# Patient Record
Sex: MALE | Race: WHITE | NOT HISPANIC OR LATINO | Employment: OTHER | URBAN - METROPOLITAN AREA
[De-identification: names, ages, dates, MRNs, and addresses within clinical notes are randomized per-mention and may not be internally consistent; named-entity substitution may affect disease eponyms.]

---

## 2017-02-22 ENCOUNTER — RECORDS - HEALTHEAST (OUTPATIENT)
Dept: LAB | Facility: CLINIC | Age: 67
End: 2017-02-22

## 2017-02-22 LAB
CHOLEST SERPL-MCNC: 135 MG/DL
FASTING STATUS PATIENT QL REPORTED: YES
HDLC SERPL-MCNC: 42 MG/DL
LDLC SERPL CALC-MCNC: 74 MG/DL
TRIGL SERPL-MCNC: 96 MG/DL

## 2017-02-23 LAB — HBA1C MFR BLD: 6.6 % (ref 4.2–6.1)

## 2017-08-28 ENCOUNTER — RECORDS - HEALTHEAST (OUTPATIENT)
Dept: LAB | Facility: CLINIC | Age: 67
End: 2017-08-28

## 2017-08-28 LAB
CHOLEST SERPL-MCNC: 139 MG/DL
FASTING STATUS PATIENT QL REPORTED: NORMAL
HDLC SERPL-MCNC: 44 MG/DL
LDLC SERPL CALC-MCNC: 81 MG/DL
TRIGL SERPL-MCNC: 71 MG/DL

## 2018-04-20 ENCOUNTER — RECORDS - HEALTHEAST (OUTPATIENT)
Dept: LAB | Facility: CLINIC | Age: 68
End: 2018-04-20

## 2018-04-20 ENCOUNTER — RECORDS - HEALTHEAST (OUTPATIENT)
Dept: ADMINISTRATIVE | Facility: OTHER | Age: 68
End: 2018-04-20

## 2018-04-20 LAB
ANION GAP SERPL CALCULATED.3IONS-SCNC: 10 MMOL/L (ref 5–18)
BUN SERPL-MCNC: 17 MG/DL (ref 8–22)
CALCIUM SERPL-MCNC: 8.7 MG/DL (ref 8.5–10.5)
CHLORIDE BLD-SCNC: 103 MMOL/L (ref 98–107)
CHOLEST SERPL-MCNC: 112 MG/DL
CO2 SERPL-SCNC: 26 MMOL/L (ref 22–31)
CREAT SERPL-MCNC: 1.21 MG/DL (ref 0.7–1.3)
FASTING STATUS PATIENT QL REPORTED: NORMAL
GFR SERPL CREATININE-BSD FRML MDRD: 60 ML/MIN/1.73M2
GLUCOSE BLD-MCNC: 133 MG/DL (ref 70–125)
HDLC SERPL-MCNC: 41 MG/DL
LDLC SERPL CALC-MCNC: 59 MG/DL
POTASSIUM BLD-SCNC: 4.4 MMOL/L (ref 3.5–5)
SODIUM SERPL-SCNC: 139 MMOL/L (ref 136–145)
TRIGL SERPL-MCNC: 60 MG/DL

## 2018-07-24 ENCOUNTER — RECORDS - HEALTHEAST (OUTPATIENT)
Dept: ADMINISTRATIVE | Facility: OTHER | Age: 68
End: 2018-07-24

## 2018-07-24 ENCOUNTER — RECORDS - HEALTHEAST (OUTPATIENT)
Dept: LAB | Facility: CLINIC | Age: 68
End: 2018-07-24

## 2018-07-24 LAB
ANION GAP SERPL CALCULATED.3IONS-SCNC: 7 MMOL/L (ref 5–18)
BUN SERPL-MCNC: 16 MG/DL (ref 8–22)
CALCIUM SERPL-MCNC: 9.1 MG/DL (ref 8.5–10.5)
CHLORIDE BLD-SCNC: 106 MMOL/L (ref 98–107)
CO2 SERPL-SCNC: 29 MMOL/L (ref 22–31)
CREAT SERPL-MCNC: 1.05 MG/DL (ref 0.7–1.3)
GFR SERPL CREATININE-BSD FRML MDRD: >60 ML/MIN/1.73M2
GLUCOSE BLD-MCNC: 77 MG/DL (ref 70–125)
POTASSIUM BLD-SCNC: 4.4 MMOL/L (ref 3.5–5)
SODIUM SERPL-SCNC: 142 MMOL/L (ref 136–145)

## 2018-08-13 ENCOUNTER — RECORDS - HEALTHEAST (OUTPATIENT)
Dept: ADMINISTRATIVE | Facility: OTHER | Age: 68
End: 2018-08-13

## 2018-08-15 ENCOUNTER — COMMUNICATION - HEALTHEAST (OUTPATIENT)
Dept: ADMINISTRATIVE | Facility: CLINIC | Age: 68
End: 2018-08-15

## 2018-10-30 ENCOUNTER — RECORDS - HEALTHEAST (OUTPATIENT)
Dept: LAB | Facility: CLINIC | Age: 68
End: 2018-10-30

## 2018-10-30 LAB
ANION GAP SERPL CALCULATED.3IONS-SCNC: 11 MMOL/L (ref 5–18)
BUN SERPL-MCNC: 17 MG/DL (ref 8–22)
CALCIUM SERPL-MCNC: 8.8 MG/DL (ref 8.5–10.5)
CHLORIDE BLD-SCNC: 106 MMOL/L (ref 98–107)
CHOLEST SERPL-MCNC: 116 MG/DL
CO2 SERPL-SCNC: 25 MMOL/L (ref 22–31)
CREAT SERPL-MCNC: 1.03 MG/DL (ref 0.7–1.3)
FASTING STATUS PATIENT QL REPORTED: YES
GFR SERPL CREATININE-BSD FRML MDRD: >60 ML/MIN/1.73M2
GLUCOSE BLD-MCNC: 109 MG/DL (ref 70–125)
HDLC SERPL-MCNC: 45 MG/DL
LDLC SERPL CALC-MCNC: 62 MG/DL
POTASSIUM BLD-SCNC: 4.4 MMOL/L (ref 3.5–5)
PSA SERPL-MCNC: 0.9 NG/ML (ref 0–4.5)
SODIUM SERPL-SCNC: 142 MMOL/L (ref 136–145)
TRIGL SERPL-MCNC: 43 MG/DL

## 2019-04-24 ENCOUNTER — RECORDS - HEALTHEAST (OUTPATIENT)
Dept: ADMINISTRATIVE | Facility: OTHER | Age: 69
End: 2019-04-24

## 2019-05-03 ENCOUNTER — RECORDS - HEALTHEAST (OUTPATIENT)
Dept: LAB | Facility: CLINIC | Age: 69
End: 2019-05-03

## 2019-05-03 ENCOUNTER — RECORDS - HEALTHEAST (OUTPATIENT)
Dept: ADMINISTRATIVE | Facility: OTHER | Age: 69
End: 2019-05-03

## 2019-05-03 LAB
CHOLEST SERPL-MCNC: 123 MG/DL
CREAT UR-MCNC: 147.2 MG/DL
FASTING STATUS PATIENT QL REPORTED: NORMAL
HDLC SERPL-MCNC: 51 MG/DL
LDLC SERPL CALC-MCNC: 62 MG/DL
MICROALBUMIN UR-MCNC: 2.01 MG/DL (ref 0–1.99)
MICROALBUMIN/CREAT UR: 13.7 MG/G
TRIGL SERPL-MCNC: 50 MG/DL

## 2019-05-06 ENCOUNTER — RECORDS - HEALTHEAST (OUTPATIENT)
Dept: ADMINISTRATIVE | Facility: OTHER | Age: 69
End: 2019-05-06

## 2019-05-07 ENCOUNTER — COMMUNICATION - HEALTHEAST (OUTPATIENT)
Dept: ADMINISTRATIVE | Facility: CLINIC | Age: 69
End: 2019-05-07

## 2019-05-30 ENCOUNTER — OFFICE VISIT - HEALTHEAST (OUTPATIENT)
Dept: EDUCATION SERVICES | Facility: CLINIC | Age: 69
End: 2019-05-30

## 2019-05-30 DIAGNOSIS — E10.9 DIABETES MELLITUS TYPE 1 (H): ICD-10-CM

## 2019-11-07 ENCOUNTER — RECORDS - HEALTHEAST (OUTPATIENT)
Dept: LAB | Facility: CLINIC | Age: 69
End: 2019-11-07

## 2019-11-07 LAB
ANION GAP SERPL CALCULATED.3IONS-SCNC: 8 MMOL/L (ref 5–18)
BUN SERPL-MCNC: 20 MG/DL (ref 8–22)
CALCIUM SERPL-MCNC: 8.9 MG/DL (ref 8.5–10.5)
CHLORIDE BLD-SCNC: 105 MMOL/L (ref 98–107)
CHOLEST SERPL-MCNC: 125 MG/DL
CO2 SERPL-SCNC: 27 MMOL/L (ref 22–31)
CREAT SERPL-MCNC: 1.07 MG/DL (ref 0.7–1.3)
FASTING STATUS PATIENT QL REPORTED: NORMAL
GFR SERPL CREATININE-BSD FRML MDRD: >60 ML/MIN/1.73M2
GLUCOSE BLD-MCNC: 160 MG/DL (ref 70–125)
HDLC SERPL-MCNC: 46 MG/DL
LDLC SERPL CALC-MCNC: 68 MG/DL
POTASSIUM BLD-SCNC: 4 MMOL/L (ref 3.5–5)
SODIUM SERPL-SCNC: 140 MMOL/L (ref 136–145)
TRIGL SERPL-MCNC: 54 MG/DL

## 2020-05-12 ENCOUNTER — RECORDS - HEALTHEAST (OUTPATIENT)
Dept: LAB | Facility: CLINIC | Age: 70
End: 2020-05-12

## 2020-05-12 LAB
ANION GAP SERPL CALCULATED.3IONS-SCNC: 9 MMOL/L (ref 5–18)
BUN SERPL-MCNC: 19 MG/DL (ref 8–28)
CALCIUM SERPL-MCNC: 8.8 MG/DL (ref 8.5–10.5)
CHLORIDE BLD-SCNC: 105 MMOL/L (ref 98–107)
CHOLEST SERPL-MCNC: 119 MG/DL
CO2 SERPL-SCNC: 26 MMOL/L (ref 22–31)
CREAT SERPL-MCNC: 1.02 MG/DL (ref 0.7–1.3)
FASTING STATUS PATIENT QL REPORTED: NORMAL
GFR SERPL CREATININE-BSD FRML MDRD: >60 ML/MIN/1.73M2
GLUCOSE BLD-MCNC: 116 MG/DL (ref 70–125)
HDLC SERPL-MCNC: 46 MG/DL
LDLC SERPL CALC-MCNC: 60 MG/DL
POTASSIUM BLD-SCNC: 4.1 MMOL/L (ref 3.5–5)
SODIUM SERPL-SCNC: 140 MMOL/L (ref 136–145)
TRIGL SERPL-MCNC: 63 MG/DL

## 2020-09-03 ENCOUNTER — RECORDS - HEALTHEAST (OUTPATIENT)
Dept: LAB | Facility: CLINIC | Age: 70
End: 2020-09-03

## 2020-09-09 LAB — C PEPTIDE SERPL-MCNC: <0.1 NG/ML (ref 0.9–6.9)

## 2020-12-08 ENCOUNTER — RECORDS - HEALTHEAST (OUTPATIENT)
Dept: LAB | Facility: CLINIC | Age: 70
End: 2020-12-08

## 2020-12-08 LAB
CHOLEST SERPL-MCNC: 119 MG/DL
FASTING STATUS PATIENT QL REPORTED: NORMAL
HDLC SERPL-MCNC: 46 MG/DL
LDLC SERPL CALC-MCNC: 63 MG/DL
TRIGL SERPL-MCNC: 52 MG/DL

## 2021-03-09 ENCOUNTER — RECORDS - HEALTHEAST (OUTPATIENT)
Dept: LAB | Facility: CLINIC | Age: 71
End: 2021-03-09

## 2021-03-09 LAB
ANION GAP SERPL CALCULATED.3IONS-SCNC: 10 MMOL/L (ref 5–18)
BUN SERPL-MCNC: 22 MG/DL (ref 8–28)
CALCIUM SERPL-MCNC: 8.5 MG/DL (ref 8.5–10.5)
CHLORIDE BLD-SCNC: 107 MMOL/L (ref 98–107)
CHOLEST SERPL-MCNC: 116 MG/DL
CO2 SERPL-SCNC: 25 MMOL/L (ref 22–31)
CREAT SERPL-MCNC: 0.98 MG/DL (ref 0.7–1.3)
FASTING STATUS PATIENT QL REPORTED: YES
GFR SERPL CREATININE-BSD FRML MDRD: >60 ML/MIN/1.73M2
GLUCOSE BLD-MCNC: 101 MG/DL (ref 70–125)
HDLC SERPL-MCNC: 50 MG/DL
LDLC SERPL CALC-MCNC: 56 MG/DL
POTASSIUM BLD-SCNC: 4.3 MMOL/L (ref 3.5–5)
SODIUM SERPL-SCNC: 142 MMOL/L (ref 136–145)
TRIGL SERPL-MCNC: 52 MG/DL

## 2021-03-10 LAB — FRUCTOSAMINE SERPL-SCNC: 217 MCMOL/L (ref 200–285)

## 2021-05-28 ENCOUNTER — RECORDS - HEALTHEAST (OUTPATIENT)
Dept: ADMINISTRATIVE | Facility: CLINIC | Age: 71
End: 2021-05-28

## 2021-05-28 NOTE — TELEPHONE ENCOUNTER
[place] [phone]  Referring Provider: Dr. Gaston  DX: Diabetes Type I w/Insulin pump    Ref./rec. Were received in DM consult folder on 05.06.2019 @ 11:41 & 11:43

## 2021-05-29 NOTE — PROGRESS NOTES
Assessment: pt seen today for DM education. He was diagnosed with DM at 10 years old. Currently wearing a Medtronic 500 series pump. No sensor as medicare does not cover. Pt brings in a detailed log of all of his DM information for the past week. He is checking BG at home a few times per day.   FB, 149, 141, 115, 79, 112, 206  9-10am: 84, 112, 63, 99, 155  11am: 108, 121  1:30pm: 169, 206, 154  3-4pm: 201, 288, 235  6-7pm: 167, 119, 283, 210, 306  8-9pm: 286, 237, 282  10pm-M: 181, 241, 219, 202, 109, 150, 149, 226, 69  Pt states 1 year ago he was 100# heavier and using U500 insulin. He states he heard about intermittent fasting and read up on it. He started doing that and attributes that to his wt loss and to needing less insulin. In his DM log, it shows he is eating 2 meals/day, he will usually have breakfast around 10am and dinner around 5-6pm. He only allows himself to eat between these 8 hours, the rest of the time he is fasting. Unless, of course he needs to eat if he has low BG. Discussed some concern over low BG, 2 readings noted <70 in the past week. Pt states he is very in tune with his body and that lower readings are easily treated with only a small handful of raisins. Pt states his low BG are never severe and no not last long. He is always carrying treatment.   Reviewed pump settings:   Basal rates:   12am: 2.1 units/hr   10am: 4.0 units/hr   12pm: 2.0 units/hr    4pm: 4.0 units/hr   7pm: 2.2 units/hr   Total 24 hour basal dose: 60 units   Pt does not use a bolus wizard or ICR. He states he boluses based on his knowledge of his body and how it responds. He will bolus when he eats and he will correct typically if he is >150. For example, one morning he woke up at 162, corrected with 2 units and his BG was 84, 3 hours later. Discussed possibly using an ICR, ISF or the bolus wizard. Pt explains that he doesn't feel that would make anything easier for him as he has been doing this for so long.      Plan: no changes made today overall pt doing well, no concerns except potential for lows which was covered today. Continue to monitor BG. If low BG start to become a problem then call for another appt to review changes that might need to be made. Otherwise ok to f/u as needed per pt.     Subjective and Objective:      Cam Hernandez is referred by Dr. Marilin Garcia for Diabetes Education.     Most recent A1c at 5.9%    Lab Results   Component Value Date    HGBA1C 6.6 (H) 02/22/2017       Follow up:   PRN       Education:     Monitoring   Meter (per above goals): Discussed  Monitoring: Assessed and Discussed  BG goals: Discussed    Nutrition Management  Nutrition Management: Assessed and Discussed  Weight: Discussed  Portions/Balance: Assessed and Discussed  Carb ID/Count: Assessed and Discussed  Label Reading: Assessed and Discussed  Heart Healthy Fats: Assessed and Discussed  Menu Planning: Assessed and Discussed  Dining Out: Assessed and Discussed  Physical Activity: Assessed and Discussed  Medications: Discussed  Orals: Discussed  Injected Medications: Discussed   Storage/Exp:Competent   Site Rotation: Discussed   Sites Assessed: no    Diabetes Disease Process: Discussed    Acute Complications: Prevent, Detect, Treat:  Hypoglycemia: Assessed and Discussed  Hyperglycemia: Assessed and Discussed  Sick Days: Not addressed  Driving: Discussed    Chronic Complications  Foot Care:Discussed  Skin Care: Not addressed  Eye: Discussed  ABC: Discussed  Teeth:Not addressed  Goal Setting and Problem Solving: Assessed and Discussed  Barriers: Assessed and Discussed  Psychosocial Adjustments: Assessed and Discussed      Time spent with the patient: 60 minutes for diabetes education and counseling.   Previous Education: yes  Visit Type:FRANDY Briscoe  5/30/2019

## 2021-05-31 ENCOUNTER — RECORDS - HEALTHEAST (OUTPATIENT)
Dept: ADMINISTRATIVE | Facility: CLINIC | Age: 71
End: 2021-05-31

## 2021-06-01 ENCOUNTER — RECORDS - HEALTHEAST (OUTPATIENT)
Dept: ADMINISTRATIVE | Facility: CLINIC | Age: 71
End: 2021-06-01

## 2021-09-22 ENCOUNTER — LAB REQUISITION (OUTPATIENT)
Dept: LAB | Facility: CLINIC | Age: 71
End: 2021-09-22
Payer: MEDICARE

## 2021-09-22 ENCOUNTER — TRANSFERRED RECORDS (OUTPATIENT)
Dept: HEALTH INFORMATION MANAGEMENT | Facility: CLINIC | Age: 71
End: 2021-09-22

## 2021-09-22 DIAGNOSIS — I87.323 CHRONIC VENOUS HYPERTENSION (IDIOPATHIC) WITH INFLAMMATION OF BILATERAL LOWER EXTREMITY: ICD-10-CM

## 2021-09-22 DIAGNOSIS — E78.2 MIXED HYPERLIPIDEMIA: ICD-10-CM

## 2021-09-22 DIAGNOSIS — E10.319 TYPE 1 DIABETES MELLITUS WITH UNSPECIFIED DIABETIC RETINOPATHY WITHOUT MACULAR EDEMA (H): ICD-10-CM

## 2021-09-22 DIAGNOSIS — Z00.00 ENCOUNTER FOR GENERAL ADULT MEDICAL EXAMINATION WITHOUT ABNORMAL FINDINGS: ICD-10-CM

## 2021-09-22 LAB
ANION GAP SERPL CALCULATED.3IONS-SCNC: 10 MMOL/L (ref 5–18)
BUN SERPL-MCNC: 21 MG/DL (ref 8–28)
CALCIUM SERPL-MCNC: 9.3 MG/DL (ref 8.5–10.5)
CHLORIDE BLD-SCNC: 107 MMOL/L (ref 98–107)
CHOLEST SERPL-MCNC: 117 MG/DL
CO2 SERPL-SCNC: 26 MMOL/L (ref 22–31)
CREAT SERPL-MCNC: 1.06 MG/DL (ref 0.7–1.3)
CREAT UR-MCNC: 63 MG/DL
GFR SERPL CREATININE-BSD FRML MDRD: 70 ML/MIN/1.73M2
GLUCOSE BLD-MCNC: 89 MG/DL (ref 70–125)
HBA1C MFR BLD: 5.9 % (ref 4.2–6.1)
HDLC SERPL-MCNC: 47 MG/DL
HGB BLD-MCNC: 14.6 G/DL (ref 13.3–17.7)
LDLC SERPL CALC-MCNC: 61 MG/DL
MICROALBUMIN UR-MCNC: 1.73 MG/DL (ref 0–1.99)
MICROALBUMIN/CREAT UR: 27.5 MG/G CR
POTASSIUM BLD-SCNC: 4.3 MMOL/L (ref 3.5–5)
PSA SERPL-MCNC: 1.02 UG/L (ref 0–6.5)
SODIUM SERPL-SCNC: 143 MMOL/L (ref 136–145)
TRIGL SERPL-MCNC: 47 MG/DL

## 2021-09-22 PROCEDURE — 85018 HEMOGLOBIN: CPT | Mod: ORL | Performed by: FAMILY MEDICINE

## 2021-09-22 PROCEDURE — G0103 PSA SCREENING: HCPCS | Mod: ORL | Performed by: FAMILY MEDICINE

## 2021-09-22 PROCEDURE — 82043 UR ALBUMIN QUANTITATIVE: CPT | Mod: ORL | Performed by: FAMILY MEDICINE

## 2021-09-22 PROCEDURE — 80061 LIPID PANEL: CPT | Mod: ORL | Performed by: FAMILY MEDICINE

## 2021-09-22 PROCEDURE — 80048 BASIC METABOLIC PNL TOTAL CA: CPT | Mod: ORL | Performed by: FAMILY MEDICINE

## 2021-09-28 ENCOUNTER — OFFICE VISIT (OUTPATIENT)
Dept: PHARMACY | Facility: PHYSICIAN GROUP | Age: 71
End: 2021-09-28
Payer: COMMERCIAL

## 2021-09-28 DIAGNOSIS — E10.9 TYPE 1 DIABETES MELLITUS WITHOUT COMPLICATION (H): Primary | ICD-10-CM

## 2021-09-28 PROCEDURE — 99607 MTMS BY PHARM ADDL 15 MIN: CPT | Performed by: PHARMACIST

## 2021-09-28 PROCEDURE — 99605 MTMS BY PHARM NP 15 MIN: CPT | Performed by: PHARMACIST

## 2021-09-28 NOTE — PROGRESS NOTES
Medication Therapy Management (MTM) Encounter    ASSESSMENT:                            Medication Adherence/Access: No issues identified    Type 1 diabetes: Patient is meeting A1c goal of < 7%. He would benefit from continuous glucose monitor to help with managing his insulin pump. Given supplies he has for current pump it is reasonable to order Dexcom CGM but this will still require he self-adjust pump. He may benefit from connecting CGM to newer pump in the future when he is ready to switch and current supplies are low. He will need to come back for Dexcom education once he receives it.      PLAN:                            1. Dexcom G6 orders sent to [facility]. If not able to bill will send orders to [facility]. You should receive call from pharmacy with update in the next week.    Follow-up: Return in about 2 weeks (around 10/12/2021) for diabetes medication management, in person when receive Dexcom supplies.      SUBJECTIVE/OBJECTIVE:                          Cam Hernandez is a 71 year old male coming in for an initial visit. He was referred to me from Soto Gaston MD.      Reason for visit: Diabetes medication management- interested in starting continuous glucose monitor.    Allergies/ADRs: Reviewed in chart  Past Medical History: Reviewed in chart  Tobacco: He reports that he quit smoking about 8 years ago. His smoking use included cigars. He has never used smokeless tobacco.  Alcohol: not currently using    Medication Adherence/Access: no issues reported    Type 1 Diabetes:   Diabetes Medication(s)     Insulin       insulin aspart (NOVOLOG VIAL) 100 UNITS/ML vial    INJECT 60 UNITS UNDER THE SKIN AS DIRECTED. BOLUS UP TO 30 UNITS DAILY.  UNITS DAILY. USE WITH INSULIN PUMP        Patient has been managing his type 1 diabetes for 60 years and has had good control with insulin pump at current regimen. He has a [employer] pump but is not using a continuous glucose monitor. He has  used a continuous glucose monitor in the past but frequent calibrations required limited use since he needed to test with glucometer so much anyway. He is interested in using one of the newer continuous monitors. He currently has pump supplies ordered by Dr. Gaston but adjustments are made through endocrinology. He has a lot of supplies for current pump so he isn't sure about switching pumps yet.   Blood sugar monitorin time(s) daily.   Diet/Exercise: He counts carbs and tries to eat high protein, low carb, he enjoys cooking and finding alternative recipes that are better for his diabetes  Symptoms of low blood sugar? dizzy, weak, Frequency of lows- occasional  Symptoms of high blood sugar? none  Eye exam: up to date; Foot exam: up to date  Aspirin: Taking 81mg daily and denies side effects  Statin: Yes: simvastatin 40 mg    ACEi/ARB: No.     Last A1c: 2021- 5.9%      Today's Vitals: /76 (BP Location: Right arm, Patient Position: Sitting, Cuff Size: Adult Large)   Pulse 73   ----------------    I spent 60 minutes with this patient today. All changes were made via collaborative practice agreement with Soto Gaston MD. A copy of the visit note was provided to the patient's primary care provider.    The patient was given a summary of these recommendations.     Jennifer Bernstein, PharmD, BCACP  Medication Therapy Management Pharmacist  [facility]  Pager: [phone]       Medication Therapy Recommendations  Type 1 diabetes mellitus without complication (H)    Current Medication: insulin aspart (NOVOLOG VIAL) 100 UNITS/ML vial   Rationale: Medication requires monitoring - Needs additional monitoring   Recommendation: Self-Monitoring - Dexcom G6 Sensor Misc - Dexcom G6 orders sent to [facility].   Status: Accepted per CPA

## 2021-10-01 VITALS — SYSTOLIC BLOOD PRESSURE: 133 MMHG | DIASTOLIC BLOOD PRESSURE: 76 MMHG | HEART RATE: 73 BPM

## 2021-10-01 RX ORDER — POLYETHYLENE GLYCOL 3350 17 G/17G
1 POWDER, FOR SOLUTION ORAL DAILY PRN
COMMUNITY

## 2021-10-01 RX ORDER — SIMVASTATIN 40 MG
40 TABLET ORAL EVERY EVENING
COMMUNITY
End: 2024-09-14

## 2021-10-01 RX ORDER — QUINIDINE SULFATE 200 MG
1 TABLET ORAL DAILY
COMMUNITY
End: 2024-09-14

## 2021-10-01 NOTE — PATIENT INSTRUCTIONS
Recommendations from today's MTM visit:                                                    MTM (medication therapy management) is a service provided by a clinical pharmacist designed to help you get the most of out of your medicines.   Today we reviewed what your medicines are for, how to know if they are working, that your medicines are safe and how to make your medicine regimen as easy as possible.      1. Dexcom G6 orders sent to [facility]. You should receive call from pharmacy with update in the next week.    Follow-up: Return in about 2 weeks (around 10/12/2021) for diabetes medication management, in person when receive Dexcom supplies.    It was great to speak with you today.  I value your experience and would be very thankful for your time with providing feedback on our clinic survey. You may receive a survey via email or text message in the next few days.     To schedule another MTM appointment, please call the clinic directly or you may call the MTM scheduling line at [phone] or toll-free at [phone].     My Clinical Pharmacist's contact information:                                                      Please feel free to contact me with any questions or concerns you have.      Jennifer Bernstein, PharmD, BCACP  Medication Therapy Management Pharmacist  [facility]  Pager: [phone]

## 2021-10-12 ENCOUNTER — LAB REQUISITION (OUTPATIENT)
Dept: LAB | Facility: CLINIC | Age: 71
End: 2021-10-12
Payer: MEDICARE

## 2021-10-12 DIAGNOSIS — E10.319 TYPE 1 DIABETES MELLITUS WITH UNSPECIFIED DIABETIC RETINOPATHY WITHOUT MACULAR EDEMA (H): ICD-10-CM

## 2021-10-12 LAB — GLUCOSE BLD-MCNC: 113 MG/DL (ref 70–125)

## 2021-10-12 PROCEDURE — 82947 ASSAY GLUCOSE BLOOD QUANT: CPT | Mod: ORL | Performed by: FAMILY MEDICINE

## 2021-10-12 PROCEDURE — 84681 ASSAY OF C-PEPTIDE: CPT | Mod: ORL | Performed by: FAMILY MEDICINE

## 2021-10-13 LAB — C PEPTIDE SERPL-MCNC: <0.1 NG/ML (ref 0.9–6.9)

## 2021-12-07 ENCOUNTER — VIRTUAL VISIT (OUTPATIENT)
Dept: PHARMACY | Facility: PHYSICIAN GROUP | Age: 71
End: 2021-12-07
Payer: COMMERCIAL

## 2021-12-07 DIAGNOSIS — E10.9 TYPE 1 DIABETES MELLITUS WITHOUT COMPLICATION (H): Primary | ICD-10-CM

## 2021-12-07 PROCEDURE — 99607 MTMS BY PHARM ADDL 15 MIN: CPT | Performed by: PHARMACIST

## 2021-12-07 PROCEDURE — 99606 MTMS BY PHARM EST 15 MIN: CPT | Performed by: PHARMACIST

## 2021-12-07 NOTE — PROGRESS NOTES
Medication Therapy Management (MTM) Encounter    ASSESSMENT:                            Medication Adherence/Access: No issues identified    Type 1 diabetes: Patient is meeting A1c goal of < 7%. Self monitoring of blood glucose is at goal of fasting  mg/dL and post prandial < 180 mg/dL. He would benefit from continuing to use Dexcom CGM to help with insulin dose adjustment with pump. Need to renew Medicare CGM requirements with [employer] every 6 months.      PLAN:                            1. Continue using Dexcom G6 CGM to monitor blood sugar    Follow-up: Return in about 6 months (around 6/7/2022) for diabetes medication management, with me, using a phone visit.      SUBJECTIVE/OBJECTIVE:                          Cam Hernandez is a 71 year old male called for a follow-up visit. He was referred to me from Dr. Gaston.  Today's visit is a follow-up MTM visit from 9/28/2021.     Reason for visit: Diabetes medication management follow-up.    Allergies/ADRs: Reviewed in chart  Past Medical History: Reviewed in chart  Tobacco: He reports that he quit smoking about 8 years ago. His smoking use included cigars. He has never used smokeless tobacco.  Alcohol: not currently using    Medication Adherence/Access: no issues reported    Type 1 Diabetes:   Diabetes Medication(s)     Insulin       insulin aspart (NOVOLOG VIAL) 100 UNITS/ML vial    INJECT 60 UNITS UNDER THE SKIN AS DIRECTED. BOLUS UP TO 30 UNITS DAILY.  UNITS DAILY. USE WITH INSULIN PUMP        Patient has been managing his type 1 diabetes for 60 years and has had good control with insulin pump at current regimen. After our last visit he was able to start using Dexcom G6 CGM to start checking his blood sugars and it has been going really well. He has been able to reduce his insulin use, basal dose/day has reduced from 48 units to 40 units. He did have some trouble with alerts overnight for lows when he first started using it but he has adjusted his  basal rate overnight and this has resolved. He is still working with [employer] on figuring out if he will be able to get a new pump, he has adequate supplies for current pump right now.  Blood sugar monitoring: Dexcom G6  Diet/Exercise: He counts carbs and tries to eat high protein, low carb, he enjoys cooking and finding alternative recipes that are better for his diabetes, intermittent fasting  Symptoms of low blood sugar? dizzy, weak, Frequency of lows- occasional, not as much since using Dexcom   Symptoms of high blood sugar? none  Eye exam: up to date; Foot exam: up to date  Aspirin: Taking 81mg daily and denies side effects  Statin: Yes: simvastatin 40 mg    ACEi/ARB: No.     Abstracted A1c result-  Lab Results   Component Value Date    09486 5.9 09/22/2021       Today's Vitals: There were no vitals taken for this visit.  ----------------      I spent 30 minutes with this patient today. All changes were made via collaborative practice agreement with Soto Gaston MD. A copy of the visit note was provided to the patient's primary care provider.    The patient declined a summary of these recommendations.     Jennifer Ortiz, PharmD, BCACP  Medication Therapy Management Pharmacist  [facility]  Pager: [phone]      Telemedicine Visit Details  Type of service:  Telephone visit  Start Time: 8:47 AM  End Time: 9:17 AM  Originating Location (patient location): [place]  Distant Location (provider location):  [facility]     Medication Therapy Recommendations  No medication therapy recommendations to display

## 2021-12-23 ENCOUNTER — TRANSFERRED RECORDS (OUTPATIENT)
Dept: HEALTH INFORMATION MANAGEMENT | Facility: CLINIC | Age: 71
End: 2021-12-23
Payer: MEDICARE

## 2021-12-23 LAB — HBA1C MFR BLD: 6.2 % (ref 4.2–6.1)

## 2022-03-24 ENCOUNTER — LAB REQUISITION (OUTPATIENT)
Dept: LAB | Facility: CLINIC | Age: 72
End: 2022-03-24
Payer: MEDICARE

## 2022-03-24 DIAGNOSIS — E78.2 MIXED HYPERLIPIDEMIA: ICD-10-CM

## 2022-03-24 LAB
CHOLEST SERPL-MCNC: 138 MG/DL
HDLC SERPL-MCNC: 50 MG/DL
LDLC SERPL CALC-MCNC: 74 MG/DL
TRIGL SERPL-MCNC: 72 MG/DL

## 2022-03-24 PROCEDURE — 80061 LIPID PANEL: CPT | Mod: ORL | Performed by: FAMILY MEDICINE

## 2022-09-30 ENCOUNTER — LAB REQUISITION (OUTPATIENT)
Dept: LAB | Facility: CLINIC | Age: 72
End: 2022-09-30
Payer: MEDICARE

## 2022-09-30 ENCOUNTER — TRANSFERRED RECORDS (OUTPATIENT)
Dept: HEALTH INFORMATION MANAGEMENT | Facility: CLINIC | Age: 72
End: 2022-09-30

## 2022-09-30 DIAGNOSIS — E10.42 TYPE 1 DIABETES MELLITUS WITH DIABETIC POLYNEUROPATHY (H): ICD-10-CM

## 2022-09-30 DIAGNOSIS — E78.2 MIXED HYPERLIPIDEMIA: ICD-10-CM

## 2022-09-30 LAB
CHOLEST SERPL-MCNC: 118 MG/DL
CREAT UR-MCNC: 81.1 MG/DL
HBA1C MFR BLD: 5.7 % (ref 4.2–6.1)
HDLC SERPL-MCNC: 54 MG/DL
LDLC SERPL CALC-MCNC: 55 MG/DL
MICROALBUMIN UR-MCNC: <12 MG/L
MICROALBUMIN/CREAT UR: NORMAL MG/G{CREAT}
NONHDLC SERPL-MCNC: 64 MG/DL
TRIGL SERPL-MCNC: 45 MG/DL

## 2022-09-30 PROCEDURE — 80061 LIPID PANEL: CPT | Mod: ORL | Performed by: FAMILY MEDICINE

## 2022-09-30 PROCEDURE — 82043 UR ALBUMIN QUANTITATIVE: CPT | Mod: ORL | Performed by: FAMILY MEDICINE

## 2022-11-23 ENCOUNTER — TELEPHONE (OUTPATIENT)
Dept: PHARMACY | Facility: PHYSICIAN GROUP | Age: 72
End: 2022-11-23

## 2022-11-23 NOTE — TELEPHONE ENCOUNTER
Blood pressure updated for quality measures.    Jennifer Ortiz, PharmD, BCACP  Medication Therapy Management Pharmacist  [facility]  Pager: [phone]

## 2023-01-05 ENCOUNTER — LAB REQUISITION (OUTPATIENT)
Dept: LAB | Facility: CLINIC | Age: 73
End: 2023-01-05
Payer: COMMERCIAL

## 2023-01-05 DIAGNOSIS — E10.42 TYPE 1 DIABETES MELLITUS WITH DIABETIC POLYNEUROPATHY (H): ICD-10-CM

## 2023-01-05 LAB — HBA1C MFR BLD: 5.8 %

## 2023-01-05 PROCEDURE — 83036 HEMOGLOBIN GLYCOSYLATED A1C: CPT | Mod: ORL | Performed by: FAMILY MEDICINE

## 2023-04-13 ENCOUNTER — LAB REQUISITION (OUTPATIENT)
Dept: LAB | Facility: CLINIC | Age: 73
End: 2023-04-13
Payer: COMMERCIAL

## 2023-04-13 DIAGNOSIS — E78.2 MIXED HYPERLIPIDEMIA: ICD-10-CM

## 2023-04-13 PROCEDURE — 80061 LIPID PANEL: CPT | Mod: ORL | Performed by: FAMILY MEDICINE

## 2023-04-14 LAB
CHOLEST SERPL-MCNC: 131 MG/DL
HDLC SERPL-MCNC: 55 MG/DL
LDLC SERPL CALC-MCNC: 63 MG/DL
NONHDLC SERPL-MCNC: 76 MG/DL
TRIGL SERPL-MCNC: 66 MG/DL

## 2023-10-24 ENCOUNTER — LAB REQUISITION (OUTPATIENT)
Dept: LAB | Facility: CLINIC | Age: 73
End: 2023-10-24
Payer: COMMERCIAL

## 2023-10-24 DIAGNOSIS — E78.2 MIXED HYPERLIPIDEMIA: ICD-10-CM

## 2023-10-24 PROCEDURE — 80061 LIPID PANEL: CPT | Mod: ORL | Performed by: FAMILY MEDICINE

## 2023-10-25 LAB
CHOLEST SERPL-MCNC: 161 MG/DL
HDLC SERPL-MCNC: 54 MG/DL
LDLC SERPL CALC-MCNC: 97 MG/DL
NONHDLC SERPL-MCNC: 107 MG/DL
TRIGL SERPL-MCNC: 48 MG/DL

## 2024-01-26 ENCOUNTER — LAB REQUISITION (OUTPATIENT)
Dept: LAB | Facility: CLINIC | Age: 74
End: 2024-01-26
Payer: COMMERCIAL

## 2024-01-26 DIAGNOSIS — E10.42 TYPE 1 DIABETES MELLITUS WITH DIABETIC POLYNEUROPATHY (H): ICD-10-CM

## 2024-01-26 PROCEDURE — 82043 UR ALBUMIN QUANTITATIVE: CPT | Mod: ORL | Performed by: FAMILY MEDICINE

## 2024-01-27 LAB
CREAT UR-MCNC: 72.8 MG/DL
MICROALBUMIN UR-MCNC: <12 MG/L
MICROALBUMIN/CREAT UR: NORMAL MG/G{CREAT}

## 2024-05-03 ENCOUNTER — LAB REQUISITION (OUTPATIENT)
Dept: LAB | Facility: CLINIC | Age: 74
End: 2024-05-03
Payer: COMMERCIAL

## 2024-05-03 DIAGNOSIS — E78.2 MIXED HYPERLIPIDEMIA: ICD-10-CM

## 2024-05-03 PROCEDURE — 80061 LIPID PANEL: CPT | Mod: ORL | Performed by: FAMILY MEDICINE

## 2024-05-03 PROCEDURE — 80053 COMPREHEN METABOLIC PANEL: CPT | Mod: ORL | Performed by: FAMILY MEDICINE

## 2024-05-04 LAB
ALBUMIN SERPL BCG-MCNC: 3.8 G/DL (ref 3.5–5.2)
ALP SERPL-CCNC: 109 U/L (ref 40–150)
ALT SERPL W P-5'-P-CCNC: 10 U/L (ref 0–70)
ANION GAP SERPL CALCULATED.3IONS-SCNC: 10 MMOL/L (ref 7–15)
AST SERPL W P-5'-P-CCNC: 18 U/L (ref 0–45)
BILIRUB SERPL-MCNC: 0.5 MG/DL
BUN SERPL-MCNC: 19.1 MG/DL (ref 8–23)
CALCIUM SERPL-MCNC: 9.1 MG/DL (ref 8.8–10.2)
CHLORIDE SERPL-SCNC: 106 MMOL/L (ref 98–107)
CHOLEST SERPL-MCNC: 169 MG/DL
CREAT SERPL-MCNC: 1.22 MG/DL (ref 0.67–1.17)
DEPRECATED HCO3 PLAS-SCNC: 25 MMOL/L (ref 22–29)
EGFRCR SERPLBLD CKD-EPI 2021: 62 ML/MIN/1.73M2
FASTING STATUS PATIENT QL REPORTED: ABNORMAL
GLUCOSE SERPL-MCNC: 111 MG/DL (ref 70–99)
HDLC SERPL-MCNC: 55 MG/DL
LDLC SERPL CALC-MCNC: 103 MG/DL
NONHDLC SERPL-MCNC: 114 MG/DL
POTASSIUM SERPL-SCNC: 4.4 MMOL/L (ref 3.4–5.3)
PROT SERPL-MCNC: 6.3 G/DL (ref 6.4–8.3)
SODIUM SERPL-SCNC: 141 MMOL/L (ref 135–145)
TRIGL SERPL-MCNC: 53 MG/DL

## 2024-06-12 ENCOUNTER — LAB REQUISITION (OUTPATIENT)
Dept: LAB | Facility: CLINIC | Age: 74
End: 2024-06-12
Payer: COMMERCIAL

## 2024-06-12 DIAGNOSIS — E78.2 MIXED HYPERLIPIDEMIA: ICD-10-CM

## 2024-06-12 PROCEDURE — 80061 LIPID PANEL: CPT | Mod: ORL | Performed by: FAMILY MEDICINE

## 2024-06-12 PROCEDURE — 84450 TRANSFERASE (AST) (SGOT): CPT | Mod: ORL | Performed by: FAMILY MEDICINE

## 2024-06-13 LAB
AST SERPL W P-5'-P-CCNC: 18 U/L (ref 0–45)
CHOLEST SERPL-MCNC: 130 MG/DL
FASTING STATUS PATIENT QL REPORTED: NORMAL
HDLC SERPL-MCNC: 55 MG/DL
LDLC SERPL CALC-MCNC: 62 MG/DL
NONHDLC SERPL-MCNC: 75 MG/DL
TRIGL SERPL-MCNC: 63 MG/DL

## 2024-09-14 ENCOUNTER — ANESTHESIA EVENT (OUTPATIENT)
Dept: SURGERY | Facility: HOSPITAL | Age: 74
DRG: 481 | End: 2024-09-14
Payer: COMMERCIAL

## 2024-09-14 ENCOUNTER — APPOINTMENT (OUTPATIENT)
Dept: RADIOLOGY | Facility: HOSPITAL | Age: 74
DRG: 481 | End: 2024-09-14
Attending: STUDENT IN AN ORGANIZED HEALTH CARE EDUCATION/TRAINING PROGRAM
Payer: COMMERCIAL

## 2024-09-14 ENCOUNTER — APPOINTMENT (OUTPATIENT)
Dept: RADIOLOGY | Facility: HOSPITAL | Age: 74
DRG: 481 | End: 2024-09-14
Attending: PHYSICIAN ASSISTANT
Payer: COMMERCIAL

## 2024-09-14 ENCOUNTER — APPOINTMENT (OUTPATIENT)
Dept: CARDIOLOGY | Facility: HOSPITAL | Age: 74
DRG: 481 | End: 2024-09-14
Attending: INTERNAL MEDICINE
Payer: COMMERCIAL

## 2024-09-14 ENCOUNTER — HOSPITAL ENCOUNTER (INPATIENT)
Facility: HOSPITAL | Age: 74
LOS: 3 days | Discharge: NURSING, REHAB OR LONG-TERM CARE | DRG: 481 | End: 2024-09-17
Attending: EMERGENCY MEDICINE | Admitting: INTERNAL MEDICINE
Payer: COMMERCIAL

## 2024-09-14 ENCOUNTER — ANESTHESIA (OUTPATIENT)
Dept: SURGERY | Facility: HOSPITAL | Age: 74
DRG: 481 | End: 2024-09-14
Payer: COMMERCIAL
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
## 2024-09-14 DIAGNOSIS — M25.551 RIGHT HIP PAIN: ICD-10-CM

## 2024-09-14 DIAGNOSIS — S72.001A HIP FRACTURE, RIGHT, CLOSED, INITIAL ENCOUNTER (H): Primary | ICD-10-CM

## 2024-09-14 LAB
ANION GAP SERPL CALCULATED.3IONS-SCNC: 9 MMOL/L (ref 7–15)
BUN SERPL-MCNC: 22.6 MG/DL (ref 8–23)
CALCIUM SERPL-MCNC: 8.7 MG/DL (ref 8.8–10.4)
CHLORIDE SERPL-SCNC: 106 MMOL/L (ref 98–107)
CREAT SERPL-MCNC: 1.17 MG/DL (ref 0.67–1.17)
EGFRCR SERPLBLD CKD-EPI 2021: 65 ML/MIN/1.73M2
ERYTHROCYTE [DISTWIDTH] IN BLOOD BY AUTOMATED COUNT: 13.3 % (ref 10–15)
GLUCOSE BLDC GLUCOMTR-MCNC: 178 MG/DL (ref 70–99)
GLUCOSE SERPL-MCNC: 142 MG/DL (ref 70–99)
HBA1C MFR BLD: 6.1 %
HCO3 SERPL-SCNC: 27 MMOL/L (ref 22–29)
HCT VFR BLD AUTO: 44.4 % (ref 40–53)
HGB BLD-MCNC: 14.6 G/DL (ref 13.3–17.7)
LVEF ECHO: NORMAL
MCH RBC QN AUTO: 29.9 PG (ref 26.5–33)
MCHC RBC AUTO-ENTMCNC: 32.9 G/DL (ref 31.5–36.5)
MCV RBC AUTO: 91 FL (ref 78–100)
NT-PROBNP SERPL-MCNC: 241 PG/ML (ref 0–900)
PLATELET # BLD AUTO: 300 10E3/UL (ref 150–450)
POTASSIUM SERPL-SCNC: 4.2 MMOL/L (ref 3.4–5.3)
RBC # BLD AUTO: 4.88 10E6/UL (ref 4.4–5.9)
SODIUM SERPL-SCNC: 142 MMOL/L (ref 135–145)
WBC # BLD AUTO: 14.7 10E3/UL (ref 4–11)

## 2024-09-14 PROCEDURE — 710N000009 HC RECOVERY PHASE 1, LEVEL 1, PER MIN: Performed by: STUDENT IN AN ORGANIZED HEALTH CARE EDUCATION/TRAINING PROGRAM

## 2024-09-14 PROCEDURE — 250N000025 HC SEVOFLURANE, PER MIN: Performed by: STUDENT IN AN ORGANIZED HEALTH CARE EDUCATION/TRAINING PROGRAM

## 2024-09-14 PROCEDURE — 250N000009 HC RX 250: Performed by: ANESTHESIOLOGY

## 2024-09-14 PROCEDURE — 83880 ASSAY OF NATRIURETIC PEPTIDE: CPT | Performed by: INTERNAL MEDICINE

## 2024-09-14 PROCEDURE — 93005 ELECTROCARDIOGRAM TRACING: CPT | Performed by: PHYSICIAN ASSISTANT

## 2024-09-14 PROCEDURE — 120N000001 HC R&B MED SURG/OB

## 2024-09-14 PROCEDURE — 272N000001 HC OR GENERAL SUPPLY STERILE: Performed by: STUDENT IN AN ORGANIZED HEALTH CARE EDUCATION/TRAINING PROGRAM

## 2024-09-14 PROCEDURE — 99100 ANES PT EXTEME AGE<1 YR&>70: CPT | Performed by: NURSE ANESTHETIST, CERTIFIED REGISTERED

## 2024-09-14 PROCEDURE — 73502 X-RAY EXAM HIP UNI 2-3 VIEWS: CPT

## 2024-09-14 PROCEDURE — 999N000065 XR FEMUR PORT RIGHT 2 VIEWS: Mod: RT

## 2024-09-14 PROCEDURE — 250N000011 HC RX IP 250 OP 636: Performed by: STUDENT IN AN ORGANIZED HEALTH CARE EDUCATION/TRAINING PROGRAM

## 2024-09-14 PROCEDURE — 99222 1ST HOSP IP/OBS MODERATE 55: CPT | Performed by: INTERNAL MEDICINE

## 2024-09-14 PROCEDURE — 250N000011 HC RX IP 250 OP 636: Performed by: ANESTHESIOLOGY

## 2024-09-14 PROCEDURE — 85027 COMPLETE CBC AUTOMATED: CPT | Performed by: PHYSICIAN ASSISTANT

## 2024-09-14 PROCEDURE — 370N000017 HC ANESTHESIA TECHNICAL FEE, PER MIN: Performed by: STUDENT IN AN ORGANIZED HEALTH CARE EDUCATION/TRAINING PROGRAM

## 2024-09-14 PROCEDURE — 999N000180 XR SURGERY CARM FLUORO LESS THAN 5 MIN: Mod: TC

## 2024-09-14 PROCEDURE — 255N000002 HC RX 255 OP 636: Performed by: INTERNAL MEDICINE

## 2024-09-14 PROCEDURE — 80048 BASIC METABOLIC PNL TOTAL CA: CPT | Performed by: PHYSICIAN ASSISTANT

## 2024-09-14 PROCEDURE — 258N000003 HC RX IP 258 OP 636: Performed by: ANESTHESIOLOGY

## 2024-09-14 PROCEDURE — 51798 US URINE CAPACITY MEASURE: CPT

## 2024-09-14 PROCEDURE — 250N000009 HC RX 250: Performed by: STUDENT IN AN ORGANIZED HEALTH CARE EDUCATION/TRAINING PROGRAM

## 2024-09-14 PROCEDURE — 93306 TTE W/DOPPLER COMPLETE: CPT | Mod: 26 | Performed by: INTERNAL MEDICINE

## 2024-09-14 PROCEDURE — 96374 THER/PROPH/DIAG INJ IV PUSH: CPT

## 2024-09-14 PROCEDURE — 36415 COLL VENOUS BLD VENIPUNCTURE: CPT | Performed by: PHYSICIAN ASSISTANT

## 2024-09-14 PROCEDURE — C8929 TTE W OR WO FOL WCON,DOPPLER: HCPCS

## 2024-09-14 PROCEDURE — 360N000084 HC SURGERY LEVEL 4 W/ FLUORO, PER MIN: Performed by: STUDENT IN AN ORGANIZED HEALTH CARE EDUCATION/TRAINING PROGRAM

## 2024-09-14 PROCEDURE — 96375 TX/PRO/DX INJ NEW DRUG ADDON: CPT

## 2024-09-14 PROCEDURE — 99285 EMERGENCY DEPT VISIT HI MDM: CPT | Mod: 25

## 2024-09-14 PROCEDURE — 27244 TREAT THIGH FRACTURE: CPT | Performed by: NURSE ANESTHETIST, CERTIFIED REGISTERED

## 2024-09-14 PROCEDURE — C1713 ANCHOR/SCREW BN/BN,TIS/BN: HCPCS | Performed by: STUDENT IN AN ORGANIZED HEALTH CARE EDUCATION/TRAINING PROGRAM

## 2024-09-14 PROCEDURE — 999N000141 HC STATISTIC PRE-PROCEDURE NURSING ASSESSMENT: Performed by: STUDENT IN AN ORGANIZED HEALTH CARE EDUCATION/TRAINING PROGRAM

## 2024-09-14 PROCEDURE — 250N000011 HC RX IP 250 OP 636: Performed by: PHYSICIAN ASSISTANT

## 2024-09-14 PROCEDURE — 250N000013 HC RX MED GY IP 250 OP 250 PS 637: Performed by: STUDENT IN AN ORGANIZED HEALTH CARE EDUCATION/TRAINING PROGRAM

## 2024-09-14 PROCEDURE — 27244 TREAT THIGH FRACTURE: CPT | Performed by: ANESTHESIOLOGY

## 2024-09-14 PROCEDURE — 250N000013 HC RX MED GY IP 250 OP 250 PS 637: Performed by: ANESTHESIOLOGY

## 2024-09-14 PROCEDURE — 0QS636Z REPOSITION RIGHT UPPER FEMUR WITH INTRAMEDULLARY INTERNAL FIXATION DEVICE, PERCUTANEOUS APPROACH: ICD-10-PCS | Performed by: STUDENT IN AN ORGANIZED HEALTH CARE EDUCATION/TRAINING PROGRAM

## 2024-09-14 PROCEDURE — 71045 X-RAY EXAM CHEST 1 VIEW: CPT

## 2024-09-14 PROCEDURE — 83036 HEMOGLOBIN GLYCOSYLATED A1C: CPT | Performed by: INTERNAL MEDICINE

## 2024-09-14 PROCEDURE — C1769 GUIDE WIRE: HCPCS | Performed by: STUDENT IN AN ORGANIZED HEALTH CARE EDUCATION/TRAINING PROGRAM

## 2024-09-14 DEVICE — TFNA FENESTRATED SCREW 115MM - STERILE
Type: IMPLANTABLE DEVICE | Site: HIP | Status: FUNCTIONAL
Brand: TFN-ADVANCE

## 2024-09-14 DEVICE — IMPLANTABLE DEVICE: Type: IMPLANTABLE DEVICE | Site: HIP | Status: FUNCTIONAL

## 2024-09-14 DEVICE — LOCKING SCREW FOR IM NAIL Ø 5MM/ 38MM/ XL25/ STERILE: Type: IMPLANTABLE DEVICE | Site: HIP | Status: FUNCTIONAL

## 2024-09-14 RX ORDER — ONDANSETRON 2 MG/ML
4 INJECTION INTRAMUSCULAR; INTRAVENOUS EVERY 6 HOURS PRN
Status: DISCONTINUED | OUTPATIENT
Start: 2024-09-14 | End: 2024-09-17 | Stop reason: HOSPADM

## 2024-09-14 RX ORDER — ACETAMINOPHEN 650 MG/1
650 SUPPOSITORY RECTAL EVERY 4 HOURS PRN
Status: DISCONTINUED | OUTPATIENT
Start: 2024-09-14 | End: 2024-09-14

## 2024-09-14 RX ORDER — NALOXONE HYDROCHLORIDE 0.4 MG/ML
0.2 INJECTION, SOLUTION INTRAMUSCULAR; INTRAVENOUS; SUBCUTANEOUS
Status: DISCONTINUED | OUTPATIENT
Start: 2024-09-14 | End: 2024-09-17 | Stop reason: HOSPADM

## 2024-09-14 RX ORDER — PRAVASTATIN SODIUM 40 MG
40 TABLET ORAL DAILY
COMMUNITY

## 2024-09-14 RX ORDER — POLYETHYLENE GLYCOL 3350 17 G/17G
17 POWDER, FOR SOLUTION ORAL DAILY
Status: DISCONTINUED | OUTPATIENT
Start: 2024-09-15 | End: 2024-09-17

## 2024-09-14 RX ORDER — SODIUM CHLORIDE, SODIUM LACTATE, POTASSIUM CHLORIDE, CALCIUM CHLORIDE 600; 310; 30; 20 MG/100ML; MG/100ML; MG/100ML; MG/100ML
INJECTION, SOLUTION INTRAVENOUS CONTINUOUS
Status: DISCONTINUED | OUTPATIENT
Start: 2024-09-14 | End: 2024-09-14 | Stop reason: HOSPADM

## 2024-09-14 RX ORDER — ACETAMINOPHEN 325 MG/1
975 TABLET ORAL EVERY 8 HOURS
Status: DISCONTINUED | OUTPATIENT
Start: 2024-09-14 | End: 2024-09-14

## 2024-09-14 RX ORDER — LIDOCAINE 40 MG/G
CREAM TOPICAL
Status: DISCONTINUED | OUTPATIENT
Start: 2024-09-14 | End: 2024-09-17 | Stop reason: HOSPADM

## 2024-09-14 RX ORDER — LIDOCAINE 40 MG/G
CREAM TOPICAL
Status: DISCONTINUED | OUTPATIENT
Start: 2024-09-14 | End: 2024-09-14

## 2024-09-14 RX ORDER — ACETAMINOPHEN 325 MG/1
975 TABLET ORAL EVERY 8 HOURS
Status: DISCONTINUED | OUTPATIENT
Start: 2024-09-15 | End: 2024-09-17 | Stop reason: HOSPADM

## 2024-09-14 RX ORDER — INSULIN LISPRO 100 [IU]/ML
INJECTION, SOLUTION INTRAVENOUS; SUBCUTANEOUS
COMMUNITY

## 2024-09-14 RX ORDER — DEXTROSE MONOHYDRATE 25 G/50ML
25-50 INJECTION, SOLUTION INTRAVENOUS
Status: DISCONTINUED | OUTPATIENT
Start: 2024-09-14 | End: 2024-09-17 | Stop reason: HOSPADM

## 2024-09-14 RX ORDER — ONDANSETRON 2 MG/ML
4 INJECTION INTRAMUSCULAR; INTRAVENOUS ONCE
Status: COMPLETED | OUTPATIENT
Start: 2024-09-14 | End: 2024-09-14

## 2024-09-14 RX ORDER — ASPIRIN 81 MG/1
81 TABLET ORAL DAILY
Status: DISCONTINUED | OUTPATIENT
Start: 2024-09-14 | End: 2024-09-16

## 2024-09-14 RX ORDER — HYDROMORPHONE HYDROCHLORIDE 2 MG/1
2 TABLET ORAL EVERY 4 HOURS PRN
Status: DISCONTINUED | OUTPATIENT
Start: 2024-09-14 | End: 2024-09-14

## 2024-09-14 RX ORDER — PROCHLORPERAZINE MALEATE 5 MG
5 TABLET ORAL EVERY 6 HOURS PRN
Status: DISCONTINUED | OUTPATIENT
Start: 2024-09-14 | End: 2024-09-17 | Stop reason: HOSPADM

## 2024-09-14 RX ORDER — BUPIVACAINE HYDROCHLORIDE AND EPINEPHRINE 2.5; 5 MG/ML; UG/ML
INJECTION, SOLUTION INFILTRATION; PERINEURAL
Status: COMPLETED | OUTPATIENT
Start: 2024-09-14 | End: 2024-09-14

## 2024-09-14 RX ORDER — AMOXICILLIN 250 MG
1 CAPSULE ORAL 2 TIMES DAILY PRN
Status: DISCONTINUED | OUTPATIENT
Start: 2024-09-14 | End: 2024-09-17 | Stop reason: HOSPADM

## 2024-09-14 RX ORDER — BISACODYL 10 MG
10 SUPPOSITORY, RECTAL RECTAL DAILY PRN
Status: DISCONTINUED | OUTPATIENT
Start: 2024-09-17 | End: 2024-09-17 | Stop reason: HOSPADM

## 2024-09-14 RX ORDER — ONDANSETRON 2 MG/ML
4 INJECTION INTRAMUSCULAR; INTRAVENOUS EVERY 6 HOURS PRN
Status: DISCONTINUED | OUTPATIENT
Start: 2024-09-14 | End: 2024-09-14

## 2024-09-14 RX ORDER — MAGNESIUM SULFATE 4 G/50ML
4 INJECTION INTRAVENOUS ONCE
Status: COMPLETED | OUTPATIENT
Start: 2024-09-14 | End: 2024-09-14

## 2024-09-14 RX ORDER — HYDROMORPHONE HCL IN WATER/PF 6 MG/30 ML
0.4 PATIENT CONTROLLED ANALGESIA SYRINGE INTRAVENOUS
Status: DISCONTINUED | OUTPATIENT
Start: 2024-09-14 | End: 2024-09-14

## 2024-09-14 RX ORDER — PROPOFOL 10 MG/ML
INJECTION, EMULSION INTRAVENOUS CONTINUOUS PRN
Status: DISCONTINUED | OUTPATIENT
Start: 2024-09-14 | End: 2024-09-14

## 2024-09-14 RX ORDER — DEXAMETHASONE SODIUM PHOSPHATE 4 MG/ML
4 INJECTION, SOLUTION INTRA-ARTICULAR; INTRALESIONAL; INTRAMUSCULAR; INTRAVENOUS; SOFT TISSUE
Status: DISCONTINUED | OUTPATIENT
Start: 2024-09-14 | End: 2024-09-14 | Stop reason: HOSPADM

## 2024-09-14 RX ORDER — LIDOCAINE HYDROCHLORIDE 10 MG/ML
INJECTION, SOLUTION INFILTRATION; PERINEURAL PRN
Status: DISCONTINUED | OUTPATIENT
Start: 2024-09-14 | End: 2024-09-14

## 2024-09-14 RX ORDER — SODIUM CHLORIDE, SODIUM LACTATE, POTASSIUM CHLORIDE, CALCIUM CHLORIDE 600; 310; 30; 20 MG/100ML; MG/100ML; MG/100ML; MG/100ML
INJECTION, SOLUTION INTRAVENOUS CONTINUOUS
Status: DISCONTINUED | OUTPATIENT
Start: 2024-09-14 | End: 2024-09-14

## 2024-09-14 RX ORDER — NALOXONE HYDROCHLORIDE 1 MG/ML
0.1 INJECTION INTRAMUSCULAR; INTRAVENOUS; SUBCUTANEOUS
Status: DISCONTINUED | OUTPATIENT
Start: 2024-09-14 | End: 2024-09-14 | Stop reason: HOSPADM

## 2024-09-14 RX ORDER — SODIUM CHLORIDE, SODIUM LACTATE, POTASSIUM CHLORIDE, CALCIUM CHLORIDE 600; 310; 30; 20 MG/100ML; MG/100ML; MG/100ML; MG/100ML
INJECTION, SOLUTION INTRAVENOUS CONTINUOUS
Status: DISCONTINUED | OUTPATIENT
Start: 2024-09-14 | End: 2024-09-16

## 2024-09-14 RX ORDER — FENTANYL CITRATE 50 UG/ML
100 INJECTION, SOLUTION INTRAMUSCULAR; INTRAVENOUS ONCE
Status: COMPLETED | OUTPATIENT
Start: 2024-09-14 | End: 2024-09-14

## 2024-09-14 RX ORDER — ONDANSETRON 4 MG/1
4 TABLET, ORALLY DISINTEGRATING ORAL EVERY 30 MIN PRN
Status: DISCONTINUED | OUTPATIENT
Start: 2024-09-14 | End: 2024-09-14 | Stop reason: HOSPADM

## 2024-09-14 RX ORDER — CALCIUM CARBONATE 500 MG/1
1000 TABLET, CHEWABLE ORAL 4 TIMES DAILY PRN
Status: DISCONTINUED | OUTPATIENT
Start: 2024-09-14 | End: 2024-09-17 | Stop reason: HOSPADM

## 2024-09-14 RX ORDER — ASPIRIN 81 MG/1
81 TABLET ORAL 2 TIMES DAILY
Status: DISCONTINUED | OUTPATIENT
Start: 2024-09-14 | End: 2024-09-17 | Stop reason: HOSPADM

## 2024-09-14 RX ORDER — LORAZEPAM 2 MG/ML
.5-1 INJECTION INTRAMUSCULAR
Status: DISCONTINUED | OUTPATIENT
Start: 2024-09-14 | End: 2024-09-14 | Stop reason: HOSPADM

## 2024-09-14 RX ORDER — HYDROMORPHONE HYDROCHLORIDE 1 MG/ML
0.5 INJECTION, SOLUTION INTRAMUSCULAR; INTRAVENOUS; SUBCUTANEOUS ONCE
Status: COMPLETED | OUTPATIENT
Start: 2024-09-14 | End: 2024-09-14

## 2024-09-14 RX ORDER — PROPOFOL 10 MG/ML
INJECTION, EMULSION INTRAVENOUS PRN
Status: DISCONTINUED | OUTPATIENT
Start: 2024-09-14 | End: 2024-09-14

## 2024-09-14 RX ORDER — ONDANSETRON 2 MG/ML
4 INJECTION INTRAMUSCULAR; INTRAVENOUS EVERY 30 MIN PRN
Status: DISCONTINUED | OUTPATIENT
Start: 2024-09-14 | End: 2024-09-14 | Stop reason: HOSPADM

## 2024-09-14 RX ORDER — GLYCOPYRROLATE 0.2 MG/ML
INJECTION, SOLUTION INTRAMUSCULAR; INTRAVENOUS PRN
Status: DISCONTINUED | OUTPATIENT
Start: 2024-09-14 | End: 2024-09-14

## 2024-09-14 RX ORDER — HYDROMORPHONE HCL IN WATER/PF 6 MG/30 ML
0.2 PATIENT CONTROLLED ANALGESIA SYRINGE INTRAVENOUS
Status: DISCONTINUED | OUTPATIENT
Start: 2024-09-14 | End: 2024-09-14

## 2024-09-14 RX ORDER — FENTANYL CITRATE 50 UG/ML
50 INJECTION, SOLUTION INTRAMUSCULAR; INTRAVENOUS EVERY 5 MIN PRN
Status: DISCONTINUED | OUTPATIENT
Start: 2024-09-14 | End: 2024-09-14 | Stop reason: HOSPADM

## 2024-09-14 RX ORDER — AMOXICILLIN 250 MG
2 CAPSULE ORAL 2 TIMES DAILY PRN
Status: DISCONTINUED | OUTPATIENT
Start: 2024-09-14 | End: 2024-09-17 | Stop reason: HOSPADM

## 2024-09-14 RX ORDER — ACETAMINOPHEN 325 MG/1
650 TABLET ORAL EVERY 4 HOURS PRN
Status: DISCONTINUED | OUTPATIENT
Start: 2024-09-14 | End: 2024-09-14

## 2024-09-14 RX ORDER — ONDANSETRON 4 MG/1
4 TABLET, ORALLY DISINTEGRATING ORAL EVERY 6 HOURS PRN
Status: DISCONTINUED | OUTPATIENT
Start: 2024-09-14 | End: 2024-09-14

## 2024-09-14 RX ORDER — NALOXONE HYDROCHLORIDE 0.4 MG/ML
0.4 INJECTION, SOLUTION INTRAMUSCULAR; INTRAVENOUS; SUBCUTANEOUS
Status: DISCONTINUED | OUTPATIENT
Start: 2024-09-14 | End: 2024-09-17 | Stop reason: HOSPADM

## 2024-09-14 RX ORDER — TRANEXAMIC ACID 100 MG/ML
INJECTION, SOLUTION INTRAVENOUS
Status: DISCONTINUED
Start: 2024-09-14 | End: 2024-09-14 | Stop reason: HOSPADM

## 2024-09-14 RX ORDER — OXYCODONE HYDROCHLORIDE 5 MG/1
10 TABLET ORAL EVERY 4 HOURS PRN
Status: DISCONTINUED | OUTPATIENT
Start: 2024-09-14 | End: 2024-09-17 | Stop reason: HOSPADM

## 2024-09-14 RX ORDER — MAGNESIUM HYDROXIDE 1200 MG/15ML
LIQUID ORAL PRN
Status: DISCONTINUED | OUTPATIENT
Start: 2024-09-14 | End: 2024-09-14 | Stop reason: HOSPADM

## 2024-09-14 RX ORDER — CEFAZOLIN SODIUM/WATER 2 G/20 ML
2 SYRINGE (ML) INTRAVENOUS EVERY 8 HOURS
Status: DISCONTINUED | OUTPATIENT
Start: 2024-09-14 | End: 2024-09-14

## 2024-09-14 RX ORDER — NICOTINE POLACRILEX 4 MG
15-30 LOZENGE BUCCAL
Status: DISCONTINUED | OUTPATIENT
Start: 2024-09-14 | End: 2024-09-17 | Stop reason: HOSPADM

## 2024-09-14 RX ORDER — AMOXICILLIN 250 MG
1 CAPSULE ORAL 2 TIMES DAILY
Status: DISCONTINUED | OUTPATIENT
Start: 2024-09-14 | End: 2024-09-17

## 2024-09-14 RX ORDER — MEPERIDINE HYDROCHLORIDE 25 MG/ML
12.5 INJECTION INTRAMUSCULAR; INTRAVENOUS; SUBCUTANEOUS EVERY 5 MIN PRN
Status: DISCONTINUED | OUTPATIENT
Start: 2024-09-14 | End: 2024-09-14 | Stop reason: HOSPADM

## 2024-09-14 RX ORDER — OXYCODONE HYDROCHLORIDE 5 MG/1
5 TABLET ORAL EVERY 4 HOURS PRN
Status: DISCONTINUED | OUTPATIENT
Start: 2024-09-14 | End: 2024-09-17 | Stop reason: HOSPADM

## 2024-09-14 RX ORDER — ONDANSETRON 4 MG/1
4 TABLET, ORALLY DISINTEGRATING ORAL EVERY 6 HOURS PRN
Status: DISCONTINUED | OUTPATIENT
Start: 2024-09-14 | End: 2024-09-17 | Stop reason: HOSPADM

## 2024-09-14 RX ORDER — CEFAZOLIN SODIUM 2 G/100ML
2 INJECTION, SOLUTION INTRAVENOUS EVERY 8 HOURS
Status: COMPLETED | OUTPATIENT
Start: 2024-09-14 | End: 2024-09-15

## 2024-09-14 RX ORDER — ACETAMINOPHEN 325 MG/1
975 TABLET ORAL ONCE
Status: COMPLETED | OUTPATIENT
Start: 2024-09-14 | End: 2024-09-14

## 2024-09-14 RX ORDER — FENTANYL CITRATE 50 UG/ML
25 INJECTION, SOLUTION INTRAMUSCULAR; INTRAVENOUS EVERY 5 MIN PRN
Status: DISCONTINUED | OUTPATIENT
Start: 2024-09-14 | End: 2024-09-14 | Stop reason: HOSPADM

## 2024-09-14 RX ORDER — ACETAMINOPHEN 325 MG/1
650 TABLET ORAL EVERY 4 HOURS PRN
Status: DISCONTINUED | OUTPATIENT
Start: 2024-09-17 | End: 2024-09-17 | Stop reason: HOSPADM

## 2024-09-14 RX ORDER — DEXAMETHASONE SODIUM PHOSPHATE 10 MG/ML
INJECTION, SOLUTION INTRAMUSCULAR; INTRAVENOUS PRN
Status: DISCONTINUED | OUTPATIENT
Start: 2024-09-14 | End: 2024-09-14

## 2024-09-14 RX ORDER — SODIUM CHLORIDE, SODIUM LACTATE, POTASSIUM CHLORIDE, CALCIUM CHLORIDE 600; 310; 30; 20 MG/100ML; MG/100ML; MG/100ML; MG/100ML
INJECTION, SOLUTION INTRAVENOUS CONTINUOUS PRN
Status: DISCONTINUED | OUTPATIENT
Start: 2024-09-14 | End: 2024-09-14

## 2024-09-14 RX ORDER — CEFAZOLIN SODIUM/WATER 3 G/30 ML
SYRINGE (ML) INTRAVENOUS PRN
Status: DISCONTINUED | OUTPATIENT
Start: 2024-09-14 | End: 2024-09-14

## 2024-09-14 RX ADMIN — PHENYLEPHRINE HYDROCHLORIDE 100 MCG: 10 INJECTION INTRAVENOUS at 16:01

## 2024-09-14 RX ADMIN — Medication 3 G: at 14:58

## 2024-09-14 RX ADMIN — SODIUM CHLORIDE, POTASSIUM CHLORIDE, SODIUM LACTATE AND CALCIUM CHLORIDE: 600; 310; 30; 20 INJECTION, SOLUTION INTRAVENOUS at 14:58

## 2024-09-14 RX ADMIN — GLYCOPYRROLATE 0.2 MG: 0.2 INJECTION INTRAMUSCULAR; INTRAVENOUS at 16:10

## 2024-09-14 RX ADMIN — PROPOFOL 130 MG: 10 INJECTION, EMULSION INTRAVENOUS at 15:00

## 2024-09-14 RX ADMIN — ROCURONIUM BROMIDE 40 MG: 50 INJECTION, SOLUTION INTRAVENOUS at 15:00

## 2024-09-14 RX ADMIN — MIDAZOLAM HYDROCHLORIDE 2 MG: 1 INJECTION, SOLUTION INTRAMUSCULAR; INTRAVENOUS at 14:47

## 2024-09-14 RX ADMIN — SODIUM CHLORIDE, POTASSIUM CHLORIDE, SODIUM LACTATE AND CALCIUM CHLORIDE: 600; 310; 30; 20 INJECTION, SOLUTION INTRAVENOUS at 14:54

## 2024-09-14 RX ADMIN — MAGNESIUM SULFATE HEPTAHYDRATE 4 G: 80 INJECTION, SOLUTION INTRAVENOUS at 14:16

## 2024-09-14 RX ADMIN — SUGAMMADEX 200 MG: 100 INJECTION, SOLUTION INTRAVENOUS at 17:00

## 2024-09-14 RX ADMIN — SENNOSIDES AND DOCUSATE SODIUM 1 TABLET: 8.6; 5 TABLET ORAL at 21:54

## 2024-09-14 RX ADMIN — HYDROMORPHONE HYDROCHLORIDE 0.5 MG: 1 INJECTION, SOLUTION INTRAMUSCULAR; INTRAVENOUS; SUBCUTANEOUS at 08:55

## 2024-09-14 RX ADMIN — FENTANYL CITRATE 25 MCG: 50 INJECTION, SOLUTION INTRAMUSCULAR; INTRAVENOUS at 17:45

## 2024-09-14 RX ADMIN — FENTANYL CITRATE 25 MCG: 50 INJECTION, SOLUTION INTRAMUSCULAR; INTRAVENOUS at 17:27

## 2024-09-14 RX ADMIN — HYDROMORPHONE HYDROCHLORIDE 1 MG: 1 INJECTION, SOLUTION INTRAMUSCULAR; INTRAVENOUS; SUBCUTANEOUS at 10:40

## 2024-09-14 RX ADMIN — PHENYLEPHRINE HYDROCHLORIDE 200 MCG: 10 INJECTION INTRAVENOUS at 16:28

## 2024-09-14 RX ADMIN — PHENYLEPHRINE HYDROCHLORIDE 100 MCG: 10 INJECTION INTRAVENOUS at 16:40

## 2024-09-14 RX ADMIN — PHENYLEPHRINE HYDROCHLORIDE 100 MCG: 10 INJECTION INTRAVENOUS at 15:38

## 2024-09-14 RX ADMIN — MEPERIDINE HYDROCHLORIDE 12.5 MG: 25 INJECTION INTRAMUSCULAR; INTRAVENOUS; SUBCUTANEOUS at 17:41

## 2024-09-14 RX ADMIN — ONDANSETRON 4 MG: 2 INJECTION INTRAMUSCULAR; INTRAVENOUS at 08:56

## 2024-09-14 RX ADMIN — TRANEXAMIC ACID 1 G: 1 INJECTION, SOLUTION INTRAVENOUS at 15:05

## 2024-09-14 RX ADMIN — ACETAMINOPHEN 975 MG: 325 TABLET ORAL at 14:13

## 2024-09-14 RX ADMIN — PERFLUTREN 3 ML: 6.52 INJECTION, SUSPENSION INTRAVENOUS at 12:28

## 2024-09-14 RX ADMIN — PHENYLEPHRINE HYDROCHLORIDE 100 MCG: 10 INJECTION INTRAVENOUS at 16:10

## 2024-09-14 RX ADMIN — PHENYLEPHRINE HYDROCHLORIDE 100 MCG: 10 INJECTION INTRAVENOUS at 15:46

## 2024-09-14 RX ADMIN — ASPIRIN 81 MG: 81 TABLET, COATED ORAL at 22:03

## 2024-09-14 RX ADMIN — GLYCOPYRROLATE 0.2 MG: 0.2 INJECTION INTRAMUSCULAR; INTRAVENOUS at 16:20

## 2024-09-14 RX ADMIN — CEFAZOLIN SODIUM 2 G: 2 INJECTION, SOLUTION INTRAVENOUS at 21:57

## 2024-09-14 RX ADMIN — FENTANYL CITRATE 50 MCG: 50 INJECTION, SOLUTION INTRAMUSCULAR; INTRAVENOUS at 17:38

## 2024-09-14 RX ADMIN — BUPIVACAINE HYDROCHLORIDE AND EPINEPHRINE BITARTRATE 20 ML: 2.5; .005 INJECTION, SOLUTION INFILTRATION; PERINEURAL at 14:36

## 2024-09-14 RX ADMIN — FENTANYL CITRATE 100 MCG: 50 INJECTION, SOLUTION INTRAMUSCULAR; INTRAVENOUS at 14:46

## 2024-09-14 RX ADMIN — DEXAMETHASONE SODIUM PHOSPHATE 8 MG: 10 INJECTION, SOLUTION INTRAMUSCULAR; INTRAVENOUS at 15:36

## 2024-09-14 RX ADMIN — PHENYLEPHRINE HYDROCHLORIDE 100 MCG: 10 INJECTION INTRAVENOUS at 16:20

## 2024-09-14 RX ADMIN — LIDOCAINE HYDROCHLORIDE 5 ML: 10 INJECTION, SOLUTION INFILTRATION; PERINEURAL at 15:00

## 2024-09-14 RX ADMIN — ROCURONIUM BROMIDE 10 MG: 50 INJECTION, SOLUTION INTRAVENOUS at 15:37

## 2024-09-14 RX ADMIN — PROPOFOL 150 MCG/KG/MIN: 10 INJECTION, EMULSION INTRAVENOUS at 15:03

## 2024-09-14 ASSESSMENT — ACTIVITIES OF DAILY LIVING (ADL)
ADLS_ACUITY_SCORE: 35
ADLS_ACUITY_SCORE: 24
ADLS_ACUITY_SCORE: 35
ADLS_ACUITY_SCORE: 35
ADLS_ACUITY_SCORE: 24
ADLS_ACUITY_SCORE: 24
ADLS_ACUITY_SCORE: 35
ADLS_ACUITY_SCORE: 24
ADLS_ACUITY_SCORE: 35
ADLS_ACUITY_SCORE: 24
ADLS_ACUITY_SCORE: 35
ADLS_ACUITY_SCORE: 24

## 2024-09-14 ASSESSMENT — COLUMBIA-SUICIDE SEVERITY RATING SCALE - C-SSRS
1. IN THE PAST MONTH, HAVE YOU WISHED YOU WERE DEAD OR WISHED YOU COULD GO TO SLEEP AND NOT WAKE UP?: NO
2. HAVE YOU ACTUALLY HAD ANY THOUGHTS OF KILLING YOURSELF IN THE PAST MONTH?: NO
6. HAVE YOU EVER DONE ANYTHING, STARTED TO DO ANYTHING, OR PREPARED TO DO ANYTHING TO END YOUR LIFE?: NO

## 2024-09-14 ASSESSMENT — LIFESTYLE VARIABLES: TOBACCO_USE: 1

## 2024-09-14 NOTE — ED NOTES
Patient stated that he hasn't been able to pee in 12 hours. Bladder scanned patient and found 564 ml. Received permission to straight cath. Straight cathed with 16 F coude and had 900 ml output.

## 2024-09-14 NOTE — ED PROVIDER NOTES
Emergency Department Midlevel Supervisory Note     I personally saw the patient and performed a substantive portion of the visit including all aspects of the medical decision making. I personally made/approved the management plan and take responsibility for the patient management.      ED Course:  9:57 AM Casandra Vee PA-C staffed patient with me. I agree with their assessment and plan of management, and I will see the patient.  10:02 AM I met with the patient to introduce myself, gather additional history, perform my initial exam, and discuss the plan.       74 year old male, history of IDDM and HLD, who presents for evaluation of right hip pain after a mechanical fall that occurred this morning. Denies acute weakness / paresthesias to the RLE.    On exam, CMS RLE is intact.    X-rays right hip / pelvis demonstrated an intertrochanteric fracture right hip with no dislocation; left hip and pelvis negative for fracture.     [facility] consulted; will perform surgery today.     EKG with NSR with PACs, prolonged QT (499ms), RBBB and no ST-T wave changes to suggest ACS. Stat echo ordered for pre-op clearance.    CXR performed for pre-op evaluation and demonstrated no focal airspace opacities, pleural effusion, pneumothorax or pulmonary edema.     Labs remarkable for leukocytosis (WBC 14.7) with no anemia (Hb 14.6). No significant electrolyte derangements or renal impairment.    Nothing in history or on exam to suggest significant head, cervical spine, chest, abdominal or back injury and I do not think emergent imaging to evaluate for such are indicated.    Patient admitted to Hospitalist Service. Patient stable throughout ED course.    FINAL IMPRESSION:    ICD-10-CM    1. Hip fracture, right, closed, initial encounter (H)  S72.001A Case Request: INTERNAL FIXATION, FRACTURE, TROCHANTERIC, HIP, USING PINS OR RODS     Case Request: INTERNAL FIXATION, FRACTURE, TROCHANTERIC, HIP, USING PINS OR RODS       2. Fall, initial encounter  W19.XXXA       3. Right hip pain  M25.551           MEDICATIONS GIVEN IN THE EMERGENCY DEPARTMENT:  Medications   lidocaine 1 % 0.1-1 mL (has no administration in time range)   lidocaine (LMX4) cream (has no administration in time range)   sodium chloride (PF) 0.9% PF flush 3 mL (has no administration in time range)   sodium chloride (PF) 0.9% PF flush 3 mL (has no administration in time range)   senna-docusate (SENOKOT-S/PERICOLACE) 8.6-50 MG per tablet 1 tablet (has no administration in time range)     Or   senna-docusate (SENOKOT-S/PERICOLACE) 8.6-50 MG per tablet 2 tablet (has no administration in time range)   calcium carbonate (TUMS) chewable tablet 1,000 mg (has no administration in time range)   acetaminophen (TYLENOL) tablet 650 mg (has no administration in time range)     Or   acetaminophen (TYLENOL) Suppository 650 mg (has no administration in time range)   HYDROmorphone (DILAUDID) half-tab 1 mg (has no administration in time range)   HYDROmorphone (DILAUDID) tablet 2 mg (has no administration in time range)   HYDROmorphone (DILAUDID) injection 0.2 mg (has no administration in time range)   HYDROmorphone (DILAUDID) injection 0.4 mg (has no administration in time range)   ondansetron (ZOFRAN ODT) ODT tab 4 mg (has no administration in time range)     Or   ondansetron (ZOFRAN) injection 4 mg (has no administration in time range)   HYDROmorphone (PF) (DILAUDID) injection 0.5 mg (0.5 mg Intravenous $Given 9/14/24 0855)   ondansetron (ZOFRAN) injection 4 mg (4 mg Intravenous $Given 9/14/24 0856)   HYDROmorphone (DILAUDID) injection 1 mg (1 mg Intravenous $Given 9/14/24 1040)       NEW PRESCRIPTIONS STARTED AT TODAY'S ED VISIT:  New Prescriptions    No medications on file       CHIEF COMPLAINT:  Fall and Hip Pain      Triage Note:  Presents via [facility] from home where he slipped and fell in the kitchen. Denies LOC, thinners, hitting head and neck pain. Endorses right hip pain. Per  "EMS patient was able to stand with some pain to that area. Rating pain 4/10 on arrival. Patient is diabetic.      HPI     HPI     Cam Hernandez is a 74 year old male with a pertinent history of IDDM with diabetic neuropathy, HLD and obesity, who presents to this ED by EMS for evaluation of right hip pain.    At 7:30 AM this morning (_2.5 hours ago), the patient was letting his cat into the house and walked back into the kitchen where his left foot stumbled, causing him to slip and \"do the splits.\" He endorses right hip pain, which has slightly improved after pain medication here in the ED. He did not hit his head during this incident and denies headache and neck pain. He is not anticoagulated. He also denies chest pain, shortness of breath, abdominal pain and back pain.      Patient reports that at baseline, he has numbness in his feet due to diabetic neuropathy and denies new numbness.     Medical History     History reviewed. No pertinent past medical history.    History reviewed. No pertinent surgical history.    History reviewed. No pertinent family history.    Social History     Tobacco Use    Smoking status: Former     Types: Cigars     Quit date:      Years since quittin.7    Smokeless tobacco: Never       aspirin (ASA) 81 MG EC tablet  Barberry-Oreg Grape-Goldenseal (BERBERINE COMPLEX PO)  blood glucose (NO BRAND SPECIFIED) test strip  Cholecalciferol (VITAMIN D-3) 125 MCG (5000 UT) TABS  Coenzyme Q10 (CO Q-10) 400 MG CAPS  HERBALS  insulin aspart (NOVOLOG VIAL) 100 UNITS/ML vial  INSULIN PUMP - OUTPATIENT  Multiple Vitamins-Minerals (MULTIVITAMIN MEN) TABS  polyethylene glycol (MIRALAX) 17 GM/Dose powder  probiotic CAPS  simvastatin (ZOCOR) 40 MG tablet        Physical Exam     First Vitals:  Patient Vitals for the past 24 hrs:   BP Temp Temp src Pulse Resp SpO2 Height Weight   24 1100 (!) 148/67 -- -- 89 21 -- -- --   24 1045 (!) 170/72 -- -- 83 (!) 9 94 % -- --   24 1044 -- -- " "-- -- 16 -- 1.854 m (6' 1\") 120.2 kg (265 lb)   09/14/24 1014 (!) 159/74 -- -- 82 -- 93 % -- --   09/14/24 0959 (!) 168/84 -- -- 85 -- 98 % -- --   09/14/24 0839 138/68 -- -- 71 -- 98 % -- --   09/14/24 0834 -- 98.1  F (36.7  C) Oral -- -- -- -- --       PHYSICAL EXAM:   Physical Exam    GENERAL: Awake, alert.  In mild acute distress with any movement of RLE.   HEENT: Normocephalic, atraumatic.   NECK: No stridor.  PULMONARY: Symmetrical breath sounds without distress.  Lungs clear to auscultation bilaterally without wheezes, rhonchi or rales.  CARDIO: Regular rate and rhythm.  No significant murmur, rub or gallop.    ABDOMINAL: Abdomen soft, non-distended and non-tender to palpation.   EXTREMITIES: Mild pitting edema to the mid-tibia, BL lower extremities. The right hip is tender to palpation. The RLE is warm and well perfused with strong and symmetrical DP and TP pulses. Patient able to plantarflex and dorsiflex the ankle with sensation to light touch grossly intact.       NEURO: Alert and oriented to person, place and time.  Cranial nerves grossly intact.  No focal motor deficit.  PSYCH: Normal mood and affect.  SKIN: No right hip lacerations.     Results     LAB:  All pertinent labs reviewed and interpreted  Labs Ordered and Resulted from Time of ED Arrival to Time of ED Departure   CBC WITH PLATELETS - Abnormal       Result Value    WBC Count 14.7 (*)     RBC Count 4.88      Hemoglobin 14.6      Hematocrit 44.4      MCV 91      MCH 29.9      MCHC 32.9      RDW 13.3      Platelet Count 300     BASIC METABOLIC PANEL - Abnormal    Sodium 142      Potassium 4.2      Chloride 106      Carbon Dioxide (CO2) 27      Anion Gap 9      Urea Nitrogen 22.6      Creatinine 1.17      GFR Estimate 65      Calcium 8.7 (*)     Glucose 142 (*)    GLUCOSE MONITOR NURSING POCT   GLUCOSE MONITOR NURSING POCT       RADIOLOGY:  XR Chest 1 View   Final Result   IMPRESSION: No focal airspace opacities, pleural effusion or pneumothorax. " No pulmonary edema. The cardiac and mediastinal silhouettes are normal.      XR Pelvis and Hip Right 2 Views   Final Result   IMPRESSION: Intertrochanteric fracture right hip. No dislocation. Degenerative change at both hip joints. Left hip negative for fracture. Pelvis negative for fracture.      Echocardiogram Complete    (Results Pending)       EC2024, 10:49; NSR with rate of 85 bpm; PACs; prolonged QT (499ms); RBBB; no ST-T wave changes suggestive of ACS; compared to previous EKG dated 2008, PACs are new, RBBB is new, QT interval has lengthened    EKG independently reviewed and interpreted by Flavia Goddard MD      I, Kassy Holbrook, am serving as a scribe to document services personally performed by Flavia Goddard MD based on my observation and the provider's statements to me. I, Flavia Goddard MD attest that Kassy Holbrook is acting in a scribe capacity, has observed my performance of the services and has documented them in accordance with my direction.    Flavia Goddard MD  Emergency Medicine  [facility] EMERGENCY DEPARTMENT           Flavia Goddard MD  [phone]

## 2024-09-14 NOTE — H&P
[facility]    History and Physical - Hospitalist Service       Date of Admission:  9/14/2024  Cam Hernandez is a 74 year old male with a pertinent history of T1DM, diabetic neuropathy, and HLD who presents to this ED by EMS for evaluation of right hip pain.       Assessment & Plan    Mechanical fall, no chest pain no head injuries, history of peripheral neuropathy due to DM, PT OT evaluation    Right intertrochanteric hip fracture post fall, lower extremity exam with no neurovascular compromise, consult orthopedic, pain control started, titrate to pain    Type 1 diabetes on insulin pump at home, has pharmacy to place order for insulin pump if able per protocol, Premeal glucose checks ordered, follow glucose closely    Hyperlipidemia continue home meds, lipid panel for a.m.    Morbid obesity, follow-up in primary care    NBA resume CPAP, as able, RT to assess    Systolic sclerosis unclear if on treatment, follow-up in primary care    Diabetic neuropathy, PT and OT evaluation, follow-up in primary care    Preop evaluation  Patient with no active cardiac or renal disease, however he does admit to SOB with exertion, also noted on exam with bilateral pitting leg edema, EKG with no acute ischemia however will check echo ASAP prior to surgical procedure to rule out decompensated heart failure, therefore he is not medically optimized currently, check routine labs and chest x-ray today     Full code per his wishes, discussed at bedside      Addendum  Labs showed mild leukocytosis, likely stress reaction as no evidence for infection.  Chest x-ray no focal opacity  Echo per cardiology verbal report looks okay, pending formal  Given need for urgent procedure, with no current cardiac symptoms and echo with no abnormalities, patient is currently medically optimized to proceed with surgery.  This is not without risk per discussion with family and patient     Diet: NPO for Medical/Clinical Reasons Except  for: No Exceptions    DVT Prophylaxis: Low Risk/Ambulatory with no VTE prophylaxis indicated  Allison Catheter: Not present  Lines: None     Cardiac Monitoring: None  Code Status:  Full code    Clinically Significant Risk Factors Present on Admission                # Drug Induced Platelet Defect: home medication list includes an antiplatelet medication                            Disposition Plan     Medically Ready for Discharge: Anticipated in 2-4 Days           Duong Moreno MD  Hospitalist Service  [facility]  Securely message with RingTu (more info)  Text page via MyWave Paging/Directory     ______________________________________________________________________    Chief Complaint   Mechanical fall, right hip pain today    History is obtained from the patient    History of Present Illness   74 year old male with PMH diabetic neuropathy, type 1 diabetes, obesity, systemic sclerosis presents to the Emergency Department for evaluation of fall, right hip pain. Patient endorses mechanical fall, slipped today at home while getting out of the car, he does have a history of peripheral neuropathy and has frequent falls.  Fell on his right side and had pain in right hip.  Difficult to ambulate.  No head injuries, no chest pain prior, no other injuries no pain complaints.    Patient ambulates with a walker at home, only able to walk a few blocks and he does endorse SOB with exertion, no chest pain, he was evaluated for irregular heart rate a year ago unclear diagnosis but reportedly he was found to have an clear echo and EKG.  No symptoms since.  On physical exam patient was noted with bilateral edema, denies PND orthopnea, no cough no fever or chills.    In the ED he was found to have stable vital signs, not in severe painful distress, x-ray showed right hip fracture, orthopedics was consulted      Past Medical History    History reviewed. No pertinent past medical history.    Past Surgical History    History reviewed. No pertinent surgical history.    Prior to Admission Medications   Prior to Admission Medications   Prescriptions Last Dose Informant Patient Reported? Taking?   Barberry-Oreg Grape-Goldenseal (BERBERINE COMPLEX PO)   Yes No   Sig: Take 1 capsule by mouth daily   Cholecalciferol (VITAMIN D-3) 125 MCG (5000 UT) TABS   Yes No   Sig: Take 5,000 Units by mouth daily    Coenzyme Q10 (CO Q-10) 400 MG CAPS   Yes No   Sig: Take 1 capsule by mouth daily    HERBALS   Yes No   Sig: Take 1 each by mouth daily (Mushroom complex 6)   INSULIN PUMP - OUTPATIENT   Yes No   Sig: [facility]   Multiple Vitamins-Minerals (MULTIVITAMIN MEN) TABS   Yes No   Sig: Take 1 tablet by mouth daily   aspirin (ASA) 81 MG EC tablet   Yes No   Sig: Take 81 mg by mouth daily    blood glucose (NO BRAND SPECIFIED) test strip   Yes No   Sig: Use to test blood sugar 6-7 times daily or as directed.   insulin aspart (NOVOLOG VIAL) 100 UNITS/ML vial   Yes No   Sig: INJECT 60 UNITS UNDER THE SKIN AS DIRECTED. BOLUS UP TO 30 UNITS DAILY.  UNITS DAILY. USE WITH INSULIN PUMP   polyethylene glycol (MIRALAX) 17 GM/Dose powder   Yes No   Sig: Take 1 capful by mouth daily as needed for constipation   probiotic CAPS   Yes No   Sig: Take 1 capsule by mouth daily (Zenwise- pre-and pro-biotic)   simvastatin (ZOCOR) 40 MG tablet   Yes No   Sig: Take 40 mg by mouth every evening       Facility-Administered Medications: None        Physical Exam   Vital Signs: Temp: 98.1  F (36.7  C) Temp src: Oral BP: 138/68 Pulse: 71     SpO2: 98 %      Weight: 0 lbs 0 oz    General Appearance: AAOx3, high BMI  Respiratory: Clear anteriorly  Cardiovascular: S1-S2 regular rhythm  GI: Soft nontender, bowel sounds heard  Skin: No erythema  Other: Bilateral pitting edema, right leg external rotation    Medical Decision Making       7 0 MINUTES SPENT BY ME on the date of service doing chart review, history, exam, documentation & further activities  per the note.      Data   Imaging results reviewed over the past 24 hrs:   Recent Results (from the past 24 hour(s))   XR Pelvis and Hip Right 2 Views    Narrative    EXAM: XR PELVIS AND HIP RIGHT 2 VIEWS  LOCATION: [facility]  DATE: 9/14/2024    INDICATION: Fall, hip pain  COMPARISON: None.      Impression    IMPRESSION: Intertrochanteric fracture right hip. No dislocation. Degenerative change at both hip joints. Left hip negative for fracture. Pelvis negative for fracture.   XR Chest 1 View    Narrative    EXAM: XR CHEST 1 VIEW  LOCATION: [facility]  DATE: 9/14/2024    INDICATION: Hip Fracture  COMPARISON: None.      Impression    IMPRESSION: No focal airspace opacities, pleural effusion or pneumothorax. No pulmonary edema. The cardiac and mediastinal silhouettes are normal.

## 2024-09-14 NOTE — ED PROVIDER NOTES
EMERGENCY DEPARTMENT ENCOUNTER      NAME: Cam Hernandez  AGE: 74 year old male  YOB: 1950  MRN: [id number]  EVALUATION DATE & TIME: 2024  8:33 AM    PCP: Soto Gaston    ED PROVIDER: Casandra Larsen PA-C      Chief Complaint   Patient presents with    Fall    Hip Pain         FINAL IMPRESSION:  1. Hip fracture, right, closed, initial encounter (H)    2. Fall, initial encounter    3. Right hip pain          ED COURSE & MEDICAL DECISION MAKIN:42 AM I introduced myself to patient, performed initial HPI and examination.   10:01 AM staffed with Dr. Goddard  10:05 AM paged ortho, spoke with patient.  10:31 AM spoke with Dr. Talavera who says they will try to get patient in today. Requests H&P performed. Message sent to hospitalist.     74 year old male with PMH diabetic neuropathy, type 1 diabetes, obesity, systemic sclerosis presents to the Emergency Department for evaluation of fall, right hip pain. Patient endorses mechanical fall, slipped and essentially fell into splits with left leg forward. Unable to ambulate since then. Denies hitting head, no LOC. No other associated injuries. Not on blood thinning medicines.     Differential includes: Muscle strain, fracture, dislocation, others.  No other associated injuries to warrant additional imaging - no head injury/LOC.     VSS, afebrile.  Exam with tenderness along lateral right hip. No obvious shortening or malrotation. Distal CMS intact.    X-ray shows right intertrochanteric fracture right hip. No dislocation.    Patient was given dilaudid, Zofran.  Additional labs/work up ordered pre-operative. Patient is NPO since this morning.    Spoke with orthopedics, plan for OR intervention today.  Spoke with hospitalist, who accepts admission. Medical clearance examination performed, plan for stat echo prior to OR intervention. Will be done in the ED.       Medical Decision Making  Obtained supplemental history:Supplemental history obtained?:  No  Reviewed external records: External records reviewed?: Documented in chart  Care impacted by chronic illness:Diabetes and Hyperlipidemia  Care significantly affected by social determinants of health:N/A  Did you consider but not order tests?: Work up considered but not performed and documented in chart, if applicable  Did you interpret images independently?: Independent interpretation of ECG and images noted in documentation, when applicable.  Consultation discussion with other provider:Did you involve another provider (consultant, , pharmacy, etc.)?: I discussed the care with another health care provider, see documentation for details.  Admit.  Not Applicable        MEDICATIONS GIVEN IN THE EMERGENCY:  Medications   lidocaine 1 % 0.1-1 mL (has no administration in time range)   lidocaine (LMX4) cream (has no administration in time range)   sodium chloride (PF) 0.9% PF flush 3 mL (has no administration in time range)   sodium chloride (PF) 0.9% PF flush 3 mL (has no administration in time range)   senna-docusate (SENOKOT-S/PERICOLACE) 8.6-50 MG per tablet 1 tablet (has no administration in time range)     Or   senna-docusate (SENOKOT-S/PERICOLACE) 8.6-50 MG per tablet 2 tablet (has no administration in time range)   calcium carbonate (TUMS) chewable tablet 1,000 mg (has no administration in time range)   acetaminophen (TYLENOL) tablet 650 mg (has no administration in time range)     Or   acetaminophen (TYLENOL) Suppository 650 mg (has no administration in time range)   HYDROmorphone (DILAUDID) half-tab 1 mg (has no administration in time range)   HYDROmorphone (DILAUDID) tablet 2 mg (has no administration in time range)   HYDROmorphone (DILAUDID) injection 0.2 mg (has no administration in time range)   HYDROmorphone (DILAUDID) injection 0.4 mg (has no administration in time range)   ondansetron (ZOFRAN ODT) ODT tab 4 mg (has no administration in time range)     Or   ondansetron (ZOFRAN) injection 4 mg (has no  administration in time range)   HYDROmorphone (PF) (DILAUDID) injection 0.5 mg (0.5 mg Intravenous $Given 9/14/24 0855)   ondansetron (ZOFRAN) injection 4 mg (4 mg Intravenous $Given 9/14/24 0856)   HYDROmorphone (DILAUDID) injection 1 mg (1 mg Intravenous $Given 9/14/24 1040)       NEW PRESCRIPTIONS STARTED AT TODAY'S ER VISIT  New Prescriptions    No medications on file          =================================================================    HPI    Patient information was obtained from: patient    Use of : N/A       Cam Hernandez is a 74 year old male with a pertinent history of diabetes, diabetic neuropathy, HLD, and morbid obesity who presents to this ED by EMS for evaluation of hip pain.    Patient stated that this morning (~ 1 hour ago) he was letting is cat back into his home when he tripped. He mentioned that his left flip flop folded over at the toes, causing him to slip and do the splits. Patient's left foot went forward and his right leg went backwards. He said that he did not hear a pop or a crack, but had pain in his right hip that is still present. He stayed on the ground for 10-15 minutes. He reported that he can't put weight on his right leg. Patient denied any abdominal pain.     REVIEW OF SYSTEMS   ROS negative unless otherwise stated in HPI    PAST MEDICAL HISTORY:  History reviewed. No pertinent past medical history.    PAST SURGICAL HISTORY:  History reviewed. No pertinent surgical history.    CURRENT MEDICATIONS:    aspirin (ASA) 81 MG EC tablet  Barberry-Oreg Grape-Goldenseal (BERBERINE COMPLEX PO)  Cholecalciferol (VITAMIN D-3) 125 MCG (5000 UT) TABS  HERBALS  insulin aspart (NOVOLOG VIAL) 100 UNITS/ML vial  INSULIN PUMP - OUTPATIENT  Multiple Vitamins-Minerals (MULTIVITAMIN MEN) TABS  polyethylene glycol (MIRALAX) 17 GM/Dose powder  pravastatin (PRAVACHOL) 40 MG tablet  probiotic CAPS  blood glucose (NO BRAND SPECIFIED) test strip        ALLERGIES:  Allergies   Allergen  "Reactions    Ammonium Lac Itching     Legs very red, excessive itching    Amoxicillin Nausea     Other reaction(s): nausea    Erythromycin Nausea    Naprosyn [Naproxen] Unknown       FAMILY HISTORY:  History reviewed. No pertinent family history.    SOCIAL HISTORY:   Social History     Socioeconomic History    Marital status:    Tobacco Use    Smoking status: Former     Types: Cigars     Quit date:      Years since quittin.7    Smokeless tobacco: Never     Social Determinants of Health      Received from [employer] [facility]    Financial Resource Strain    Received from [employer] [facility]    Social Connections       VITALS:  BP (!) 157/68   Pulse 93   Temp 98.1  F (36.7  C) (Oral)   Resp 21   Ht 1.854 m (6' 1\")   Wt 120.2 kg (265 lb)   SpO2 99%   BMI 34.96 kg/m      PHYSICAL EXAM    Constitutional: Well developed, Well nourished, NAD, GCS 15   HENT: Normocephalic, Atraumatic  Neck- Supple, Nontender. Normal ROM.  Eyes: Conjunctiva normal.   Respiratory: No respiratory distress, speaking in full sentences. Normal breath sounds  Cardiovascular: Normal heart rate, Regular rhythm, No murmurs. Dorsalis pedis and posterior tibialis pulses  GI: Soft, nontender.   Musculoskeletal: Right Leg: No notable leg length discrepancy or malrotation. Tenderness along right lateral hip, limited ROM secondary to pain. Extremity otherwise nontender. Remainder MSK unremarkable  Integument: Warm, Dry, No erythema, ecchymosis, or rash.  Neurologic: Alert & oriented x 3, Normal sensory function. No focal deficits.   Psychiatric: Affect normal, Judgment normal, Mood normal. Cooperative.      LAB:  All pertinent labs reviewed and interpreted.  Results for orders placed or performed during the hospital encounter of 24   XR Pelvis and Hip Right 2 Views    Impression    IMPRESSION: Intertrochanteric fracture right hip. No dislocation. Degenerative change at both hip " joints. Left hip negative for fracture. Pelvis negative for fracture.   XR Chest 1 View    Impression    IMPRESSION: No focal airspace opacities, pleural effusion or pneumothorax. No pulmonary edema. The cardiac and mediastinal silhouettes are normal.   CBC (+ platelets, no diff)   Result Value Ref Range    WBC Count 14.7 (H) 4.0 - 11.0 10e3/uL    RBC Count 4.88 4.40 - 5.90 10e6/uL    Hemoglobin 14.6 13.3 - 17.7 g/dL    Hematocrit 44.4 40.0 - 53.0 %    MCV 91 78 - 100 fL    MCH 29.9 26.5 - 33.0 pg    MCHC 32.9 31.5 - 36.5 g/dL    RDW 13.3 10.0 - 15.0 %    Platelet Count 300 150 - 450 10e3/uL   Basic metabolic panel   Result Value Ref Range    Sodium 142 135 - 145 mmol/L    Potassium 4.2 3.4 - 5.3 mmol/L    Chloride 106 98 - 107 mmol/L    Carbon Dioxide (CO2) 27 22 - 29 mmol/L    Anion Gap 9 7 - 15 mmol/L    Urea Nitrogen 22.6 8.0 - 23.0 mg/dL    Creatinine 1.17 0.67 - 1.17 mg/dL    GFR Estimate 65 >60 mL/min/1.73m2    Calcium 8.7 (L) 8.8 - 10.4 mg/dL    Glucose 142 (H) 70 - 99 mg/dL       RADIOLOGY:  Reviewed all pertinent imaging. Please see official radiology report.  XR Chest 1 View   Final Result   IMPRESSION: No focal airspace opacities, pleural effusion or pneumothorax. No pulmonary edema. The cardiac and mediastinal silhouettes are normal.      XR Pelvis and Hip Right 2 Views   Final Result   IMPRESSION: Intertrochanteric fracture right hip. No dislocation. Degenerative change at both hip joints. Left hip negative for fracture. Pelvis negative for fracture.      Echocardiogram Complete    (Results Pending)       PROCEDURES:   None      I, Mayo Gonzales, am serving as a scribe to document services personally performed by Casandra Larsen PA-C based on my observation and the provider's statements to me. I, Casandra Larsen PA-C, attest that Mayo Gonzales is acting in a scribe capacity, has observed my performance of the services and has documented them in accordance with my direction.    Casanrda Larsne  BRITTNEY  Emergency Medicine  [facility] EMERGENCY DEPARTMENT  [facility] [facility] [address]  [phone]             Casandra Larsen PA-C  09/14/24 1146

## 2024-09-14 NOTE — ANESTHESIA PROCEDURE NOTES
Airway       Patient location during procedure: OR       Procedure Start/Stop Times: 9/14/2024 3:03 PM  Staff -        CRNA: Karol Damon APRN CRNA       Performed By: CRNA  Consent for Airway        Urgency: elective  Indications and Patient Condition       Indications for airway management: evelio-procedural       Induction type:intravenous       Mask difficulty assessment: 3 - difficult mask (inadequate, unstable, or two providers) +/- NMBA    Final Airway Details       Final airway type: endotracheal airway       Successful airway: ETT - single and Oral  Endotracheal Airway Details        ETT size (mm): 7.5       Cuffed: yes       Successful intubation technique: video laryngoscopy       VL Blade Size: Glidescope 4       Grade View of Cords: 1       Adjucts: stylet       Position: Right       Measured from: lips       Secured at (cm): 2       Bite block used: None    Post intubation assessment        Placement verified by: capnometry, equal breath sounds and chest rise        Number of attempts at approach: 1       Number of other approaches attempted: 0       Secured with: tape       Ease of procedure: easy       Dentition: Intact and Unchanged    Medication(s) Administered   Medication Administration Time: 9/14/2024 3:03 PM

## 2024-09-14 NOTE — ED NOTES
Bed: [facility]  Expected date:   Expected time:   Means of arrival: Ambulance  Comments:  WBL: Fall, hip pain

## 2024-09-14 NOTE — ANESTHESIA POSTPROCEDURE EVALUATION
Patient: Cam Hernandez    Procedure: Procedure(s):  INTERNAL FIXATION, FRACTURE, TROCHANTERIC, HIP, USING NAIL       Anesthesia Type:  General    Note:  Disposition: Inpatient   Postop Pain Control: Uneventful            Sign Out: Well controlled pain   PONV: No   Neuro/Psych: Uneventful            Sign Out: Acceptable/Baseline neuro status   Airway/Respiratory: Uneventful            Sign Out: Acceptable/Baseline resp. status   CV/Hemodynamics: Uneventful            Sign Out: Acceptable CV status; No obvious hypovolemia; No obvious fluid overload   Other NRE: NONE   DID A NON-ROUTINE EVENT OCCUR? No    Event details/Postop Comments:  Doing well and without complaints.       Last vitals:  Vitals Value Taken Time   /72 09/14/24 1745   Temp 36.5  C (97.7  F) 09/14/24 1755   Pulse 90 09/14/24 1755   Resp 23 09/14/24 1755   SpO2 100 % 09/14/24 1755   Vitals shown include unfiled device data.    Electronically Signed By: Alvaro Crowley MD  September 14, 2024  5:57 PM

## 2024-09-14 NOTE — PHARMACY-ADMISSION MEDICATION HISTORY
"Pharmacist Admission Medication History    Admission medication history is complete. The information provided in this note is only as accurate as the sources available at the time of the update.    Information Source(s): Patient via in-person    Pertinent Information:   Insulin pump settings: Basal [id number] = 1.2 unit/hr, [id number] = 1.9 units/hr  Bolus correction: patient keeps log at home adjusts per carbs at meal and activity level during the day.  Patient stated that \" uses 7.5 units with carb heavy meal and less activity, and 5 units with carb light meal and more activity\"    Changes made to PTA medication list:  Added: Pravastatin  Deleted: Simvastatin, Coenzyme Q10  Changed: None    Allergies reviewed with patient and updates made in EHR: yes    Medication History Completed By: Tiffanie Leiva [facility] 9/14/2024 11:41 AM    PTA Med List   Medication Sig Last Dose    aspirin (ASA) 81 MG EC tablet Take 81 mg by mouth daily  9/13/2024 at PM    Barberry-Oreg Grape-Goldenseal (BERBERINE COMPLEX PO) Take 1 capsule by mouth daily 9/13/2024 at PM    Cholecalciferol (VITAMIN D-3) 125 MCG (5000 UT) TABS Take 5,000 Units by mouth daily  9/13/2024 at PM    HERBALS Take 1 each by mouth daily (Mushroom complex 6) 9/13/2024 at PM    insulin lispro (HUMALOG VIAL) 100 UNIT/ML vial Inject subcutaneously 3 times daily (before meals). INJECT 60 UNITS UNDER THE SKIN AS DIRECTED. BOLUS UP TO 30 UNITS DAILY.  UNITS DAILY. USE WITH INSULIN PUMP 9/14/2024    INSUL[facility] - OUTPATIENT [facility] 9/14[facility]    Multiple Vitamins-Minerals (MULTIVITAMIN MEN) TABS Take 1 tablet by mouth daily 9/13/2024 at PM    polyethylene glycol (MIRALAX) 17 GM/Dose powder Take 1 capful by mouth daily as needed for constipation 9/13/2024 at AM    pravastatin (PRAVACHOL) 40 MG tablet Take 40 mg by mouth daily. 9/13/2024 at PM    probiotic CAPS Take 1 capsule by mouth daily (Zenwise- pre-and pro-biotic) 9/13/2024 at PM     "

## 2024-09-14 NOTE — ANESTHESIA PREPROCEDURE EVALUATION
Anesthesia Pre-Procedure Evaluation    Patient: Cam Hernandez   MRN: [id number] : 1950        Procedure : Procedure(s):  INTERNAL FIXATION, FRACTURE, TROCHANTERIC, HIP, USING PINS OR RODS          History reviewed. No pertinent past medical history.   History reviewed. No pertinent surgical history.   Allergies   Allergen Reactions     Ammonium Lac Itching     Legs very red, excessive itching     Amoxicillin Nausea     Other reaction(s): nausea     Erythromycin Nausea     Naprosyn [Naproxen] Unknown      Social History     Tobacco Use     Smoking status: Former     Types: Cigars     Quit date:      Years since quittin.7     Smokeless tobacco: Never   Substance Use Topics     Alcohol use: Not on file      Wt Readings from Last 1 Encounters:   24 120.2 kg (265 lb)        Anesthesia Evaluation   Pt has had prior anesthetic.     No history of anesthetic complications       ROS/MED HX  ENT/Pulmonary:     (+)                tobacco use, Past use,                       Neurologic:       Cardiovascular: Comment: Stasis dermatitis and moderate edema of bilateral LE's to mid lower leg.   (-) CHF   METS/Exercise Tolerance:     Hematologic:  - neg hematologic  ROS     Musculoskeletal:   (+)  arthritis,             GI/Hepatic:       Renal/Genitourinary:       Endo: Comment: Hypocalcemia.    (+)  type II DM,   Using insulin,          Obesity,       Psychiatric/Substance Use:  - neg psychiatric ROS     Infectious Disease:  - neg infectious disease ROS     Malignancy:  - neg malignancy ROS     Other:  - neg other ROS          Physical Exam    Airway      Comment: Very long full beard.    Mallampati: II   TM distance: > 3 FB   Neck ROM: full   Mouth opening: > 3 cm    Respiratory Devices and Support         Dental       (+) Edentulous and Removable bridges or other hardware      Cardiovascular   cardiovascular exam normal          Pulmonary   pulmonary exam normal            OUTSIDE LABS:  CBC:   Lab Results  "  Component Value Date    WBC 14.7 (H) 09/14/2024    HGB 14.6 09/14/2024    HGB 14.6 09/22/2021    HCT 44.4 09/14/2024     09/14/2024     BMP:   Lab Results   Component Value Date     09/14/2024     05/03/2024    POTASSIUM 4.2 09/14/2024    POTASSIUM 4.4 05/03/2024    CHLORIDE 106 09/14/2024    CHLORIDE 106 05/03/2024    CO2 27 09/14/2024    CO2 25 05/03/2024    BUN 22.6 09/14/2024    BUN 19.1 05/03/2024    CR 1.17 09/14/2024    CR 1.22 (H) 05/03/2024     (H) 09/14/2024     (H) 05/03/2024     COAGS: No results found for: \"PTT\", \"INR\", \"FIBR\"  POC: No results found for: \"BGM\", \"HCG\", \"HCGS\"  HEPATIC:   Lab Results   Component Value Date    ALBUMIN 3.8 05/03/2024    PROTTOTAL 6.3 (L) 05/03/2024    ALT 10 05/03/2024    AST 18 06/12/2024    ALKPHOS 109 05/03/2024    BILITOTAL 0.5 05/03/2024     OTHER:   Lab Results   Component Value Date    A1C 6.1 (H) 09/14/2024    ETHEL 8.7 (L) 09/14/2024       Anesthesia Plan    ASA Status:  2    NPO Status:  NPO Appropriate    Anesthesia Type: General.     - Airway: ETT   Induction: Intravenous, Propofol.   Maintenance: Balanced.        Consents    Anesthesia Plan(s) and associated risks, benefits, and realistic alternatives discussed. Questions answered and patient/representative(s) expressed understanding.     - Discussed:     - Discussed with:  Patient      - Extended Intubation/Ventilatory Support Discussed: No.      - Patient is DNR/DNI Status: No     Use of blood products discussed: Yes.     - Discussed with: Patient.     - Consented: consented to blood products     Postoperative Care    Pain management: IV analgesics, Peripheral nerve block (Single Shot), Oral pain medications, Multi-modal analgesia.   PONV prophylaxis: Ondansetron (or other 5HT-3), Dexamethasone or Solumedrol     Comments:    Other Comments: Decadron, Zofran.  Diprivan infusion.  Toradol, Tylenol.  Ketamine (0.5 mg/kg).    FI block for POP per surgeon request.  Patient prefers " "GA for procedure.           Alvaro Crowley MD    I have reviewed the pertinent notes and labs in the chart from the past 30 days and (re)examined the patient.  Any updates or changes from those notes are reflected in this note.             # Drug Induced Platelet Defect: home medication list includes an antiplatelet medication  # Obesity: Estimated body mass index is 34.96 kg/m  as calculated from the following:    Height as of this encounter: 1.854 m (6' 1\").    Weight as of this encounter: 120.2 kg (265 lb).      "

## 2024-09-14 NOTE — ED NOTES
"[facility] ED Handoff Report    ED Chief Complaint: hip fracture    ED Diagnosis:  (S72.001A) Hip fracture, right, closed, initial encounter (H)  (primary encounter diagnosis)  Comment: Pain currently controlled  Plan: Case Request: INTERNAL FIXATION, FRACTURE,         TROCHANTERIC, HIP, USING PINS OR RODS            (W19.XXXA) Fall, initial encounter  Comment:   Plan: surgery    (M25.551) Right hip pain  Comment: Pain controlled with dilaudid  Plan: Continue to monitr       PMH:  History reviewed. No pertinent past medical history.     Code Status:  Full Code     Falls Risk: Yes Band: Applied    Current Living Situation/Residence: lives with their son or daughter     Elimination Status: Continent: Yes     Activity Level: Per baseline at home prior to hip fracture; used a cane or scooter    Patients Preferred Language:  English     Needed: No    Vital Signs:  BP (!) 157/68   Pulse 93   Temp 98.1  F (36.7  C) (Oral)   Resp 21   Ht 1.854 m (6' 1\")   Wt 120.2 kg (265 lb)   SpO2 99%   BMI 34.96 kg/m       Cardiac Rhythm:     Pain Score: 0/10    Is the Patient Confused:  No    Last Food or Drink: 9/13/24 prior to midnight    Focused Assessment:  Ortho    Tests Performed: Done: Labs and Imaging    Treatments Provided:  medication for pain control    Family Dynamics/Concerns: No    Family Updated On Visitor Policy: Yes    Plan of Care Communicated to Family: Yes    Who Was Updated about Plan of Care: patient    Belongings Checklist Done and Signed by Patient: Yes    Belongings Sent with Patient: yes    Medications sent with patient:     Covid: asymptomatic , not tested    Additional Information: patient currently wearing insulin pump; last glucose reading per phone: 131    RN: Che Sanchez RN   9/14/2024 11:37 AM       "

## 2024-09-14 NOTE — ED TRIAGE NOTES
Presents via [facility] from home where he slipped and fell in the kitchen. Denies LOC, thinners, hitting head and neck pain. Endorses right hip pain. Per EMS patient was able to stand with some pain to that area. Rating pain 4/10 on arrival. Patient is diabetic.

## 2024-09-14 NOTE — CONSULTS
Orthopedic Surgery consult note:  Cam is a 74-year-old male with a medical history notable for type 1 diabetes that presents for further evaluation of right leg pain after a ground-level fall.  Imaging in the emergency department inserted displaced intertrochanteric femur fracture.  Orthopedics was consulted for further management and treatment.  Currently Cam reports pain to the right hip with movement.  It is not painful when he is just laying still.  He denies any current numbness, ting or weakness within his right lower extremity.        Past medical history: Type 1 diabetes, denies history DVT, PE, MI, CAD, CVA   Past surgical history: Unremarkable  Family history: Unremarkable  Social history: Lives in [place] with his son    Medications and allergies reviewed        Physical examination:  74-year-old male in no acute distress      Focused examination of the right lower extremity and straits pitting edema distally without any open sores or wounds.  The right leg is held in a shortened and externally rotated position.  There is pain with attempted logroll.  Palpable DP pulse.  Neurovascularly intact firing gastrosoleus complex, EHL, and tibialis anterior        Imagin views of the right hip demonstrate a displaced intertrochanteric femur fracture    Labs:  H&H: 14.6&44.4  Cr: 1.17      Assessment: Cam is a 74-year-old male with a displaced right intertrochanteric femur fracture that will require operative fixation        Preoperatively, the nature of the procedure, risks and benefits, as well as alternatives including nonsurgical management were discussed in detail with the patient. I reviewed and discussed the patient's condition and relevant images with the patient. We discussed options for further evaluation and treatment, including conservative non-operative management versus surgical intervention.      This is an unstable fracture pattern for which there is no role for conservative  management. From a surgical standpoint, we discussed cephalomedullary nail fixation. We also discussed at length the risks and benefits of fracture fixation surgery. Our discussion included but was not limited to the risk of pain, bleeding, infection, blood clots (DVT, PE), wound issues, continued chronic pain in the hip, post-operative joint stiffness, painful arc of motion, difficulty with ambulation, iatrogenic fracture, damage to nearby neurovascular structures, and/or failure requiring revision. The possibility of intra-operative and/or post-operative medical complications such as anesthesia complications or reactions, respiratory and cardiovascular events, stroke, heart attack and/or death were also discussed.     The patient demonstrated an understanding of these risks as well as the potential benefits of fracture fixation surgery which would include possible improvement in pain, range of motion, and early ambulation. The patient demonstrated an understanding of the indications of surgery and all possible complications, and together we have decided to proceed with surgery. Specific details of the surgical procedure, hospitalization, recovery, rehabilitation, and long-term precautions were also presented. The final choices will be made at the time of procedure to match the anatomy and condition of the bone, ligaments, tendons, and muscles. All of patients questions were answered preoperatively at which time informed consent was taken.        Plan  OR for CMN R hip    Son Talavera MD  [facility]

## 2024-09-14 NOTE — OP NOTE
Operative Report    PATIENT Cam Hernandez   DATE OF SURGERY:  9/14/2024      PREOPERATIVE DIAGNOSIS   Hip fracture, right, closed, initial encounter (H) [S72.001A].    POSTOPERATIVE DIAGNOSIS   Hip fracture, right, closed, initial encounter (H) [S72.001A].    PROCEDURE PERFORMED   right hip trochanteric nailing with cephalomedullary device    IMPLANTS  Implant Name Type Inv. Item Serial No.  Lot No. LRB No. Used Action   11MM/130 DEG TI OUMOU TFNA 235 MM RIGHT NAIL   NA  [id number] Right 1 Implanted   IMP SCR SYN TFNA FENESTRATED LAG 115MM [id number] - [employer] IMP SCR SYN TFNA FENESTRATED LAG 115MM [id number] NA [employer] [id number] Right 1 Implanted   SCREW BN 38MM 5MM LCK X25 IM NL [id number] - [employer] SCREW BN 38MM 5MM LCK X25 IM NL [id number] NA [employer] [id number] Right 1 Implanted       SURGEON  Son Talavera MD      ANESTHESIA  General with Block      FINDINGS:  Right high-energy displaced intertrochanteric femur fracture to    SPECIMENS:  none    ESTIMATED BLOOD LOSS:  250 cc    COMPLICATIONS   None.        INDICATION FOR PROCEDURE  Cam Hernandez is a 74 year old male who sustained a right intertrochanteric hip fracture after a mechanical fall.  This is an unstable fracture pattern requiring operative intervention.  The risks, benefits and expected outcomes of the procedure were discussed with the patient.  Questions were answered fully.  The patient signed informed consent prior to the procedure.      INFORMED CONSENT  Cam Hernandez was identified in the preoperative holding area and was identified using medical record number, name, and date of birth, all of which were confirmed. The operative hip was marked using an indelible marker. Once again, all risks and benefits as well as alternatives to surgical intervention were discussed with the patient in detail and all their questions were answered. Risks discussed included but were not limited  to: persistent pain, infection, bleeding, scarring, stiffness, thromboembolic events, fracture, malalignment/malrotation, malunion/nonunion, implant complications, severe limb dysfunction, loss of limb, and loss of life. The patient signed informed consent and wished to proceed with surgery as scheduled.     DESCRIPTION OF PROCEDURE   Cam Hernandez was brought back to the operating room.  General anesthesia was achieved without difficulty.  The patient was then transferred to the fracture table.  Arms were tucked, and the nonoperative leg was placed .  All bony prominences were well-padded.  The operative leg was placed in the traction boot.  Fluoroscopy was used.  For the preoperative film.  It was apparent that this was a high-energy intertrochanteric femur fracture.  Therefore I knew that this would not lined up with straight traction and rotation.  My goal therefore prior to prepping and draping was to get enough length as to where when I would be able to compress and pull the neck fragment down I would not pull into significant varus.  I was able to obtain this with significant traction as well as internal rotation.  Once I had this completed we went ahead and prepped and draped in the standard fashion.    A timeout was performed prior to the procedure.  Three separate staff members confirmed the patient's name, correct site and side of surgery and procedure being performed.  Antibiotics were confirmed to be given prior to incision.  Implants were confirmed to be available and ready.    C-arm was used to guide an incision proximally above the tip of the trochanter.  I split the gluteal fascia and musculature and palpated the starting point for the guide pin and placed the guidepin.  Its position was confirmed on C-arm.  I then used the starting reamer to open the hole for the nail in the tip of the trochanter. He had quite a tight distal canal therefore I inserted a ball-tipped guidewire and reamed to a 12 5  with adequate chatter.  Following this I passed the intermediate nail.  Once I had this adequately seated I wanted to ensure I could get a good reduction.  Therefore I went ahead and passed my guidewire in a center center position.  He had quite notable bone quality and therefore I did not want to spin the head or cause any rotational abnormality given that you are already high-energy nature of this fracture.  Given this I placed 2 derotational wires anterior and posterior.  Once I had this completed I drilled for my lag screw.  There was significant gapping at the fracture site therefore I had to guess how long I want to my leg screw to be as it measured 125 which I knew would not be the necessary length.  Given this I went ahead and selected 115 mm lag screw.  This was inserted and countersunk 3-1/2 mm.  Following this I removed the derotational wires.  I maintain traction as I knew this was a extremely unstable fracture.  I went ahead and compressed with traction on.  I was able to get the neck shaft angle to approximate 125 degrees.  I did think this was in a little bit of varus but I did not feel I could correct this given the significant deforming forces on the femur.  Given this I went ahead and statically locked this when I had adequate compression and alignment on the lateral and AP films.  Once this was completed I let off traction and confirmed my reduction.  This was acceptable.  Following this we went ahead and placed the distal interlocking screw according to manufactures technique guide.  Once this was completed the wounds were copiously irrigated and final fluoroscopic films were obtained.  The  incisions were then closed in a layered fashion.  The proximal incision was closed with 0 Vicryl in the deep fascia followed by 2-0 Vicryl in the deep dermis and 2-0 nylon sutures in the skin.  The distal interlocking screw, as well as the lag screw and percutaneous derotational wires were closed with 2-0  Vicryl in the deep dermis and 2-0 nylon in the skin.  Xeroform, 4 x 4's, Tegaderms were applied to the wounds.  The patient was transferred to the hospital bed in stable condition.      POSTOPERATIVE EXAM:    Resting comfortably in recovery    Fires gastrosoleus complex, EHL, tibialis anterior  Sensation intact to light touch in all distributions  Palpable pulse      POSTOPERATIVE PLAN:    Return to medicine   Foot flat weightbearing at 50% with a walker at all times for 6 weeks  Aspirin 81 mg twice daily for DVT prophylaxis  P.o. pain medication  Perioperative antibiotics  Imaging in the recovery area  General Care, will require follow-up with me in 2 weeks for a wound check      Son Talavera MD  [facility]

## 2024-09-15 ENCOUNTER — APPOINTMENT (OUTPATIENT)
Dept: PHYSICAL THERAPY | Facility: HOSPITAL | Age: 74
DRG: 481 | End: 2024-09-15
Attending: STUDENT IN AN ORGANIZED HEALTH CARE EDUCATION/TRAINING PROGRAM
Payer: COMMERCIAL

## 2024-09-15 ENCOUNTER — APPOINTMENT (OUTPATIENT)
Dept: ULTRASOUND IMAGING | Facility: HOSPITAL | Age: 74
DRG: 481 | End: 2024-09-15
Attending: INTERNAL MEDICINE
Payer: COMMERCIAL

## 2024-09-15 ENCOUNTER — APPOINTMENT (OUTPATIENT)
Dept: OCCUPATIONAL THERAPY | Facility: HOSPITAL | Age: 74
DRG: 481 | End: 2024-09-15
Attending: STUDENT IN AN ORGANIZED HEALTH CARE EDUCATION/TRAINING PROGRAM
Payer: COMMERCIAL

## 2024-09-15 LAB
ANION GAP SERPL CALCULATED.3IONS-SCNC: 12 MMOL/L (ref 7–15)
BUN SERPL-MCNC: 27.6 MG/DL (ref 8–23)
CALCIUM SERPL-MCNC: 8.6 MG/DL (ref 8.8–10.4)
CHLORIDE SERPL-SCNC: 101 MMOL/L (ref 98–107)
CREAT SERPL-MCNC: 1.39 MG/DL (ref 0.67–1.17)
EGFRCR SERPLBLD CKD-EPI 2021: 53 ML/MIN/1.73M2
ERYTHROCYTE [DISTWIDTH] IN BLOOD BY AUTOMATED COUNT: 13.3 % (ref 10–15)
GLUCOSE BLDC GLUCOMTR-MCNC: 126 MG/DL (ref 70–99)
GLUCOSE BLDC GLUCOMTR-MCNC: 138 MG/DL (ref 70–99)
GLUCOSE BLDC GLUCOMTR-MCNC: 170 MG/DL (ref 70–99)
GLUCOSE BLDC GLUCOMTR-MCNC: 183 MG/DL (ref 70–99)
GLUCOSE BLDC GLUCOMTR-MCNC: 184 MG/DL (ref 70–99)
GLUCOSE SERPL-MCNC: 200 MG/DL (ref 70–99)
HCO3 SERPL-SCNC: 23 MMOL/L (ref 22–29)
HCT VFR BLD AUTO: 40.4 % (ref 40–53)
HGB BLD-MCNC: 13.3 G/DL (ref 13.3–17.7)
MCH RBC QN AUTO: 30.2 PG (ref 26.5–33)
MCHC RBC AUTO-ENTMCNC: 32.9 G/DL (ref 31.5–36.5)
MCV RBC AUTO: 92 FL (ref 78–100)
PLATELET # BLD AUTO: 287 10E3/UL (ref 150–450)
POTASSIUM SERPL-SCNC: 4.6 MMOL/L (ref 3.4–5.3)
RBC # BLD AUTO: 4.41 10E6/UL (ref 4.4–5.9)
SODIUM SERPL-SCNC: 136 MMOL/L (ref 135–145)
WBC # BLD AUTO: 13.9 10E3/UL (ref 4–11)

## 2024-09-15 PROCEDURE — 250N000011 HC RX IP 250 OP 636: Mod: JZ | Performed by: STUDENT IN AN ORGANIZED HEALTH CARE EDUCATION/TRAINING PROGRAM

## 2024-09-15 PROCEDURE — 80048 BASIC METABOLIC PNL TOTAL CA: CPT | Performed by: STUDENT IN AN ORGANIZED HEALTH CARE EDUCATION/TRAINING PROGRAM

## 2024-09-15 PROCEDURE — 97530 THERAPEUTIC ACTIVITIES: CPT | Mod: GO

## 2024-09-15 PROCEDURE — 85027 COMPLETE CBC AUTOMATED: CPT | Performed by: STUDENT IN AN ORGANIZED HEALTH CARE EDUCATION/TRAINING PROGRAM

## 2024-09-15 PROCEDURE — 120N000001 HC R&B MED SURG/OB

## 2024-09-15 PROCEDURE — 97166 OT EVAL MOD COMPLEX 45 MIN: CPT | Mod: GO

## 2024-09-15 PROCEDURE — 99232 SBSQ HOSP IP/OBS MODERATE 35: CPT | Performed by: INTERNAL MEDICINE

## 2024-09-15 PROCEDURE — 250N000013 HC RX MED GY IP 250 OP 250 PS 637: Performed by: STUDENT IN AN ORGANIZED HEALTH CARE EDUCATION/TRAINING PROGRAM

## 2024-09-15 PROCEDURE — 97112 NEUROMUSCULAR REEDUCATION: CPT | Mod: GP

## 2024-09-15 PROCEDURE — 36415 COLL VENOUS BLD VENIPUNCTURE: CPT | Performed by: STUDENT IN AN ORGANIZED HEALTH CARE EDUCATION/TRAINING PROGRAM

## 2024-09-15 PROCEDURE — 93970 EXTREMITY STUDY: CPT

## 2024-09-15 PROCEDURE — 258N000003 HC RX IP 258 OP 636: Performed by: INTERNAL MEDICINE

## 2024-09-15 PROCEDURE — 97161 PT EVAL LOW COMPLEX 20 MIN: CPT | Mod: GP

## 2024-09-15 PROCEDURE — 97530 THERAPEUTIC ACTIVITIES: CPT | Mod: GP

## 2024-09-15 RX ADMIN — SENNOSIDES AND DOCUSATE SODIUM 1 TABLET: 8.6; 5 TABLET ORAL at 20:52

## 2024-09-15 RX ADMIN — ACETAMINOPHEN 975 MG: 325 TABLET ORAL at 20:50

## 2024-09-15 RX ADMIN — CEFAZOLIN SODIUM 2 G: 2 INJECTION, SOLUTION INTRAVENOUS at 04:59

## 2024-09-15 RX ADMIN — POLYETHYLENE GLYCOL 3350 17 G: 17 POWDER, FOR SOLUTION ORAL at 08:40

## 2024-09-15 RX ADMIN — ASPIRIN 81 MG: 81 TABLET, COATED ORAL at 08:40

## 2024-09-15 RX ADMIN — OXYCODONE HYDROCHLORIDE 5 MG: 5 TABLET ORAL at 13:03

## 2024-09-15 RX ADMIN — SENNOSIDES AND DOCUSATE SODIUM 1 TABLET: 8.6; 5 TABLET ORAL at 08:40

## 2024-09-15 RX ADMIN — OXYCODONE HYDROCHLORIDE 10 MG: 5 TABLET ORAL at 17:25

## 2024-09-15 RX ADMIN — SODIUM CHLORIDE 1000 ML: 9 INJECTION, SOLUTION INTRAVENOUS at 14:11

## 2024-09-15 RX ADMIN — ASPIRIN 81 MG: 81 TABLET, COATED ORAL at 20:52

## 2024-09-15 ASSESSMENT — ACTIVITIES OF DAILY LIVING (ADL)
ADLS_ACUITY_SCORE: 36
ADLS_ACUITY_SCORE: 26
ADLS_ACUITY_SCORE: 26
ADLS_ACUITY_SCORE: 36
ADLS_ACUITY_SCORE: 24
ADLS_ACUITY_SCORE: 36
ADLS_ACUITY_SCORE: 24
ADLS_ACUITY_SCORE: 26
ADLS_ACUITY_SCORE: 36
ADLS_ACUITY_SCORE: 26
ADLS_ACUITY_SCORE: 32
ADLS_ACUITY_SCORE: 26
ADLS_ACUITY_SCORE: 26
ADLS_ACUITY_SCORE: 36
ADLS_ACUITY_SCORE: 26
ADLS_ACUITY_SCORE: 24
ADLS_ACUITY_SCORE: 36
ADLS_ACUITY_SCORE: 26
ADLS_ACUITY_SCORE: 26

## 2024-09-15 NOTE — OR NURSING
This RN cared for patient in PACU. Upon patient leaving the PACU patient had his insulin pump and continuous glucose monitor glucometer on his chest.   RN was contacted by OR control stating that P4 had called down stating that he did not arrive with a glucometer. P4 is insistent that it did not arrive with the patient. This RN went up to P4, lifted his gown and found the glucometer on his person, in his lap. RN showeed the patient. RN then walked the glucometer to the charge desk. RN showed the nursing supervisor and the patient's assigned RN. This RN left the glucometer in the hand of the primary RN at 1935.     BOTH the glucometer and the insulin pump were returned, as stated, with the patient.

## 2024-09-15 NOTE — PLAN OF CARE
"Pt A&Ox4, calm, cooperative, talkative, pleasant  - vitals stable  - IV antibiotics overnight  - denies pain or nausea  - right hip incision: dried drainage   - PCDs on, cold pack applied  - void trial; bladder scanned         - 452 after voiding 50 mL, 460 after 200 mL, 410 after 250mL          Problem: Mobility Impairment  Goal: Optimal Mobility  Outcome: Not Progressing  Intervention: Optimize Mobility  Recent Flowsheet Documentation  Taken 9/15/2024 0000 by Keeley Farrell, RN  Activity Management: bedrest  Positioning/Transfer Devices:   pillows   in use     Problem: Surgery Nonspecified  Goal: Effective Urinary Elimination  Outcome: Not Progressing  Intervention: Monitor and Manage Urinary Retention  Recent Flowsheet Documentation  Taken 9/15/2024 0000 by Keeley Farrell, RN  Urinary Elimination Promotion:   toileting offered   frequent voiding encouraged   voiding relaxation promoted   bladder volume assessed by ultrasound   Goal Outcome Evaluation:      Plan of Care Reviewed With: patient    Overall Patient Progress: improvingOverall Patient Progress: improving           Problem: Adult Inpatient Plan of Care  Goal: Plan of Care Review  Description: The Plan of Care Review/Shift note should be completed every shift.  The Outcome Evaluation is a brief statement about your assessment that the patient is improving, declining, or no change.  This information will be displayed automatically on your shift  note.  Outcome: Progressing  Flowsheets (Taken 9/15/2024 0540)  Plan of Care Reviewed With: patient  Overall Patient Progress: improving  Goal: Patient-Specific Goal (Individualized)  Description: You can add care plan individualizations to a care plan. Examples of Individualization might be:  \"Parent requests to be called daily at 9am for status\", \"I have a hard time hearing out of my right ear\", or \"Do not touch me to wake me up as it startles  me\".  Outcome: Progressing  Goal: Absence of Hospital-Acquired " Illness or Injury  Outcome: Progressing  Intervention: Identify and Manage Fall Risk  Recent Flowsheet Documentation  Taken 9/15/2024 0000 by Keeley Farrell RN  Safety Promotion/Fall Prevention:   assistive device/personal items within reach   patient and family education   safety round/check completed   room near nurse's station  Intervention: Prevent Skin Injury  Recent Flowsheet Documentation  Taken 9/15/2024 0000 by Keeley Farrell RN  Body Position: supine, head elevated  Skin Protection:   incontinence pads utilized   adhesive use limited  Device Skin Pressure Protection:   positioning supports utilized   absorbent pad utilized/changed   adhesive use limited   pressure points protected  Intervention: Prevent and Manage VTE (Venous Thromboembolism) Risk  Recent Flowsheet Documentation  Taken 9/15/2024 0000 by Keeley Farrell RN  VTE Prevention/Management: SCDs on (sequential compression devices)  Intervention: Prevent Infection  Recent Flowsheet Documentation  Taken 9/15/2024 0000 by Keeley Farrell RN  Infection Prevention:   rest/sleep promoted   single patient room provided   hand hygiene promoted   equipment surfaces disinfected  Goal: Optimal Comfort and Wellbeing  Outcome: Progressing  Intervention: Monitor Pain and Promote Comfort  Recent Flowsheet Documentation  Taken 9/15/2024 0000 by Keeley Farrell RN  Pain Management Interventions: declines  Goal: Readiness for Transition of Care  Outcome: Progressing     Problem: Risk for Delirium  Goal: Optimal Coping  Outcome: Progressing  Intervention: Optimize Psychosocial Adjustment to Delirium  Recent Flowsheet Documentation  Taken 9/15/2024 0000 by Keeley Farrell RN  Supportive Measures:   active listening utilized   verbalization of feelings encouraged   positive reinforcement provided  Goal: Improved Behavioral Control  Outcome: Progressing  Intervention: Prevent and Manage Agitation  Recent Flowsheet Documentation  Taken 9/15/2024 0000  by Keeley Farrell RN  Environment Familiarity/Consistency: daily routine followed  Intervention: Minimize Safety Risk  Recent Flowsheet Documentation  Taken 9/15/2024 0000 by Keeley Farrell RN  Enhanced Safety Measures:   assistive devices when indicated   pain management   review medications for side effects with activity   room near unit station  Goal: Improved Attention and Thought Clarity  Outcome: Progressing  Goal: Improved Sleep  Outcome: Progressing     Problem: Surgery Nonspecified  Goal: Absence of Bleeding  Outcome: Progressing  Intervention: Monitor and Manage Bleeding  Recent Flowsheet Documentation  Taken 9/15/2024 0000 by Keeley Farrell RN  Bleeding Management: dressing monitored  Goal: Effective Bowel Elimination  Outcome: Progressing  Goal: Fluid and Electrolyte Balance  Outcome: Progressing  Intervention: Monitor and Manage Fluid and Electrolyte Balance  Recent Flowsheet Documentation  Taken 9/15/2024 0000 by Keeley Farrell RN  Fluid/Electrolyte Management: fluids provided  Goal: Blood Glucose Level Within Targeted Range  Outcome: Progressing  Intervention: Optimize Glycemic Control  Recent Flowsheet Documentation  Taken 9/15/2024 0000 by Keeley Farrell RN  Glycemic Management: (pt has insulin implant) blood glucose monitored  Goal: Absence of Infection Signs and Symptoms  Outcome: Progressing  Goal: Anesthesia/Sedation Recovery  Outcome: Progressing  Intervention: Optimize Anesthesia Recovery  Recent Flowsheet Documentation  Taken 9/15/2024 0500 by Keeley Farrell RN  Administration (IS):   proper technique demonstrated   self-administered  Level Incentive Spirometer (mL): 1300  Incentive Spirometer Predicted Level (mL): 2700  Number of Repetitions (IS): 4  Patient Tolerance (IS): good  Taken 9/15/2024 0000 by Keeley Farrell RN  Safety Promotion/Fall Prevention:   assistive device/personal items within reach   patient and family education   safety round/check completed    room near nurse's station  Administration (IS):   instruction provided, follow-up   self-administered   proper technique demonstrated  Level Incentive Spirometer (mL): 1200  Incentive Spirometer Predicted Level (mL): 2700  Number of Repetitions (IS): 2  Patient Tolerance (IS): good  Goal: Optimal Pain Control and Function  Outcome: Progressing  Intervention: Prevent or Manage Pain  Recent Flowsheet Documentation  Taken 9/15/2024 0000 by Keeley Farrell RN  Pain Management Interventions: declines  Goal: Nausea and Vomiting Relief  Outcome: Progressing  Goal: Effective Oxygenation and Ventilation  Outcome: Progressing  Intervention: Optimize Oxygenation and Ventilation  Recent Flowsheet Documentation  Taken 9/15/2024 0500 by Keeley Farrell RN  Airway/Ventilation Management: pulmonary hygiene promoted  Taken 9/15/2024 0000 by Keeley Farrell RN  Airway/Ventilation Management: pulmonary hygiene promoted  Head of Bed (HOB) Positioning: HOB at 30-45 degrees     Problem: Comorbidity Management  Goal: Blood Glucose Levels Within Targeted Range  Outcome: Progressing  Intervention: Monitor and Manage Glycemia  Recent Flowsheet Documentation  Taken 9/15/2024 0000 by Keeley Farrell RN  Glycemic Management: (pt has insulin implant) blood glucose monitored  Medication Review/Management: medications reviewed

## 2024-09-15 NOTE — PROGRESS NOTES
OT [place]     09/15/24 1300   Appointment Info   Signing Clinician's Name / Credentials (OT) Fay Frey, OTR/L   Rehab Comments (OT) At this time, co-treat w/ PT, pt may progress to be seen separately by OT/PT   Living Environment   People in Home child(sade), adult   Current Living Arrangements house   Home Accessibility no concerns   Living Environment Comments Lives w/ adult son, unclear how often son is home, pt states son can provide A when he is home. Ramp to enter, one-level home. Tub-shower, built in seat in tub, GBs in BR.   Self-Care   Current Activity Tolerance poor   Equipment Currently Used at Home cane, straight;other (see comments)   Fall history within last six months yes   Activity/Exercise/Self-Care Comment No AD in home, cane in community. IND ADLs at BL   Instrumental Activities of Daily Living (IADL)   IADL Comments Shared responsibilities w son, unclear details.   General Information   Onset of Illness/Injury or Date of Surgery 09/14/24   Referring Physician Son Talavera MD   Additional Occupational Profile Info/Pertinent History of Current Problem Cam Hernandez is a 74 year old male with a pertinent history of T1DM, diabetic neuropathy, and HLD who presents to this ED by EMS for evaluation of right hip pain.   Existing Precautions/Restrictions fall   Right Lower Extremity (Weight-bearing Status) partial weight-bearing (PWB);other (see comments)   General Observations and Info RLE 50% flat-foot WB w walker   Cognitive Status Examination   Orientation Status orientation to person, place and time   Affect/Mental Status (Cognitive) WNL   Sensory   Sensory Quick Adds sensation intact   Range of Motion Comprehensive   General Range of Motion other (see comments)   Comment, General Range of Motion UB ROM WFL, Pt reporting increased weakness LUE. Decreased BLE ROM   Strength Comprehensive (MMT)   Comment, General Manual Muscle Testing (MMT) Assessment Generalized weakness   Muscle Tone Assessment    Muscle Tone Quick Adds No deficits were identified   Coordination   Upper Extremity Coordination No deficits were identified   Bed Mobility   Bed Mobility sit-supine;supine-sit   Supine-Sit [place] (Bed Mobility) maximum assist (25% patient effort);2 person assist   Sit-Supine [place] (Bed Mobility) maximum assist (25% patient effort);2 person assist   Comment (Bed Mobility) Max Ax2-3, extra time needed, HOB raised to the max, use of draw sheet.   Transfers   Transfers sit-stand transfer   Sit-Stand Transfer   Sit/Stand Transfer Comments Pt unable to come to stand today w/ raised bed, Max Ax2, FWW   Balance   Balance Assessment sitting static balance   Sitting Balance: Static minimal assist   Position, Sitting Balance sitting edge of bed   Balance Comments Pt tending to lean back, having difficulty balancing   Activities of Daily Living   BADL Assessment/Intervention toileting   Toileting   Comment, (Toileting) Per clinical reasoning, at this time, pt would need bed pan /urinal for toileting. Unable to stand /complete OOB activity at this time.   Clinical Impression   Criteria for Skilled Therapeutic Interventions Met (OT) Yes, treatment indicated   OT Diagnosis Impaired ADLs, IADLs, mobility, transfers, bed mobility   Influenced by the following impairments Pain, deconditioning, WB restrictions   OT Problem List-Impairments impacting ADL problems related to;activity tolerance impaired;balance;mobility;range of motion (ROM);strength;pain;post-surgical precautions   Assessment of Occupational Performance 5 or more Performance Deficits   Identified Performance Deficits Bed mobility, transfers, mobility, ADL, IADL   Planned Therapy Interventions (OT) ADL retraining;IADL retraining;balance training;bed mobility training;strengthening;ROM;transfer training;home program guidelines;progressive activity/exercise   Clinical Decision Making Complexity (OT) detailed assessment/moderate complexity   Risk & Benefits  of therapy have been explained evaluation/treatment results reviewed;care plan/treatment goals reviewed;patient   OT Total Evaluation Time   OT Eval, Moderate Complexity Minutes (90974) 18   OT Goals   Therapy Frequency (OT) 5 times/week   OT Predicted Duration/Target Date for Goal Attainment 09/22/24   OT Goals Toilet Transfer/Toileting;Lower Body Dressing   OT: Lower Body Dressing Minimal assist;within precautions;using adaptive equipment   OT: Toilet Transfer/Toileting Minimal assist;using adaptive equipment;within precautions   Interventions   Interventions Quick Adds Therapeutic Activity   Therapeutic Activities   Therapeutic Activity Minutes (22973) 29   Symptoms noted during/after treatment fatigue;increased pain   Treatment Detail/Skilled Intervention Eval complete, treatment indicated and initiated. Pt sat EOB w/ Min A-SBA support ~20 min total. Initially Min A to support UB as pt tending to lean back, Mod VCs for hand placement/posture, pt progressing to CGA-SBA support while sitting EOB. Education on progressing POC as pt reporting frustration w/ impaired function/mobility today, motivation/encouragement provided. STS trial Max Ax2, pt unable to lift self off bed. OT cueing for trialing weight-shifting while sitting EOB, facilitating pt to trial scooting self laterally across EOB, pt unable to move self measureable distance. Further education on WB precautions, body ergonomics for pain management. Extra time spent setting up bed (lengthening bed to aid in bed mobility). Session ended Max Ax2 sit>supine, Dep to reposition. Cues for deep breathing throughout session per pain management. Alarm on, call button near, pt supine at end.   OT Discharge Planning   OT Plan Co-treat, progress bed mobility, STS, progres to functional transfers. Could administer therabands for pt to have in room as he requested UB strengthening today.   OT Discharge Recommendation (DC Rec) Transitional Care Facility   OT Rationale for  DC Rec Pt unable to stand /tolerate OOB activity today, Max Ax2, unsafe to go home.   OT Brief overview of current status Max Ax2-3 bed mobility   Total Session Time   Timed Code Treatment Minutes 29   Total Session Time (sum of timed and untimed services) 47

## 2024-09-15 NOTE — PROGRESS NOTES
"[facility] Orthopedic Post-Op Note         Assessment and Plan:      Assessment:   Post-operative Day: 1 Day Post-Op  S/P Procedure(s):  INTERNAL FIXATION, FRACTURE, TROCHANTERIC, HIP, USING NAIL         Plan:     --Continue PT/OT  --Weightbearing status: Flat Foot WB at %50 with walker at all times x 6 weeks  --Anticoagulation: ASA 81mg BID in addition to SCDs, chris stockings and early ambulation.  --Discharge planning: Transitional Care Unit.  --Follow-up with Dr. Talavera's team in 2 weeks for wound check.             Interval History:     On visitation today patient is resting comfortably in bed ordering breakfast.  Doing well.  Still has some numbness in the right upper leg from the block.  Pain is minimal.                  Physical Exam:   Vitals were reviewed  Temp: 98.5  F (36.9  C) Temp src: Oral BP: (!) 155/70 Pulse: 95   Resp: 18 SpO2: 95 % O2 Device: None (Room air) Oxygen Delivery: 2 LPM    Gen: A&O x 3. NAD.   Wound status: Gauze dressings in place with evidence of old bleeding especially of the proximal incision.  Dressings still intact  Intact dorsiflexion/plantarflexion bilaterally but stiffness at baseline  Swelling: mild  Calf tenderness: calves are soft and non-tender bilaterally          Data:     Hemoglobin   Date Value Ref Range Status   09/15/2024 13.3 13.3 - 17.7 g/dL Final   09/14/2024 14.6 13.3 - 17.7 g/dL Final   09/22/2021 14.6 13.3 - 17.7 g/dL Final     No results found for: \"INR\"     Lab Results   Component Value Date    WBC 13.9 (H) 09/15/2024    WBC 14.7 (H) 09/14/2024    HGB 13.3 09/15/2024    HGB 14.6 09/14/2024    HGB 14.6 09/22/2021    HCT 40.4 09/15/2024    HCT 44.4 09/14/2024    MCV 92 09/15/2024    MCV 91 09/14/2024     09/15/2024     09/14/2024            Medications:     Current Facility-Administered Medications   Medication Dose Route Frequency Provider Last Rate Last Admin    [START ON 9/17/2024] acetaminophen (TYLENOL) tablet 650 mg  " 650 mg Oral Q4H PRN Son Talavera MD        acetaminophen (TYLENOL) tablet 975 mg  975 mg Oral Q8H Son Talavera MD        [Held by provider] aspirin EC tablet 81 mg  81 mg Oral Daily Duong Moreno MD        aspirin EC tablet 81 mg  81 mg Oral BID Son Talavera MD   81 mg at 09/14/24 2203    benzocaine-menthol (CHLORASEPTIC) 6-10 MG lozenge 1 lozenge  1 lozenge Buccal Q1H PRN Son Talavera MD        [START ON 9/17/2024] bisacodyl (DULCOLAX) suppository 10 mg  10 mg Rectal Daily PRN Son Talavera MD        calcium carbonate (TUMS) chewable tablet 1,000 mg  1,000 mg Oral 4x Daily PRN Son Talavera MD        glucose gel 15-30 g  15-30 g Oral Q15 Min PRN Son Talavera MD        Or    dextrose 50 % injection 25-50 mL  25-50 mL Intravenous Q15 Min PRN Son Talavera MD        Or    glucagon injection 1 mg  1 mg Subcutaneous Q15 Min PRN Son Talavera MD        HYDROmorphone (DILAUDID) injection 0.2 mg  0.2 mg Intravenous Q2H PRN Son Talavera MD        Or    HYDROmorphone (DILAUDID) injection 0.4 mg  0.4 mg Intravenous Q2H PRN Son Talavera MD        insulin basal rate from AMBULATORY PUMP   Subcutaneous Continuous Son Talavera MD        insulin bolus from AMBULATORY PUMP   Subcutaneous 4x Daily AC & HS Son Talavera MD   Given at 09/14/24 2157    insulin lispro (HumaLOG) 100 UNIT/ML vial for filling pump reservoir   Device See Admin Instructions Son Talavera MD        lactated ringers infusion   Intravenous Continuous Son Talavera MD   Stopped at 09/15/24 0607    lidocaine (LMX4) cream   Topical Q1H PRN Son Talavera MD        lidocaine 1 % 0.1-1 mL  0.1-1 mL Other Q1H PRN Son Talavera MD        [START ON 9/16/2024] magnesium hydroxide (MILK OF MAGNESIA) suspension 30 mL  30 mL Oral Daily PRN Son Talavera MD        naloxone (NARCAN) injection 0.2 mg  0.2 mg Intravenous Q2 Min PRN Son Talavera MD        Or    naloxone (NARCAN) injection 0.4 mg  0.4 mg Intravenous Q2 Min PRN Son Talavera MD         Or    naloxone (NARCAN) injection 0.2 mg  0.2 mg Intramuscular Q2 Min PRN Son Talavera MD        Or    naloxone (NARCAN) injection 0.4 mg  0.4 mg Intramuscular Q2 Min PRN Son Talavera MD        ondansetron (ZOFRAN ODT) ODT tab 4 mg  4 mg Oral Q6H PRN Son Talavera MD        Or    ondansetron (ZOFRAN) injection 4 mg  4 mg Intravenous Q6H PRN Son Talavera MD        oxyCODONE (ROXICODONE) tablet 5 mg  5 mg Oral Q4H PRN Son Talavera MD        Or    oxyCODONE (ROXICODONE) tablet 10 mg  10 mg Oral Q4H PRN Son Talavera MD        polyethylene glycol (MIRALAX) Packet 17 g  17 g Oral Daily Son Talavera MD        prochlorperazine (COMPAZINE) injection 5 mg  5 mg Intravenous Q6H PRN Son Talavera MD        Or    prochlorperazine (COMPAZINE) tablet 5 mg  5 mg Oral Q6H PRN Son Talavera MD        senna-docusate (SENOKOT-S/PERICOLACE) 8.6-50 MG per tablet 1 tablet  1 tablet Oral BID PRN Son Talavera MD        Or    senna-docusate (SENOKOT-S/PERICOLACE) 8.6-50 MG per tablet 2 tablet  2 tablet Oral BID PRN Son Talavera MD        senna-docusate (SENOKOT-S/PERICOLACE) 8.6-50 MG per tablet 1 tablet  1 tablet Oral BID Son Talavera MD   1 tablet at 09/14/24 2154    sodium chloride (PF) 0.9% PF flush 3 mL  3 mL Intracatheter Q8H Son Talavera MD   3 mL at 09/15/24 0124    sodium chloride (PF) 0.9% PF flush 3 mL  3 mL Intracatheter q1 min prn Son Talavera MD Abby K. Bernstein, PA-C

## 2024-09-15 NOTE — PLAN OF CARE
Problem: Adult Inpatient Plan of Care  Goal: Absence of Hospital-Acquired Illness or Injury  Intervention: Identify and Manage Fall Risk  Recent Flowsheet Documentation  Taken 9/15/2024 1630 by Tere Cardona RN  Safety Promotion/Fall Prevention:   activity supervised   supervised activity  Intervention: Prevent and Manage VTE (Venous Thromboembolism) Risk  Recent Flowsheet Documentation  Taken 9/15/2024 1630 by Tere Cardona RN  VTE Prevention/Management: patient refused intervention  Intervention: Prevent Infection  Recent Flowsheet Documentation  Taken 9/15/2024 1630 by Tere Cardona RN  Infection Prevention: hand hygiene promoted     Problem: Surgery Nonspecified  Goal: Absence of Bleeding  Intervention: Monitor and Manage Bleeding  Recent Flowsheet Documentation  Taken 9/15/2024 1630 by Tere Cardona RN  Bleeding Management: dressing monitored  Goal: Fluid and Electrolyte Balance  Intervention: Monitor and Manage Fluid and Electrolyte Balance  Recent Flowsheet Documentation  Taken 9/15/2024 1630 by Tere Cardona RN  Fluid/Electrolyte Management: fluids provided  Goal: Blood Glucose Level Within Targeted Range  Intervention: Optimize Glycemic Control  Recent Flowsheet Documentation  Taken 9/15/2024 1630 by Tere Cardona RN  Glycemic Management: blood glucose monitored  Goal: Anesthesia/Sedation Recovery  Intervention: Optimize Anesthesia Recovery  Recent Flowsheet Documentation  Taken 9/15/2024 1630 by Tere Cardona RN  Safety Promotion/Fall Prevention:   activity supervised   supervised activity  Administration (IS):   proper technique demonstrated   self-administered  Incentive Spirometer Predicted Level (mL): 2700  Patient Tolerance (IS): good     Problem: Comorbidity Management  Goal: Blood Glucose Levels Within Targeted Range  Intervention: Monitor and Manage Glycemia  Recent Flowsheet Documentation  Taken 9/15/2024 1630 by Tere Cardona RN  Glycemic Management: blood glucose monitored  Medication  Review/Management: medications reviewed  Goal Outcome Evaluation:  Pt finished a NS IV bolus this shift - he also drank over 950 ml of water.  He was having a lot of hesitation while voiding, which took 1 hr using the urinal to produce 250 ml urine.  Bladder scanned for over 370 ml and straight cathed with moore with 550 out.  There was a large clot and a lot of resistance during cath insertion.  Updated Dr. Becker.  Moore to be left in per Dr. Becker.  Pt had pain with movement of LE, the left and right legs.  Oxycodone 10 mg effective.  Dressings intact.  Pt managed own insulin via pump.

## 2024-09-15 NOTE — PLAN OF CARE
Problem: Mobility Impairment  Goal: Optimal Mobility  Outcome: Not Progressing     Problem: Adult Inpatient Plan of Care  Goal: Optimal Comfort and Wellbeing  Outcome: Progressing  Problem: Surgery Nonspecified  Goal: Effective Urinary Elimination  Outcome: Progressing  Problem: Comorbidity Management  Goal: Blood Glucose Levels Within Targeted Range  Outcome: Progressing     AO, VSS on RA. Pt co Left calf (hamstring) pain this afternoon, 7/10 with PCDs on. PCD removed from LLE and PRN oxy 5mg given, with relief of pain. Pt states he may have pulled his Left hamstring prior to this admission. PVR BS >300mL, so straight cath'd @0900 for 600 mL clear law urine. Urinal appropriate. Pt self-manages his insulin via pump. Creatinine elevated this shift (1.39 from 1.17 yesterday), NS bolus currently running at 250mL/hr, to recheck Creatinine tomorrow AM. Right hip dressing with dried drainage.    Mart Rodriguez RN

## 2024-09-15 NOTE — PLAN OF CARE
"Problem: Adult Inpatient Plan of Care  Goal: Plan of Care Review  Description: The Plan of Care Review/Shift note should be completed every shift.  The Outcome Evaluation is a brief statement about your assessment that the patient is improving, declining, or no change.  This information will be displayed automatically on your shift  note.  Outcome: Progressing  Flowsheets (Taken 9/14/2024 2021)  Plan of Care Reviewed With: patient  Overall Patient Progress: no change  Goal: Patient-Specific Goal (Individualized)  Description: You can add care plan individualizations to a care plan. Examples of Individualization might be:  \"Parent requests to be called daily at 9am for status\", \"I have a hard time hearing out of my right ear\", or \"Do not touch me to wake me up as it startles  me\".  Outcome: Progressing  Goal: Absence of Hospital-Acquired Illness or Injury  Outcome: Progressing  Intervention: Identify and Manage Fall Risk  Recent Flowsheet Documentation  Taken 9/14/2024 1900 by Tere Cardona, RN  Safety Promotion/Fall Prevention:   activity supervised   supervised activity  Intervention: Prevent and Manage VTE (Venous Thromboembolism) Risk  Recent Flowsheet Documentation  Taken 9/14/2024 1900 by Tere Cardona, RN  VTE Prevention/Management: patient refused intervention  Goal: Optimal Comfort and Wellbeing  Outcome: Progressing  Goal: Readiness for Transition of Care  Outcome: Progressing     Problem: Risk for Delirium  Goal: Optimal Coping  Outcome: Progressing  Goal: Improved Behavioral Control  Outcome: Progressing  Goal: Improved Attention and Thought Clarity  Outcome: Progressing  Goal: Improved Sleep  Outcome: Progressing     Problem: Mobility Impairment  Goal: Optimal Mobility  9/14/2024 2021 by Tere Cardona, RN  Outcome: Progressing  9/14/2024 2020 by Tere Cardona, RN  Outcome: Progressing     Problem: Surgery Nonspecified  Goal: Absence of Bleeding  Outcome: Progressing  Goal: Effective Bowel " Elimination  Outcome: Progressing  Goal: Fluid and Electrolyte Balance  Outcome: Progressing  Goal: Blood Glucose Level Within Targeted Range  Outcome: Progressing  Goal: Absence of Infection Signs and Symptoms  Outcome: Progressing  Goal: Anesthesia/Sedation Recovery  Outcome: Progressing  Intervention: Optimize Anesthesia Recovery  Recent Flowsheet Documentation  Taken 9/14/2024 1900 by Tere Cardona, RN  Safety Promotion/Fall Prevention:   activity supervised   supervised activity  Administration (IS):   instruction provided, initial   proper technique demonstrated   self-administered  Level Incentive Spirometer (mL): 1200  Incentive Spirometer Predicted Level (mL): 2700  Number of Repetitions (IS): 5  Patient Tolerance (IS): good  Goal: Optimal Pain Control and Function  Outcome: Progressing  Goal: Nausea and Vomiting Relief  Outcome: Progressing  Goal: Effective Urinary Elimination  Outcome: Progressing  Goal: Effective Oxygenation and Ventilation  Outcome: Progressing     Problem: Comorbidity Management  Goal: Blood Glucose Levels Within Targeted Range  Outcome: Progressing   Goal Outcome Evaluation:  Pt denies pain in right hip.  2 dressings intact with small amount of blood no change from PACU.  Capnography mononitored - sats are in high 90s on room air.  Lungs clear.  Pt continues to have numbness from nerve block but can move toes.  Baseline neuropathy per pt report.  Pt self managed insulin pump and self monitored glucose levels and adjusted his base doses and bolus doses of insulin per his routine.  Tolerated clear and full liquids without any nausea.  Encouraged fluids.  No urge to void - bladder scan was 321.  IV Ancef administered this shift.  IVF at 50 ml/hr.

## 2024-09-15 NOTE — PROGRESS NOTES
"   09/15/24 0964   Appointment Info   Signing Clinician's Name / Credentials (PT) Joanna Fernandez, PT, DPT   Living Environment   People in Home child(sade), adult  (son can assist with mobility when at home)   Current Living Arrangements house   Home Accessibility no concerns  (Ramp to enter then living on main level)   Living Environment Comments ramp to enter   Self-Care   Equipment Currently Used at Home cane, straight  (cane for in the community - no AD in home)   Fall history within last six months yes   Number of times patient has fallen within last six months 1   Activity/Exercise/Self-Care Comment pt reports being independent with functional mobilty - ambulating community distances   General Information   Onset of Illness/Injury or Date of Surgery 09/14/24   Referring Physician Son Talavera MD   Patient/Family Therapy Goals Statement (PT) Return to Home   Pertinent History of Current Problem (include personal factors and/or comorbidities that impact the POC) Per Chart Review -\"74 year old male with a pertinent history of T1DM, diabetic neuropathy, and HLD who presents to this ED by EMS for evaluation of right hip pain.\" & post op -\"INTERNAL FIXATION, FRACTURE, TROCHANTERIC, HIP, USING NAIL\"   Existing Precautions/Restrictions fall;weight bearing   Weight-Bearing Status - RLE (S)  other (see comments)  (Per Chart Review -\"Flat Foot WB at %50 with walker at all times x 6 weeks\")   Range of Motion (ROM)   Range of Motion ROM deficits secondary to weakness;ROM deficits secondary to pain   Strength (Manual Muscle Testing)   Strength (Manual Muscle Testing) Deficits observed during functional mobility   Bed Mobility   Bed Mobility supine-sit   Supine-Sit [place] (Bed Mobility) maximum assist (25% patient effort);2 person assist   Sit-Stand Transfer   Comment, (Sit-Stand Transfer) Unable to complete/lift hips from bed   Gait/Stairs (Locomotion)   [place] Level (Gait) unable to assess  (d/t pain & " weakness)   Clinical Impression   Criteria for Skilled Therapeutic Intervention Yes, treatment indicated   PT Diagnosis (PT) Impaired Functional Mobility   Influenced by the following impairments weakness, decreased ROM, impaired balance   Functional limitations due to impairments gait, transfers, bed mobility   Clinical Presentation (PT Evaluation Complexity) stable   Clinical Presentation Rationale pt presents as medically diagnosed   Clinical Decision Making (Complexity) low complexity   Planned Therapy Interventions (PT) balance training;bed mobility training;gait training;home exercise program;neuromuscular re-education;strengthening;stair training;transfer training;home program guidelines   Risk & Benefits of therapy have been explained evaluation/treatment results reviewed;patient   PT Total Evaluation Time   PT Eval, Low Complexity Minutes (14926) 18   Physical Therapy Goals   PT Frequency 5x/week   PT Predicted Duration/Target Date for Goal Attainment 09/23/24   PT Goals Transfers;Gait;Bed Mobility   PT: Bed Mobility Moderate assist;Supine to/from sit;Within precautions   PT: Transfers Moderate assist;Assistive device;Within precautions   PT: Gait Moderate assist;Rolling walker;5 feet   Interventions   Interventions Quick Adds Therapeutic Activity;Neuromuscular Re-ed   Therapeutic Activity   Therapeutic Activities: dynamic activities to improve functional performance Minutes (47165) 24   Symptoms Noted During/After Treatment Fatigue   Treatment Detail/Skilled Intervention multiple reps Rolling: cueing for safety/technique/use of bed railings, Max A x2; scooting fwd EOB: cueing for safety/technique, Max A; attempted lateral scooting: cueing for safety/technique, pt unable to complete; Max A for positioning LE to prep for sit to/from stand transfer; Review WB status/precautions; Sit to supine: cueing for safety/technique, Max A x3; Anterior weight shifting in sitting - multiple reps to prep for weight  shifting for sit to/from stand transfers. Mulitple attempts sit to/from stand - unable to lift hips with Max A x2; Increased time for safe room set up, slow movements d/t pt reported anticipating pain, multiple attempts for each position change/setting up for safe mobility   Neuromuscular Re-education   Neuromuscular Re-Education Minutes (71083) 9   Symptoms Noted During/After Treatment fatigue   Treatment Detail/Skilled Intervention Extended time sitting EOB to facilitate postural control and balance: cueing for safety/technique/posture - added challenges & varying UE support. pt initially requiring min A & B UE support via bed railings but progressed to SBA with B UE support with extended time.   PT Discharge Planning   PT Plan sitting balance, LE therex, sit to/from stand transfers as appropriate   PT Discharge Recommendation (DC Rec) Transitional Care Facility   PT Rationale for DC Rec pt requires increased assist with functional mobility. Instability noted with mobility leading to an increased risk for falls. Unable to trial standing d/t weakness. TCU for improving activity tolerance/strength/balance   PT Brief overview of current status Max A x2-3 supine to/from sit; Min A  initially for sitting balance progressed to SBA with B UE support via bed railings   PT Equipment Needed at Discharge walker, rolling  (FWW)   Total Session Time   Timed Code Treatment Minutes 33   Total Session Time (sum of timed and untimed services) 51

## 2024-09-15 NOTE — PROGRESS NOTES
"[facility]    Medicine Progress Note - Hospitalist Service    Date of Admission:  9/14/2024    Assessment & Plan   74-year-old diabetic on insulin pump with diabetic neuropathy who presented with right hip pain and was found to have right intertrochanteric hip fracture and underwent ORIF on 9/14.  Patient denies any postoperative pain.    Type 1 diabetes on insulin pump  -Blood sugars reasonably well-controlled    ARF  -- Creatinine increased to 1.3  -- Ordered normal saline bolus  -- Recheck creatinine next a.m.  -- Baseline creatinine of 1.2    Leukocytosis likely reactive  -- Trending down  -- No evidence of DVT  -- Echo is unremarkable    Other medical conditions include  Hyperlipidemia, obesity, diabetic neuropathy,?Sclerosis          Diet: Advance Diet as Tolerated: Regular Diet Adult    DVT Prophylaxis: Defer to primary service  Allison Catheter: Not present  Lines: None     Cardiac Monitoring: None  Code Status: Full Code      Clinically Significant Risk Factors                            # Obesity: Estimated body mass index is 34.96 kg/m  as calculated from the following:    Height as of this encounter: 1.854 m (6' 1\").    Weight as of this encounter: 120.2 kg (265 lb)., PRESENT ON ADMISSION                  Disposition Plan     Medically Ready for Discharge: Anticipated in 2-4 Days             Alvino Giron MD  Hospitalist Service  [facility]  Securely message with [employer] (more info)  Text page via [employer] Paging/Directory   ______________________________________________________________________    Interval History   Patient new to me.  Chart reviewed.  S/p surgery.  No pain while resting in the bed.  Patient is managing his own insulin with pump.  Creatinine is trending up.  WBC is trending down.  Ultrasound of the lower extremities negative.  Echo is unremarkable.  Blood sugars are controlled.      Physical Exam   Vital Signs: Temp: 100.4  F (38  C) Temp src: " "Oral BP: 128/59 Pulse: 92   Resp: 17 SpO2: 95 % O2 Device: None (Room air) Oxygen Delivery: 2 LPM  Weight: 265 lbs 0 oz  Alert oriented x 3  No apparent distress  Lungs are clear to auscultation    Medical Decision Making       35 MINUTES SPENT BY ME on the date of service doing chart review, history, exam, documentation & further activities per the note.  MANAGEMENT DISCUSSED with the following over the past 24 hours: Patient   NOTE(S)/MEDICAL RECORDS REVIEWED over the past 24 hours: H&P       Data     I have personally reviewed the following data over the past 24 hrs:    13.9 (H)  \   13.3   / 287     136 101 27.6 (H) /  184 (H)   4.6 23 1.39 (H) \     TSH: N/A T4: N/A A1C: N/A       Imaging results reviewed over the past 24 hrs:   Recent Results (from the past 24 hour(s))   POC US Guidance Needle Placement    Narrative    Ultrasound was performed as guidance to an anesthesia procedure.  Click   \"PACS images\" hyperlink below to view any stored images.  For specific   procedure details, view procedure note authored by anesthesia.   XR Surgery JOSE RAFAEL L/T 5 Min Fluoro    Narrative    This exam was marked as non-reportable because it will not be read by a   radiologist or a [facility] non-radiologist provider.         XR Femur Port Right 2 Views    Narrative    EXAM: XR FEMUR PORT RIGHT 2 VIEWS  LOCATION: [facility]  DATE: 9/14/2024    INDICATION: Status post Hip surgery  COMPARISON: 9/14/2024.      Impression    IMPRESSION: Interval internal fixation of the proximal right femur with an IM nail and proximal and distal screws. No evidence of immediate complication. Anatomic fracture position and alignment. Postoperative air in the soft tissues. Degenerative   arthritis in the right hip.   US Lower Extremity Venous Duplex Bilateral    Narrative    EXAM: US LOWER EXTREMITY VENOUS DUPLEX BILATERAL  LOCATION: [facility]  DATE: 9/15/2024    INDICATION: EDEMA  COMPARISON: " None.  TECHNIQUE: Venous Duplex ultrasound of bilateral lower extremities with and without compression, augmentation and duplex. Color flow and spectral Doppler with waveform analysis performed.    FINDINGS: Exam includes the common femoral, femoral, popliteal veins as well as segmentally visualized deep calf veins and greater saphenous vein. Limited visualization of the calf veins due to body habitus and edema.     RIGHT: No deep vein thrombosis. No superficial thrombophlebitis. No popliteal cyst.    LEFT: No deep vein thrombosis. No superficial thrombophlebitis. No popliteal cyst.      Impression    IMPRESSION:  1.  No deep venous thrombosis in the bilateral lower extremities.

## 2024-09-16 ENCOUNTER — APPOINTMENT (OUTPATIENT)
Dept: RADIOLOGY | Facility: HOSPITAL | Age: 74
DRG: 481 | End: 2024-09-16
Attending: INTERNAL MEDICINE
Payer: COMMERCIAL

## 2024-09-16 ENCOUNTER — APPOINTMENT (OUTPATIENT)
Dept: PHYSICAL THERAPY | Facility: HOSPITAL | Age: 74
DRG: 481 | End: 2024-09-16
Payer: COMMERCIAL

## 2024-09-16 LAB
CREAT SERPL-MCNC: 1.33 MG/DL (ref 0.67–1.17)
EGFRCR SERPLBLD CKD-EPI 2021: 56 ML/MIN/1.73M2
GLUCOSE BLDC GLUCOMTR-MCNC: 107 MG/DL (ref 70–99)
GLUCOSE BLDC GLUCOMTR-MCNC: 129 MG/DL (ref 70–99)
GLUCOSE BLDC GLUCOMTR-MCNC: 145 MG/DL (ref 70–99)
GLUCOSE BLDC GLUCOMTR-MCNC: 159 MG/DL (ref 70–99)
GLUCOSE BLDC GLUCOMTR-MCNC: 224 MG/DL (ref 70–99)
HGB BLD-MCNC: 11.1 G/DL (ref 13.3–17.7)

## 2024-09-16 PROCEDURE — 73501 X-RAY EXAM HIP UNI 1 VIEW: CPT | Mod: LT

## 2024-09-16 PROCEDURE — 82565 ASSAY OF CREATININE: CPT | Performed by: INTERNAL MEDICINE

## 2024-09-16 PROCEDURE — 99232 SBSQ HOSP IP/OBS MODERATE 35: CPT | Performed by: INTERNAL MEDICINE

## 2024-09-16 PROCEDURE — 36415 COLL VENOUS BLD VENIPUNCTURE: CPT | Performed by: STUDENT IN AN ORGANIZED HEALTH CARE EDUCATION/TRAINING PROGRAM

## 2024-09-16 PROCEDURE — 85018 HEMOGLOBIN: CPT | Performed by: STUDENT IN AN ORGANIZED HEALTH CARE EDUCATION/TRAINING PROGRAM

## 2024-09-16 PROCEDURE — 250N000013 HC RX MED GY IP 250 OP 250 PS 637: Performed by: STUDENT IN AN ORGANIZED HEALTH CARE EDUCATION/TRAINING PROGRAM

## 2024-09-16 PROCEDURE — 120N000001 HC R&B MED SURG/OB

## 2024-09-16 PROCEDURE — 97110 THERAPEUTIC EXERCISES: CPT | Mod: GP

## 2024-09-16 PROCEDURE — 97530 THERAPEUTIC ACTIVITIES: CPT | Mod: GP

## 2024-09-16 RX ORDER — ASPIRIN 81 MG/1
81 TABLET ORAL 2 TIMES DAILY
Qty: 60 TABLET | Refills: 0 | Status: SHIPPED | OUTPATIENT
Start: 2024-09-16

## 2024-09-16 RX ORDER — AMOXICILLIN 250 MG
2 CAPSULE ORAL 2 TIMES DAILY PRN
Qty: 60 TABLET | Refills: 0 | Status: SHIPPED | OUTPATIENT
Start: 2024-09-16

## 2024-09-16 RX ORDER — OXYCODONE HYDROCHLORIDE 5 MG/1
5 TABLET ORAL EVERY 4 HOURS PRN
Qty: 26 TABLET | Refills: 0 | Status: SHIPPED | OUTPATIENT
Start: 2024-09-16 | End: 2024-09-26

## 2024-09-16 RX ORDER — ACETAMINOPHEN 325 MG/1
975 TABLET ORAL EVERY 8 HOURS
Qty: 100 TABLET | Refills: 0 | Status: SHIPPED | OUTPATIENT
Start: 2024-09-16

## 2024-09-16 RX ADMIN — SENNOSIDES AND DOCUSATE SODIUM 1 TABLET: 8.6; 5 TABLET ORAL at 08:18

## 2024-09-16 RX ADMIN — ASPIRIN 81 MG: 81 TABLET, COATED ORAL at 20:43

## 2024-09-16 RX ADMIN — OXYCODONE HYDROCHLORIDE 5 MG: 5 TABLET ORAL at 08:31

## 2024-09-16 RX ADMIN — POLYETHYLENE GLYCOL 3350 17 G: 17 POWDER, FOR SOLUTION ORAL at 08:18

## 2024-09-16 RX ADMIN — ACETAMINOPHEN 975 MG: 325 TABLET ORAL at 20:43

## 2024-09-16 RX ADMIN — OXYCODONE HYDROCHLORIDE 5 MG: 5 TABLET ORAL at 17:53

## 2024-09-16 RX ADMIN — ASPIRIN 81 MG: 81 TABLET, COATED ORAL at 08:17

## 2024-09-16 RX ADMIN — ACETAMINOPHEN 975 MG: 325 TABLET ORAL at 06:24

## 2024-09-16 RX ADMIN — CALCIUM CARBONATE (ANTACID) CHEW TAB 500 MG 1000 MG: 500 CHEW TAB at 11:15

## 2024-09-16 ASSESSMENT — ACTIVITIES OF DAILY LIVING (ADL)
ADLS_ACUITY_SCORE: 36
DEPENDENT_IADLS:: INDEPENDENT
ADLS_ACUITY_SCORE: 36

## 2024-09-16 NOTE — PROGRESS NOTES
"[facility]    Medicine Progress Note - Hospitalist Service    Date of Admission:  9/14/2024    Assessment & Plan   74-year-old diabetic on insulin pump with diabetic neuropathy who presented with right hip pain and was found to have right intertrochanteric hip fracture and underwent ORIF on 9/14.  Patient denies any postoperative pain.    Type 1 diabetes on insulin pump  -Blood sugars reasonably well-controlled  Lab Results   Component Value Date    A1C 6.1 09/14/2024    A1C 5.8 01/05/2023    A1C 6.6 02/22/2017    A1C 6.0 08/12/2016         ARF  -- Creatinine increased to 1.3  -Creatinine improved with normal saline bolus  --  Creatinine   Date Value Ref Range Status   09/16/2024 1.33 (H) 0.67 - 1.17 mg/dL Final   -- Baseline creatinine of 1.2    Leukocytosis likely reactive  -- Trending down  -- No evidence of DVT  -- Echo is unremarkable    Left hip pain noted on 9/16  -- Ordered x-ray of the left hip which showed degenerative changes  -- No fracture or dislocation  -- Continue physical therapy.    Other medical conditions include  Hyperlipidemia, obesity, diabetic neuropathy,?Sclerosis          Diet: Advance Diet as Tolerated: Regular Diet Adult  Discharge Instruction - Regular Diet Adult    DVT Prophylaxis: Defer to primary service  Allison Catheter: PRESENT, indication: Acute retention or obstruction  Lines: None     Cardiac Monitoring: None  Code Status: Full Code      Clinically Significant Risk Factors                            # Obesity: Estimated body mass index is 34.96 kg/m  as calculated from the following:    Height as of this encounter: 1.854 m (6' 1\").    Weight as of this encounter: 120.2 kg (265 lb)., PRESENT ON ADMISSION     # Financial/Environmental Concerns: none               Disposition Plan     Medically Ready for Discharge: Anticipated in 2-4 Days             Alvino Giron MD  Hospitalist Service  [facility]  Securely message with [email or web address] (more " info)  Text page via "[employer] Paging/Directory   ______________________________________________________________________    Interval History   Patient unable to participate in physical therapy due to left hip pain.  Ordered x-ray of the left hip.  It did not show any fracture or dislocation.    Physical therapy can be resumed.  Blood sugars are controlled.      Physical Exam   Vital Signs: Temp: 99.3  F (37.4  C) Temp src: Oral BP: 127/64 Pulse: 95   Resp: 20 SpO2: 94 % O2 Device: None (Room air)    Weight: 265 lbs 0 oz  Alert oriented x 3  No apparent distress  Lungs are clear to auscultation    Medical Decision Making       35 MINUTES SPENT BY ME on the date of service doing chart review, history, exam, documentation & further activities per the note.  MANAGEMENT DISCUSSED with the following over the past 24 hours: Patient   NOTE(S)/MEDICAL RECORDS REVIEWED over the past 24 hours: H&P       Data     I have personally reviewed the following data over the past 24 hrs:    N/A  \   11.1 (L)   / N/A     N/A N/A N/A /  129 (H)   N/A N/A 1.33 (H) \       Imaging results reviewed over the past 24 hrs:   Recent Results (from the past 24 hour(s))   XR Hip Left 1 View    Narrative    EXAM: XR HIP LEFT 1 VIEW  LOCATION: [facility]  DATE: 9/16/2024    INDICATION: left hip pain  r o fracture  COMPARISON: 9/14/2024      Impression    IMPRESSION: Normal alignment. No acute fracture. Moderate degenerative arthritis left hip joint. If symptoms persist or there is high clinical suspicion, consider additional evaluation with lateral x-ray left hip or CT or MRI.

## 2024-09-16 NOTE — PLAN OF CARE
Goal Outcome Evaluation:      Plan of Care Reviewed With: patient          Outcome Evaluation: Anticipate dishcarge to TCU when medically appropraite.

## 2024-09-16 NOTE — PLAN OF CARE
Problem: Adult Inpatient Plan of Care  Goal: Plan of Care Review  Description: The Plan of Care Review/Shift note should be completed every shift.  The Outcome Evaluation is a brief statement about your assessment that the patient is improving, declining, or no change.  This information will be displayed automatically on your shift  note.  Outcome: Progressing   Goal Outcome Evaluation:       Educated pt on treatment plan, pt voiced understanding.      Problem: Mobility Impairment  Goal: Optimal Mobility  Outcome: Not Progressing  Intervention: Optimize Mobility  Recent Flowsheet Documentation  Taken 9/16/2024 0815 by Kristan Mcgrath RN  Activity Management:   activity adjusted per tolerance   activity encouraged     Pt having increased pain to LLE (non-operative side) Portable xray ordered, negative results. PT to come this afternoon to attempt session. Pt to be 50% flat foot WB to RLE (operative side).    Pt managing insulin pump appropriately, staff takes bedside blood glucose. PRN oxycodone admin. Prior for pain. Allison remains intact & patent.

## 2024-09-16 NOTE — PROGRESS NOTES
"Orthopedic Progress Note      Assessment: 2 Days Post-Op  S/P Procedure(s):  INTERNAL FIXATION, FRACTURE, TROCHANTERIC, HIP, USING NAIL     Plan:   - Continue PT/OT  - Weightbearing status: FFWB 50% RLE  - Activity: Up with assist and assistive device until independent.  - Anticoagulation: ASA 81 PO BID in addition to SCDs, chris stockings and early ambulation.  - Pain Management; continue current regimen  - Diet: progress diet as tolerated  - Labs: hgb 11.1, transfuse if <7.0. No indication today  - Left hip Xray reviewed.  No acute fractures.  Severe degenerative changes noted, suspect this is exacerbated and likely cause of pain in left hip.  - Dressing: Keep dry and intact  - Elevation: Elevate RLE on pillow to keep above the level of the heart as much as possible  - Follow-up: Outpatient follow up in 2 weeks  - Disposition: Anticipate discharge TCU       Subjective:  Pain: moderate  Nausea, Vomiting:  No  Lightheadedness, Dizziness:  No  Neuro:  Patient denies new onset numbness or paresthesias  Fever, chills: No  Chest pain: No  SOB: No    Patient reports feeling well today. Patient reports pain is tolerable with current pain regimen.  Pain worse in left hip than right. Patient eating and drinking well. Patient voiding and passing gas however no BM. All questions/concerns answered.      Objective:  /64 (BP Location: Right arm)   Pulse 95   Temp 99.3  F (37.4  C) (Oral)   Resp 20   Ht 1.854 m (6' 1\")   Wt 120.2 kg (265 lb)   SpO2 94%   BMI 34.96 kg/m      The patient is A&Ox3. Appears comfortable, sitting up at bedside.  Calves without tenderness, neg Johnny's  Brisk capillary refill in the toes.   Palpable Left dorsalis pedis pulse. Left foot warm & well-perfused.  Sensation is intact to light touch & equal bilaterally in the femoral, DP, SP & tibial nerve distributions.  ROM: Appropriately flexes & extends all toes bilaterally.   Motor: +5/5 dorsiflexion, plantar flexion & EHL bilaterally.   Leg " "lengths equal.  Dressing C/D/I without strikethrough, no surrounding erythema.      No drain     Pertinent Labs   Lab Results: personally reviewed.   No results found for: \"INR\", \"PROTIME\"  Lab Results   Component Value Date    WBC 13.9 (H) 09/15/2024    HGB 11.1 (L) 09/16/2024    HCT 40.4 09/15/2024    MCV 92 09/15/2024     09/15/2024     Lab Results   Component Value Date     09/15/2024    CO2 23 09/15/2024     Imaging  EXAM: XR HIP LEFT 1 VIEW  LOCATION: [facility]  DATE: 9/16/2024     INDICATION: left hip pain  r o fracture  COMPARISON: 9/14/2024                                                                      IMPRESSION: Normal alignment. No acute fracture. Moderate degenerative arthritis left hip joint. If symptoms persist or there is high clinical suspicion, consider additional evaluation with lateral x-ray left hip or CT or MRI.    Report completed by:  ESAU MEHTA PA-C  Date: 09/16/2024  [facility]              "

## 2024-09-16 NOTE — CONSULTS
Care Management Initial Consult    General Information  Assessment completed with: Patient,    Type of CM/SW Visit: Initial Assessment    Primary Care Provider verified and updated as needed: Yes   Readmission within the last 30 days: no previous admission in last 30 days      Reason for Consult: discharge planning  Advance Care Planning:            Communication Assessment  Patient's communication style: spoken language (English or Bilingual)    Hearing Difficulty or Deaf: no   Wear Glasses or Blind: yes    Cognitive  Cognitive/Neuro/Behavioral: WDL  Level of Consciousness: alert  Arousal Level: opens eyes spontaneously  Orientation: oriented x 4  Mood/Behavior: calm, cooperative  Best Language: 0 - No aphasia  Speech: clear, spontaneous    Living Environment:   People in home: alone  Julio C Hernandez (son)  Current living Arrangements: house      Able to return to prior arrangements: yes       Family/Social Support:  Care provided by: self  Provides care for: no one  Marital Status:   Support system: Children          Description of Support System: Supportive, Involved         Current Resources:   Patient receiving home care services: No        Community Resources: None  Equipment currently used at home: cane, straight, other (see comments)  Supplies currently used at home: None    Employment/Financial:  Employment Status: retired        Financial Concerns: none   Referral to Financial Worker: No       Does the patient's insurance plan have a 3 day qualifying hospital stay waiver?  No    Lifestyle & Psychosocial Needs:  Social Determinants of Health     Food Insecurity: Low Risk  (9/14/2024)    Food Insecurity     Within the past 12 months, did you worry that your food would run out before you got money to buy more?: No     Within the past 12 months, did the food you bought just not last and you didn t have money to get more?: No   Depression: Not at risk (7/19/2021)    Received from [facility]    PHQ-2     PHQ-2 TOTAL SCORE: 0   Housing Stability: Low Risk  (9/14/2024)    Housing Stability     Do you have housing? : Yes     Are you worried about losing your housing?: No   Tobacco Use: Medium Risk (9/14/2024)    Patient History     Smoking Tobacco Use: Former     Smokeless Tobacco Use: Never     Passive Exposure: Not on file   Financial Resource Strain: Low Risk  (9/14/2024)    Financial Resource Strain     Within the past 12 months, have you or your family members you live with been unable to get utilities (heat, electricity) when it was really needed?: No   Alcohol Use: Not on file   Transportation Needs: Low Risk  (9/14/2024)    Transportation Needs     Within the past 12 months, has lack of transportation kept you from medical appointments, getting your medicines, non-medical meetings or appointments, work, or from getting things that you need?: No   Physical Activity: Not on file   Interpersonal Safety: Low Risk  (9/14/2024)    Interpersonal Safety     Do you feel physically and emotionally safe where you currently live?: Yes     Within the past 12 months, have you been hit, slapped, kicked or otherwise physically hurt by someone?: No     Within the past 12 months, have you been humiliated or emotionally abused in other ways by your partner or ex-partner?: No   Stress: Not on file   Social Connections: Unknown (1/1/2022)    [facility]    Social Connections     Frequency of Communication with Friends and Family: Not on file   Health Literacy: Not on file       Functional Status:  Prior to admission patient needed assistance:   Dependent ADLs:: Independent  Dependent IADLs:: Independent       Mental Health Status:  Mental Health Status: No Current Concerns       Chemical Dependency Status:  Chemical Dependency Status: No Current Concerns             Values/Beliefs:  Spiritual, Cultural Beliefs, Roman [employer] Practices, Values  "that affect care: no               Discussed  Partnership in Safe Discharge Planning  document with patient/family: NA as patient is appropriately participating in safe discharge planning     Additional Information:  SW met with patient at bedside; introduced self and CM role. Patient reports living in a single level home with son, Kip. Patient reports independence at baseline with I/ADLs and some baseline weakness in his legs due to a work accident a number of years ago. Patient uses a cane \"when navigating unknown terrain\". Discussed recommendation for transitional care. Pt verbalizes agreement with this and denies preference related to specific facility. Pt requests referrals to [facility], and [facility] facilities. Patient requests medical transport at discharge and verbalizes understanding of potential out of pocket cost.     Next Steps: Secure TCU, arrange medical transport.     Patient accepted at [facility], facility will hold bed until Wednesday, 9/18.    TAMAR Christine at 9:39 AM on 09/16/24       "

## 2024-09-16 NOTE — PROGRESS NOTES
Progress Note    Time of event: 6:09 AM September 16, 2024    Description of event:  Paged by bedside nurse about placement of moore catheter. Nursing staff placed catheter due to need to straight cath multiple times due to urinary retention.     Plan:  - Order placed for moore catheter. Trial catheter removal and voiding protocol per nursing protocol.     Discussed with: bedside nurse    Anjelica Keller DO

## 2024-09-16 NOTE — PLAN OF CARE
Pt is A&Ox4, calm, cooperative, very friendly  - vitals stable  - urinary catheter present  - right hip incisions: old drainage, serosanguineous  - right PCD on, pt declined left PCD due to left PCD increasing pain to left upper leg pain  - 0200 blood glucose: 159  - no pain at rest, ice applied       Problem: Mobility Impairment  Goal: Optimal Mobility  9/16/2024 0438 by Keeley Farrell RN  Outcome: Not Progressing  9/16/2024 0438 by Keeley Farrell RN  Outcome: Progressing  Intervention: Optimize Mobility  Recent Flowsheet Documentation  Taken 9/16/2024 0200 by Keeley Farrell RN  Activity Management: bedrest  Positioning/Transfer Devices:   pillows   in use     Problem: Surgery Nonspecified  Goal: Effective Urinary Elimination  9/16/2024 0438 by Keeley Farrell RN  Outcome: Not Progressing  9/16/2024 0438 by Keeley Farrell RN  Outcome: Progressing  Intervention: Monitor and Manage Urinary Retention  Recent Flowsheet Documentation  Taken 9/16/2024 0200 by Keeley Farrell RN  Urinary Elimination Promotion:   toileting offered   frequent voiding encouraged   voiding relaxation promoted   bladder volume assessed by ultrasound     Problem: Adult Inpatient Plan of Care  Goal: Plan of Care Review  Description: The Plan of Care Review/Shift note should be completed every shift.  The Outcome Evaluation is a brief statement about your assessment that the patient is improving, declining, or no change.  This information will be displayed automatically on your shift  note.  9/16/2024 0438 by Keeley Farrell RN  Outcome: Progressing  Flowsheets (Taken 9/16/2024 0438)  Plan of Care Reviewed With: patient  Overall Patient Progress: improving  9/16/2024 0438 by Keeley Farrell RN  Outcome: Progressing  Flowsheets (Taken 9/16/2024 0438)  Plan of Care Reviewed With: patient  Overall Patient Progress: improving  Goal: Patient-Specific Goal (Individualized)  Description: You can add care plan  "individualizations to a care plan. Examples of Individualization might be:  \"Parent requests to be called daily at 9am for status\", \"I have a hard time hearing out of my right ear\", or \"Do not touch me to wake me up as it startles  me\".  9/16/2024 0438 by Keeley Farrell RN  Outcome: Progressing  9/16/2024 0438 by Keeley Farrell RN  Outcome: Progressing  Goal: Absence of Hospital-Acquired Illness or Injury  9/16/2024 0438 by Keeley Farrell RN  Outcome: Progressing  9/16/2024 0438 by Keeley Farrell RN  Outcome: Progressing  Intervention: Identify and Manage Fall Risk  Recent Flowsheet Documentation  Taken 9/16/2024 0200 by Keeley Farrell RN  Safety Promotion/Fall Prevention:   assistive device/personal items within reach   patient and family education   safety round/check completed   room near nurse's station  Intervention: Prevent Skin Injury  Recent Flowsheet Documentation  Taken 9/16/2024 0200 by Keeley Farrell RN  Body Position: supine, head elevated  Skin Protection:   incontinence pads utilized   adhesive use limited  Device Skin Pressure Protection:   positioning supports utilized   absorbent pad utilized/changed   adhesive use limited   pressure points protected  Intervention: Prevent and Manage VTE (Venous Thromboembolism) Risk  Recent Flowsheet Documentation  Taken 9/16/2024 0200 by Keeley Farrell RN  VTE Prevention/Management: SCDs on (sequential compression devices)  Intervention: Prevent Infection  Recent Flowsheet Documentation  Taken 9/16/2024 0200 by Keeley Farrell RN  Infection Prevention:   rest/sleep promoted   single patient room provided   hand hygiene promoted   equipment surfaces disinfected  Goal: Optimal Comfort and Wellbeing  9/16/2024 0438 by Keeley Farrell RN  Outcome: Progressing  9/16/2024 0438 by Keeley Farrell RN  Outcome: Progressing  Intervention: Monitor Pain and Promote Comfort  Recent Flowsheet Documentation  Taken 9/16/2024 0200 by Bud" MARIA GUADALUPE Mina  Pain Management Interventions:   pillow support provided   quiet environment facilitated   repositioned  Goal: Readiness for Transition of Care  9/16/2024 0438 by Keeley Farrell RN  Outcome: Progressing  9/16/2024 0438 by Keeley Farrell RN  Outcome: Progressing     Problem: Risk for Delirium  Goal: Optimal Coping  9/16/2024 0438 by Keeley Farrell RN  Outcome: Progressing  9/16/2024 0438 by Keeley Farrell RN  Outcome: Progressing  Intervention: Optimize Psychosocial Adjustment to Delirium  Recent Flowsheet Documentation  Taken 9/16/2024 0200 by Keeley Farrell RN  Supportive Measures:   active listening utilized   verbalization of feelings encouraged   positive reinforcement provided  Goal: Improved Behavioral Control  9/16/2024 0438 by Keeley Farrell RN  Outcome: Progressing  9/16/2024 0438 by Keeley Farrell RN  Outcome: Progressing  Intervention: Prevent and Manage Agitation  Recent Flowsheet Documentation  Taken 9/16/2024 0200 by Keeley Farrell RN  Environment Familiarity/Consistency: daily routine followed  Intervention: Minimize Safety Risk  Recent Flowsheet Documentation  Taken 9/16/2024 0200 by Keeley Farrell RN  Communication Enhancement Strategies: call light answered in person  Enhanced Safety Measures:   assistive devices when indicated   pain management   review medications for side effects with activity   room near unit station  Goal: Improved Attention and Thought Clarity  9/16/2024 0438 by Keeley Farrell RN  Outcome: Progressing  9/16/2024 0438 by Keeley Farrell RN  Outcome: Progressing  Intervention: Maximize Cognitive Function  Recent Flowsheet Documentation  Taken 9/16/2024 0200 by Keeley Farrell RN  Sensory Stimulation Regulation:   lighting decreased   care clustered   quiet environment promoted  Reorientation Measures: clock in view  Goal: Improved Sleep  9/16/2024 0438 by Keeley Farrell RN  Outcome: Progressing  9/16/2024 0438 by  Keeley Farrell RN  Outcome: Progressing     Problem: Surgery Nonspecified  Goal: Absence of Bleeding  9/16/2024 0438 by Keeley Farrell RN  Outcome: Progressing  9/16/2024 0438 by Keeley Farrell RN  Outcome: Progressing  Intervention: Monitor and Manage Bleeding  Recent Flowsheet Documentation  Taken 9/16/2024 0200 by Keeley Farrell RN  Bleeding Management: dressing monitored  Goal: Effective Bowel Elimination  9/16/2024 0438 by Keeley Farrell RN  Outcome: Progressing  9/16/2024 0438 by Keeley Farrell RN  Outcome: Progressing  Goal: Fluid and Electrolyte Balance  9/16/2024 0438 by Keeley Farrell RN  Outcome: Progressing  9/16/2024 0438 by Keeley Farrell RN  Outcome: Progressing  Intervention: Monitor and Manage Fluid and Electrolyte Balance  Recent Flowsheet Documentation  Taken 9/16/2024 0200 by Keeley Farrell RN  Fluid/Electrolyte Management: fluids provided  Goal: Blood Glucose Level Within Targeted Range  9/16/2024 0438 by Keeley Farrell RN  Outcome: Progressing  9/16/2024 0438 by Keeley Farrell RN  Outcome: Progressing  Intervention: Optimize Glycemic Control  Recent Flowsheet Documentation  Taken 9/16/2024 0200 by Keeley Farrell RN  Glycemic Management: (pt has insulin implant) blood glucose monitored  Goal: Absence of Infection Signs and Symptoms  9/16/2024 0438 by Keeley Farrell RN  Outcome: Progressing  9/16/2024 0438 by Keeley Farrell RN  Outcome: Progressing  Goal: Anesthesia/Sedation Recovery  9/16/2024 0438 by Keeley Farrell RN  Outcome: Progressing  9/16/2024 0438 by Keeley Farrell RN  Outcome: Progressing  Intervention: Optimize Anesthesia Recovery  Recent Flowsheet Documentation  Taken 9/16/2024 0200 by Keeley Farrell RN  Safety Promotion/Fall Prevention:   assistive device/personal items within reach   patient and family education   safety round/check completed   room near nurse's station  Administration (IS):   instruction provided,  follow-up   self-administered   proper technique demonstrated  Reorientation Measures: clock in view  Level Incentive Spirometer (mL): 1200  Incentive Spirometer Predicted Level (mL): 2700  Number of Repetitions (IS): 1  Patient Tolerance (IS): good  Goal: Optimal Pain Control and Function  9/16/2024 0438 by Keeley Farrell RN  Outcome: Progressing  9/16/2024 0438 by Keeley Farrell RN  Outcome: Progressing  Intervention: Prevent or Manage Pain  Recent Flowsheet Documentation  Taken 9/16/2024 0200 by Keeley Farrell RN  Pain Management Interventions:   pillow support provided   quiet environment facilitated   repositioned  Goal: Nausea and Vomiting Relief  9/16/2024 0438 by Keeley Farrell RN  Outcome: Progressing  9/16/2024 0438 by Keeley Farrell RN  Outcome: Progressing  Goal: Effective Oxygenation and Ventilation  9/16/2024 0438 by Keeley Farrell RN  Outcome: Progressing  9/16/2024 0438 by Keeley Farrell RN  Outcome: Progressing  Intervention: Optimize Oxygenation and Ventilation  Recent Flowsheet Documentation  Taken 9/16/2024 0200 by Keeley Farrell RN  Airway/Ventilation Management: pulmonary hygiene promoted  Head of Bed (HOB) Positioning: HOB at 30 degrees     Problem: Comorbidity Management  Goal: Blood Glucose Levels Within Targeted Range  9/16/2024 0438 by Keeley Farrell RN  Outcome: Progressing  9/16/2024 0438 by Keeley Farrell RN  Outcome: Progressing  Intervention: Monitor and Manage Glycemia  Recent Flowsheet Documentation  Taken 9/16/2024 0200 by Keeley Farrell RN  Glycemic Management: (pt has insulin implant) blood glucose monitored  Medication Review/Management: medications reviewed   Goal Outcome Evaluation:      Plan of Care Reviewed With: patient    Overall Patient Progress: improvingOverall Patient Progress: improving

## 2024-09-17 ENCOUNTER — LAB REQUISITION (OUTPATIENT)
Dept: LAB | Facility: CLINIC | Age: 74
End: 2024-09-17
Payer: COMMERCIAL

## 2024-09-17 VITALS
BODY MASS INDEX: 35.12 KG/M2 | HEART RATE: 91 BPM | HEIGHT: 73 IN | TEMPERATURE: 98 F | RESPIRATION RATE: 20 BRPM | SYSTOLIC BLOOD PRESSURE: 136 MMHG | DIASTOLIC BLOOD PRESSURE: 64 MMHG | WEIGHT: 265 LBS | OXYGEN SATURATION: 95 %

## 2024-09-17 DIAGNOSIS — Z11.1 ENCOUNTER FOR SCREENING FOR RESPIRATORY TUBERCULOSIS: ICD-10-CM

## 2024-09-17 LAB
GLUCOSE BLDC GLUCOMTR-MCNC: 118 MG/DL (ref 70–99)
GLUCOSE BLDC GLUCOMTR-MCNC: 130 MG/DL (ref 70–99)
GLUCOSE BLDC GLUCOMTR-MCNC: 139 MG/DL (ref 70–99)

## 2024-09-17 PROCEDURE — 250N000013 HC RX MED GY IP 250 OP 250 PS 637: Performed by: STUDENT IN AN ORGANIZED HEALTH CARE EDUCATION/TRAINING PROGRAM

## 2024-09-17 PROCEDURE — 250N000013 HC RX MED GY IP 250 OP 250 PS 637: Performed by: PHYSICIAN ASSISTANT

## 2024-09-17 PROCEDURE — 99239 HOSP IP/OBS DSCHRG MGMT >30: CPT | Performed by: INTERNAL MEDICINE

## 2024-09-17 RX ORDER — AMOXICILLIN 250 MG
2 CAPSULE ORAL 2 TIMES DAILY
Status: DISCONTINUED | OUTPATIENT
Start: 2024-09-17 | End: 2024-09-17 | Stop reason: HOSPADM

## 2024-09-17 RX ORDER — POLYETHYLENE GLYCOL 3350 17 G/17G
17 POWDER, FOR SOLUTION ORAL 2 TIMES DAILY
Status: DISCONTINUED | OUTPATIENT
Start: 2024-09-17 | End: 2024-09-17 | Stop reason: HOSPADM

## 2024-09-17 RX ADMIN — POLYETHYLENE GLYCOL 3350 17 G: 17 POWDER, FOR SOLUTION ORAL at 08:07

## 2024-09-17 RX ADMIN — DICLOFENAC SODIUM 4 G: 10 GEL TOPICAL at 12:03

## 2024-09-17 RX ADMIN — SENNOSIDES AND DOCUSATE SODIUM 1 TABLET: 8.6; 5 TABLET ORAL at 08:07

## 2024-09-17 RX ADMIN — OXYCODONE HYDROCHLORIDE 10 MG: 5 TABLET ORAL at 11:11

## 2024-09-17 RX ADMIN — ACETAMINOPHEN 975 MG: 325 TABLET ORAL at 04:32

## 2024-09-17 RX ADMIN — ACETAMINOPHEN 975 MG: 325 TABLET ORAL at 13:01

## 2024-09-17 RX ADMIN — ASPIRIN 81 MG: 81 TABLET, COATED ORAL at 08:07

## 2024-09-17 ASSESSMENT — ACTIVITIES OF DAILY LIVING (ADL)
ADLS_ACUITY_SCORE: 36

## 2024-09-17 NOTE — PLAN OF CARE
Physical Therapy Discharge Summary    Reason for therapy discharge:    Discharged to transitional care facility.    Progress towards therapy goal(s). See goals on Care Plan in [facility] electronic health record for goal details.  Goals not met.  Barriers to achieving goals:   discharge from facility.    Therapy recommendation(s):    Continued therapy is recommended.  Rationale/Recommendations:  at TCU.

## 2024-09-17 NOTE — PLAN OF CARE
Occupational Therapy Discharge Summary    Reason for therapy discharge:    Discharged to transitional care facility.    Progress towards therapy goal(s). See goals on Care Plan in [facility] electronic health record for goal details.  Goals partially met.  Barriers to achieving goals:   discharge from facility.    Therapy recommendation(s):    Continued therapy is recommended.  Rationale/Recommendations:  @U.    Sujatha JARQUIN, OTR/L, CLT 9/17/2024 , 3:55 PM      Goal Outcome Evaluation:

## 2024-09-17 NOTE — PROGRESS NOTES
"Orthopedic Progress Note      Assessment: 3 Days Post-Op  S/P Procedure(s):  INTERNAL FIXATION, FRACTURE, TROCHANTERIC, HIP, USING NAIL     Plan:   - Continue PT/OT  - Weightbearing status: FFWB 50% RLE  - Activity: Up with assist and assistive device until independent.  - Anticoagulation: ASA 81 PO BID in addition to SCDs, chris stockings and early ambulation.  - Pain Management; continue current regimen  - Diet: progress diet as tolerated  - Labs: hgb 11.1, transfuse if <7.0. No indication today  - Left hip Xray reviewed.  No acute fractures.  Severe degenerative changes noted, suspect this is exacerbated and likely cause of pain in left hip.  - Dressing: Keep dry and intact  - Elevation: Elevate RLE on pillow to keep above the level of the heart as much as possible  - Follow-up: Outpatient follow up in 2 weeks  - Disposition: Anticipate discharge TCU.  Okay to discharge from an orthopedic standpoint.        Subjective:  Pain: moderate  Nausea, Vomiting:  No  Lightheadedness, Dizziness:  No  Neuro:  Patient denies new onset numbness or paresthesias  Fever, chills: No  Chest pain: No  SOB: No    Patient reports feeling well today. Patient reports pain is tolerable with current pain regimen.  Pain worse in left hip than right. Patient eating and drinking well. Patient voiding and passing gas however no BM. All questions/concerns answered.      Objective:  /64 (BP Location: Right arm)   Pulse 91   Temp 98  F (36.7  C) (Oral)   Resp 20   Ht 1.854 m (6' 1\")   Wt 120.2 kg (265 lb)   SpO2 95%   BMI 34.96 kg/m      The patient is A&Ox3. Appears comfortable, sitting up at bedside.  Calves without tenderness, neg Johnny's  Brisk capillary refill in the toes.   Palpable Left dorsalis pedis pulse. Left foot warm & well-perfused.  Sensation is intact to light touch & equal bilaterally in the femoral, DP, SP & tibial nerve distributions.  ROM: Appropriately flexes & extends all toes bilaterally.   Motor: +5/5 " "dorsiflexion, plantar flexion & EHL bilaterally.   Leg lengths equal.  Dressing C/D/I without strikethrough, no surrounding erythema.      No drain     Pertinent Labs   Lab Results: personally reviewed.   No results found for: \"INR\", \"PROTIME\"  Lab Results   Component Value Date    WBC 13.9 (H) 09/15/2024    HGB 11.1 (L) 09/16/2024    HCT 40.4 09/15/2024    MCV 92 09/15/2024     09/15/2024     Lab Results   Component Value Date     09/15/2024    CO2 23 09/15/2024     Imaging  EXAM: XR HIP LEFT 1 VIEW  LOCATION: [facility]  DATE: 9/16/2024     INDICATION: left hip pain  r o fracture  COMPARISON: 9/14/2024                                                                      IMPRESSION: Normal alignment. No acute fracture. Moderate degenerative arthritis left hip joint. If symptoms persist or there is high clinical suspicion, consider additional evaluation with lateral x-ray left hip or CT or MRI.    Report completed by:  ESAU MEHTA PA-C  Date: 09/17/2024  [facility]              "

## 2024-09-17 NOTE — PLAN OF CARE
Goal Outcome Evaluation:      Plan of Care Reviewed With: patient    Overall Patient Progress: improving      Problem: Adult Inpatient Plan of Care  Goal: Optimal Comfort and Wellbeing  Intervention: Monitor Pain and Promote Comfort  Recent Flowsheet Documentation  Taken 9/17/2024 0756 by Tayla Rocha RN  Pain Management Interventions: pillow support provided     Problem: Adult Inpatient Plan of Care  Goal: Absence of Hospital-Acquired Illness or Injury  Intervention: Identify and Manage Fall Risk  Problem: Risk for Delirium  Goal: Improved Behavioral Control  Intervention: Minimize Safety Risk  Recent Flowsheet Documentation  Taken 9/17/2024 0800 by Tayla Rocha RN  Communication Enhancement Strategies: call light answered in person  Enhanced Safety Measures:   assistive devices when indicated   pain management     Problem: Comorbidity Management  Goal: Blood Glucose Levels Within Targeted Range  Outcome: Progressing  Intervention: Monitor and Manage Glycemia  Recent Flowsheet Documentation  Taken 9/17/2024 0800 by Tayla Rocha RN  Medication Review/Management: medications reviewed     Problem: Surgery Nonspecified  Goal: Optimal Pain Control and Function  Outcome: Progressing  Intervention: Prevent or Manage Pain  Recent Flowsheet Documentation  Taken 9/17/2024 0756 by Tayla Rocha RN  Pain Management Interventions: pillow support provided     Recent Flowsheet Documentation  Taken 9/17/2024 0800 by Tayla Rocha RN  Safety Promotion/Fall Prevention:   treat underlying cause   treat reversible contributory factors   room organization consistent   room door open   nonskid shoes/slippers when out of bed   increase visualization of patient   increased rounding and observation   clutter free environment maintained   Patient is alert oriented x 4.   Insulin self-managing   pIVs bilateral arm removed. Single lumen Allison is present with normal urine color & odor.Patient is leaving with Allison.   Continue  promote on BM since last BM 9/13. No abdomen discomfort stated by patient.    Discharge to TCU today   Pain goes up to 6/10. Helps with oxycodone and Tylenol.   Tayla Rocah RN on 9/17/2024 at 1:58 PM

## 2024-09-17 NOTE — DISCHARGE SUMMARY
"[facility]  Hospitalist Discharge Summary      Date of Admission:  9/14/2024  Date of Discharge:  9/17/2024  Discharging Provider: Alvino Giron MD  Discharge Service: Hospitalist Service    Discharge Diagnoses   Right hip fracture  Type 1 diabetes on insulin pump  SARIAH on CKD  Leukocytosis  Left hip pain due to degenerative changes      Clinically Significant Risk Factors     # Obesity: Estimated body mass index is 34.96 kg/m  as calculated from the following:    Height as of this encounter: 1.854 m (6' 1\").    Weight as of this encounter: 120.2 kg (265 lb).       Follow-ups Needed After Discharge   Follow-up Appointments     Follow Up Care      Please follow-up with Dr. Talavera's team in 2 weeks at [facility].   Call our scheduling line at [phone] to make an appointment, if you do   not already have one scheduled.        Follow Up and recommended labs and tests      Follow up with Nursing home physician.  No follow up labs or test are   needed.  Follow-up with orthopedics as recommended        {    Unresulted Labs Ordered in the Past 30 Days of this Admission       No orders found from 8/15/2024 to 9/15/2024.        These results will be followed up by not applicable    Discharge Disposition   Discharged to short-term care facility  Condition at discharge: Stable    Hospital Course   74-year-old diabetic on insulin pump with diabetic neuropathy who presented with right hip pain and was found to have right intertrochanteric hip fracture and underwent ORIF on 9/14.  Patient denies any postoperative pain.    Type 1 diabetes on insulin pump  -Blood sugars reasonably well-controlled  Lab Results   Component Value Date    A1C 6.1 09/14/2024    A1C 5.8 01/05/2023    A1C 6.6 02/22/2017    A1C 6.0 08/12/2016         ARF  -- Creatinine increased to 1.3  -Creatinine improved with normal saline bolus  --  Creatinine   Date Value Ref Range Status   09/16/2024 1.33 (H) 0.67 - 1.17 mg/dL Final " "  -- Baseline creatinine of 1.2    Leukocytosis likely reactive  -- Trending down  -- No evidence of DVT  WBC Count   Date Value Ref Range Status   09/15/2024 13.9 (H) 4.0 - 11.0 10e3/uL Final   -- Echo is unremarkable    Left hip pain noted on 9/16  -- x-ray of the left hip which showed degenerative changes  -- No fracture or dislocation  -- Continue physical therapy.    Acute urinary retention  -- Failed weaning trial.  Allison's inserted on 9/15  -- Trial of voiding in 1 week    Other medical conditions include  Hyperlipidemia, obesity, diabetic neuropathy,?Sclerosis    Consultations This Hospital Stay   PHARMACY IP CONSULT  ORTHOPEDIC SURGERY IP CONSULT  PHYSICAL THERAPY ADULT IP CONSULT  OCCUPATIONAL THERAPY ADULT IP CONSULT  CARE MANAGEMENT / SOCIAL WORK IP CONSULT  PHYSICAL THERAPY ADULT IP CONSULT  OCCUPATIONAL THERAPY ADULT IP CONSULT    Code Status   Full Code    Time Spent on this Encounter   IAlvino MD, personally saw the patient today and spent greater than 30 minutes discharging this patient.       Alvino Giron MD  [address] [address]  Phone: [phone]  Fax: [phone]  ______________________________________________________________________    Physical Exam   Vital Signs: Temp: 98  F (36.7  C) Temp src: Oral BP: 136/64 Pulse: 91   Resp: 20 SpO2: 95 % O2 Device: None (Room air) Oxygen Delivery: 2 LPM  Weight: 265 lbs 0 oz  No apparent distress       Primary Care Physician   Soto Gaston    Discharge Orders      Primary Care - Care Coordination Referral      Reason for your hospital stay    S/p hip repair     When to call - Contact Surgeon Team    You may experience symptoms that require follow-up before your scheduled appointment. Refer to the \"Stoplight Tool\" for instructions on when to contact your Surgeon Team if you are concerned about pain control, blood clots, constipation, or if you are unable to urinate.     When to " call - Reach out to Urgent Care    If you are not able to reach your Surgeon Team and you need immediate care, go to the Orthopedic Walk-in Clinic or Urgent Care at your Surgeon's office.  Do NOT go to the Emergency Room unless you have shortness of breath, chest pain, or other signs of a medical emergency.     When to call - Reasons to Call 911    Call 911 immediately if you experience sudden-onset chest pain, arm weakness/numbness, slurred speech, or shortness of breath     Discharge Instruction - Breathing exercises    Perform breathing exercises using your Incentive Spirometer 10 times per hour while awake for 2 weeks.     Symptoms - Fever Management    A low grade fever can be expected after surgery.  Use acetaminophen (TYLENOL) as needed for fever management.  Contact your Surgeon Team if you have a fever greater than 101.5 F, chills, and/or night sweats.     Symptoms - Constipation management    Constipation (hard, dry bowel movements) is expected after surgery due to the combination of being less active, the anesthetic, and the opioid pain medication.  You can do the following to help reduce constipation:  ~  FLUIDS:  Drink clear liquids (water or Gatorade), or juice (apple/prune).  ~  DIET:  Eat a fiber rich diet.    ~  ACTIVITY:  Get up and move around several times a day.  Increase your activity as you are able.  MEDICATIONS:  Reduce the risk of constipation by starting medications before you are constipated.  You can take Miralax   (1 packet as directed) and/or a stool softener (Senokot 1-2 tablets 1-2 times a day).  If you already have constipation and these medications are not working, you can get magnesium citrate and use as directed.  If you continue to have constipation you can try an over the counter suppository or enema.  Call your Surgeon Team if it has been greater than 3 days since your last bowel movement.     Symptoms - Reduced Urine Output    Changes in the amount of fluids you drank before  and after surgery may result in problems urinating.  It is important to stay well-hydrated after surgery and drink plenty of water. If it has been greater than 8 hours since you have urinated despite drinking plenty of water, call your Surgeon Team.     Activity - Exercises to prevent blood clots    Unless otherwise directed by your Surgeon team, perform the following exercises at least three times per day for the first four weeks after surgery to prevent blood clots in your legs: 1) Point and flex your feet (Ankle Pumps), 2) Move your ankle around in big circles, 3) Wiggle your toes, 4) Walk, even for short distances, several times a day, will help decrease the risk of blood clots.     Comfort and Pain Management - Pain after Surgery    Pain after surgery is normal and expected.  You will have some amount of pain for several weeks after surgery.  Your pain will improve with time.  There are several things you can do to help reduce your pain including: rest, ice, elevation, and using pain medications as needed. Contact your Surgeon Team if you have pain that persists or worsens after surgery despite rest, ice, elevation, and taking your medication(s) as prescribed. Contact your Surgeon Team if you have new numbness, tingling, or weakness in your operative extremity.     Comfort and Pain Management - Swelling after Surgery    Swelling and/or bruising of the surgical extremity is common and may persist for several months after surgery. In addition to frequent icing and elevation, gentle compressive support with an ACE wrap or tubigrip may help with swelling. Apply compression regularly, removing at least twice daily to perform skin checks. Contact your Surgeon Team if your swelling increases and is NOT associated with an increase in your activity level, or if your swelling increases and is associated with redness and pain.     Comfort and Pain Management - LOWER Extremity Elevation    Swelling is expected for several  "months after surgery. This type of swelling is usually associated with gravity and activity, and can be improved with elevation.   The best way to do this is to get your \"toes above your nose\" by laying down and placing several pillows lengthwise under your calf and heel. When elevating your leg keep your knee completely straight. Perform this elevation as often as possible especially for the first two weeks after surgery.     Comfort and Pain Management - Cold therapy    Ice can be used to control swelling and discomfort after surgery. Place a thin towel over your operative site and apply the ice pack overtop. Leave ice pack in place for 20 minutes, then remove for 20 minutes. Repeat this 20 minutes on/20 minutes off routine as often as tolerated.     Medication Instructions - Acetaminophen (TYLENOL) Instructions    You were discharged with acetaminophen (TYLENOL) for pain management after surgery. Acetaminophen most effectively manages pain symptoms when it is taken on a schedule without missing doses (every four, six, or eight hours). Your Provider will prescribe a safe daily dose between 3000 - 4000 mg.  Do NOT exceed this daily dose. Most patients use acetaminophen for pain control for the first four weeks after surgery.  You can wean from this medication as your pain decreases.     Follow Up Care    Please follow-up with Dr. Talavera's team in 2 weeks at [facility]. Call our scheduling line at [phone] to make an appointment, if you do not already have one scheduled.     Activity    Activity as tolerated     Return to Driving    Return to driving - Driving is NOT permitted until directed by your provider. Under no circumstance are you permitted to drive while using narcotic pain medications.     Weight bearing as tolerated    Weight bearing as tolerated on your operative extremity.     Dressing / Wound Care - Wound    You have a clean dressing on your surgical wound. Dressing change instructions as " follows: dressing will be removed at your follow-up appointment. Contact your Surgeon Team if you have increased redness, warmth around the surgical wound, and/or drainage from the surgical wound.     Dressing / Wound care - Shower with wound/dressing covered    You must COVER your dressing or incision with saran wrap (or any other non-permeable covering) to allow the incision to remain dry while showering.  You may shower 2 days after surgery as long as the surgical wound stays dry. Continue to cover your dressing or incision for showering until your first office visit.  You are strictly prohibited from soaking or submerging the surgical wound underwater.     Dressing / Wound Care - NO Tub Bathing    Tub bathing, swimming, or any other activities that will cause your incision to be submerged in water should be avoided until allowed by your Surgeon.     Medication instructions -  Anticoagulation - aspirin    Take the aspirin as prescribed for a total of four weeks after surgery.  This is given to help minimize your risk of blood clot.     Opioid Instructions (Greater than or equal to 65 years)    You were discharged with an opioid medication (hydromorphone, oxycodone, hydrocodone, or tramadol). This medication should only be taken for breakthrough pain that is not controlled with acetaminophen (TYLENOL). If you rate your pain less than 3 you do not need this medication. Pain rating 0-3: You do not need this medication Pain rating 4-6: Take 1/2 tablet every 4-6 hours as needed Pain rating 7-10: Take 1 tablet every 4-6 hours as needed Do not exceed 4 tablets per day     Medication Instructions - Opioids - Tapering Instructions    In the first three days following surgery, your symptoms may warrant use of the narcotic pain medication every four to six hours as prescribed. This is normal. As your pain symptoms improve, focus your efforts on decreasing (tapering) use of narcotic medications. The most successful tapering  strategy is to first, decrease the number of tablets you take every 4-6 hours to the minimum prescribed. Then, increase the amount of time between doses. For example: First, taper to   or 1 tablet every 4-6 hours. Then, taper to   or 1 tablet every 6-8 hours. Then, taper to   or 1 tablet every 8-10 hours. Then, taper to   or 1 tablet every 10-12 hours. Then, taper to   or 1 tablet at bedtime. The bedtime dose can help with comfort during sleep and is typically the last dose to be discontinued after surgery.     No precautions    No precautions directed by your Provider.  You may perform range of motion activities as tolerated.     General info for SNF    Length of Stay Estimate: Short Term Care: Estimated # of Days <30  Condition at Discharge: Improving  Level of care:skilled   Rehabilitation Potential: Good  Admission H&P remains valid and up-to-date: Yes  Recent Chemotherapy: N/A  Use Nursing Home Standing Orders: Yes     Mantoux instructions    Give two-step Mantoux (PPD) Per Facility Policy Yes     Follow Up and recommended labs and tests    Follow up with Nursing home physician.  No follow up labs or test are needed.  Follow-up with orthopedics as recommended     Reason for your hospital stay    Right hip fracture     Glucose monitor nursing POCT    Before meals and at bedtime.  Patient has insulin pump and manages his own insulin and blood sugars.     Allison catheter    To straight gravity drainage. Change catheter every 2 weeks and PRN for leaking or decreased urine output with signs of bladder distention. DO NOT change catheter without a specific Provider order IF diagnosis of benign prostatic hypertrophy (BPH), neurogenic bladder, or other urological conditions.  Recommend trial of voiding in 1 week starting from 9/15/2024.     Activity - Up ad garrett    Please see orthopedic directions regarding activity     Weight bearing status    Please see orthopedic directions regarding weightbearing     Physical Therapy  Adult Consult    Evaluate and treat as clinically indicated.    Reason: Right hip fracture     Occupational Therapy Adult Consult    Evaluate and treat as clinically indicated.    Reason: Right hip fracture     Fall precautions     Crutches DME    DME Documentation: Describe the reason for need to support medical necessity: Impaired gait status post hip surgery. I, the undersigned, certify that the above prescribed supplies are medically necessary for this patient and is both reasonable and necessary in reference to accepted standards of medical practice in the treatment of this patient's condition and is not prescribed as a convenience.     Cane DME    DME Documentation: Describe the reason for need to support medical necessity: Impaired gait status post hip surgery. I, the undersigned, certify that the above prescribed supplies are medically necessary for this patient and is both reasonable and necessary in reference to accepted standards of medical practice in the treatment of this patient's condition and is not prescribed as a convenience.     Walker DME    DME Documentation: Describe the reason for need to support medical necessity: Impaired gait status post hip surgery. I, the undersigned, certify that the above prescribed supplies are medically necessary for this patient and is both reasonable and necessary in reference to accepted standards of medical practice in the treatment of this patient's condition and is not prescribed as a convenience.     Discharge Instruction - Regular Diet Adult    Return to your pre-surgery diet unless instructed otherwise     Diet    Follow this diet upon discharge: Moderate Consistent Carb (60 g CHO per Meal) Diet       Significant Results and Procedures   Results for orders placed or performed during the hospital encounter of 09/14/24   XR Pelvis and Hip Right 2 Views    Narrative    EXAM: XR PELVIS AND HIP RIGHT 2 VIEWS  LOCATION: [facility]  DATE:  "9/14/2024    INDICATION: Fall, hip pain  COMPARISON: None.      Impression    IMPRESSION: Intertrochanteric fracture right hip. No dislocation. Degenerative change at both hip joints. Left hip negative for fracture. Pelvis negative for fracture.   XR Chest 1 View    Narrative    EXAM: XR CHEST 1 VIEW  LOCATION: [facility]  DATE: 9/14/2024    INDICATION: Hip Fracture  COMPARISON: None.      Impression    IMPRESSION: No focal airspace opacities, pleural effusion or pneumothorax. No pulmonary edema. The cardiac and mediastinal silhouettes are normal.   POC US Guidance Needle Placement    Narrative    Ultrasound was performed as guidance to an anesthesia procedure.  Click   \"PACS images\" hyperlink below to view any stored images.  For specific   procedure details, view procedure note authored by anesthesia.   XR Femur Port Right 2 Views    Narrative    EXAM: XR FEMUR PORT RIGHT 2 VIEWS  LOCATION: [facility]  DATE: 9/14/2024    INDICATION: Status post Hip surgery  COMPARISON: 9/14/2024.      Impression    IMPRESSION: Interval internal fixation of the proximal right femur with an IM nail and proximal and distal screws. No evidence of immediate complication. Anatomic fracture position and alignment. Postoperative air in the soft tissues. Degenerative   arthritis in the right hip.   XR Surgery JOSE RAFAEL L/T 5 Min Fluoro    Narrative    This exam was marked as non-reportable because it will not be read by a   radiologist or a [facility] non-radiologist provider.         US Lower Extremity Venous Duplex Bilateral    Narrative    EXAM: US LOWER EXTREMITY VENOUS DUPLEX BILATERAL  LOCATION: [facility]  DATE: 9/15/2024    INDICATION: EDEMA  COMPARISON: None.  TECHNIQUE: Venous Duplex ultrasound of bilateral lower extremities with and without compression, augmentation and duplex. Color flow and spectral Doppler with waveform analysis performed.    FINDINGS: Exam " includes the common femoral, femoral, popliteal veins as well as segmentally visualized deep calf veins and greater saphenous vein. Limited visualization of the calf veins due to body habitus and edema.     RIGHT: No deep vein thrombosis. No superficial thrombophlebitis. No popliteal cyst.    LEFT: No deep vein thrombosis. No superficial thrombophlebitis. No popliteal cyst.      Impression    IMPRESSION:  1.  No deep venous thrombosis in the bilateral lower extremities.   XR Hip Left 1 View    Narrative    EXAM: XR HIP LEFT 1 VIEW  LOCATION: [facility]  DATE: 2024    INDICATION: left hip pain  r o fracture  COMPARISON: 2024      Impression    IMPRESSION: Normal alignment. No acute fracture. Moderate degenerative arthritis left hip joint. If symptoms persist or there is high clinical suspicion, consider additional evaluation with lateral x-ray left hip or CT or MRI.   Echocardiogram Complete     Value    LVEF  60-65%    Narrative    [id number]  [id number]  [id number]  [id number]^[place] [address]     Name: MILAGRO HORN  MRN: [id number]  : 1950  Study Date: 2024 12:12 PM  Age: 74 yrs  Gender: Male  Patient Location: [facility]  Reason For Study: Abn EKG  Ordering Physician: DEBBI BANGURA  Performed By: BP     BSA: 2.4 m2  Height: 73 in  Weight: 256 lb  HR: 89  ______________________________________________________________________________  Procedure  Complete Portable Echo Adult. Definity (NDC #86611-461) given intravenously.  Technically difficult study.Extremely difficult acoustic windows despite the  use of contrast for endcardial border definition. Technically difficult,  suboptimal study. No hemodynamically significant valvular abnormalities on 2D  or color flow imaging.  ______________________________________________________________________________  Interpretation Summary     Technically difficult, suboptimal  study  The left ventricle is normal in size.  Left ventricular function is normal.The ejection fraction is 60-65%.  No hemodynamically significant valvular abnormalities on 2D or color flow  imaging.  ______________________________________________________________________________  Left Ventricle  The left ventricle is normal in size. Left ventricular function is normal.The  ejection fraction is 60-65%. Left ventricular diastolic function is normal. No  regional wall motion abnormalities noted.     Right Ventricle  The right ventricle is not well visualized.     Atria  Normal left atrial size. Right atrium not well visualized. There is no color  Doppler evidence of an atrial shunt.     Mitral Valve  The mitral valve is not well visualized. There is no mitral regurgitation  noted. There is no mitral valve stenosis.     Tricuspid Valve  The tricuspid valve is not well visualized. Right ventricular systolic  pressure could not be approximated due to inadequate tricuspid regurgitation.  This degree of valvular regurgitation is within normal limits.     Aortic Valve  The aortic valve is not well visualized. No aortic regurgitation is present.  No hemodynamically significant valvular aortic stenosis.     Pulmonic Valve  The pulmonic valve is not well visualized.     Vessels  The aorta root cannot be assessed.     Pericardium  There is no pericardial effusion.     ______________________________________________________________________________  MMode/2D Measurements & Calculations  IVSd: 1.2 cm  LVIDd: 5.0 cm  LVIDs: 3.5 cm  LVPWd: 1.2 cm     FS: 29.3 %  LV mass(C)d: 224.9 grams  LV mass(C)dI: 94.1 grams/m2  Ao root diam: 3.8 cm  LVOT diam: 1.6 cm  LVOT area: 2.0 cm2  Ao root diam index Ht(cm/m): 2.0  Ao root diam index BSA (cm/m2): 1.6  RWT: 0.46  TAPSE: 2.8 cm     Doppler Measurements & Calculations  MV E max elian: 57.6 cm/sec  MV A max elian: 87.5 cm/sec  MV E/A: 0.66  MV dec time: 0.62 sec  Ao V2 max: 164.0 cm/sec  Ao max PG:  11.0 mmHg  Ao V2 mean: 119.0 cm/sec  Ao mean P.0 mmHg  Ao V2 VTI: 29.2 cm  BHAVNA(I,D): 1.4 cm2  BHAVNA(V,D): 1.5 cm2  LV V1 max P.7 mmHg  LV V1 max: 119.0 cm/sec  LV V1 VTI: 20.7 cm  SV(LVOT): 41.6 ml  SI(LVOT): 17.4 ml/m2  PA acc time: 0.10 sec     AV Tariq Ratio (DI): 0.73  BHAVNA Index (cm2/m2): 0.60  E/E': 9.9  E/E' av.1  Lateral E/e': 18.2  Medial E/e': 10.0  Peak E' Tariq: 5.8 cm/sec  RV S Tariq: 15.2 cm/sec     ______________________________________________________________________________  Report approved by: Flory Huddleston 2024 01:23 PM             Discharge Medications   Current Discharge Medication List        START taking these medications    Details   acetaminophen (TYLENOL) 325 MG tablet Take 3 tablets (975 mg) by mouth every 8 hours.  Qty: 100 tablet, Refills: 0    Associated Diagnoses: Hip fracture, right, closed, initial encounter (H)      oxyCODONE (ROXICODONE) 5 MG tablet Take 1 tablet (5 mg) by mouth every 4 hours as needed for moderate pain.  Qty: 26 tablet, Refills: 0    Associated Diagnoses: Hip fracture, right, closed, initial encounter (H)      senna-docusate (SENOKOT-S/PERICOLACE) 8.6-50 MG tablet Take 2 tablets by mouth 2 times daily as needed for constipation.  Qty: 60 tablet, Refills: 0    Associated Diagnoses: Hip fracture, right, closed, initial encounter (H)           CONTINUE these medications which have CHANGED    Details   aspirin 81 MG EC tablet Take 1 tablet (81 mg) by mouth 2 times daily.  Qty: 60 tablet, Refills: 0    Associated Diagnoses: Hip fracture, right, closed, initial encounter (H)           CONTINUE these medications which have NOT CHANGED    Details   Barberry-Oreg Grape-Goldenseal (BERBERINE COMPLEX PO) Take 1 capsule by mouth daily      Cholecalciferol (VITAMIN D-3) 125 MCG (5000 UT) TABS Take 5,000 Units by mouth daily       HERBALS Take 1 each by mouth daily (Mushroom complex 6)      insulin lispro (HUMALOG VIAL) 100 UNIT/ML vial Inject subcutaneously  SHERIN Valdez 3 times daily (before meals). INJECT 60 UNITS UNDER THE SKIN AS DIRECTED. BOLUS UP TO 30 UNITS DAILY.  UNITS DAILY. USE WITH INSULIN PUMP      INSULIN PUMP - OUTPATIENT Per Endocrinology  [employer]      Multiple Vitamins-Minerals (MULTIVITAMIN MEN) TABS Take 1 tablet by mouth daily      polyethylene glycol (MIRALAX) 17 GM/Dose powder Take 1 capful by mouth daily as needed for constipation      pravastatin (PRAVACHOL) 40 MG tablet Take 40 mg by mouth daily.      probiotic CAPS Take 1 capsule by mouth daily (Zenwise- pre-and pro-biotic)      blood glucose (NO BRAND SPECIFIED) test strip Use to test blood sugar 6-7 times daily or as directed.           Allergies   Allergies   Allergen Reactions    Ammonium Lac Itching     Legs very red, excessive itching    Amoxicillin Nausea     Other reaction(s): nausea    Erythromycin Nausea    Naprosyn [Naproxen] Unknown     9/14/24 taking aspirin 81 mg pta/given inpt at [facility]

## 2024-09-17 NOTE — PLAN OF CARE
Problem: Adult Inpatient Plan of Care  Goal: Plan of Care Review  Description: The Plan of Care Review/Shift note should be completed every shift.  The Outcome Evaluation is a brief statement about your assessment that the patient is improving, declining, or no change.  This information will be displayed automatically on your shift  note.  Outcome: Progressing     Problem: Mobility Impairment  Goal: Optimal Mobility  Outcome: Not Progressing  Intervention: Optimize Mobility  Recent Flowsheet Documentation  Taken 9/16/2024 1900 by Анна Thomas RN  Activity Management: (Pt ref to sit at EOB or get up to chair) patient refuses activity  Taken 9/16/2024 1613 by Анна Thomas, RN  Assistive Device Utilized:   gait belt   walker  Activity Management: activity encouraged     Problem: Urinary Retention  Goal: Effective Urinary Elimination  Outcome: Not Progressing  Intervention: Promote Effective Urine Elimination  Recent Flowsheet Documentation  Taken 9/16/2024 1613 by Анна Thomas, RN  Bowel Function Promotion: prompt response to urge promoted   Goal Outcome Evaluation:       AxO4. Bilateral hip pain controlled with repositioning, PRN oxy, ice. 2 hip dressings with dried drainage & intact. Pt did not want to get up after PT worked with pt. Explained importance of activity. No BM for 3 days, + BS & passing gas, no results yet. Allison in place, clear yellow output.

## 2024-09-17 NOTE — PLAN OF CARE
Problem: Adult Inpatient Plan of Care  Goal: Plan of Care Review  Description: The Plan of Care Review/Shift note should be completed every shift.  The Outcome Evaluation is a brief statement about your assessment that the patient is improving, declining, or no change.  This information will be displayed automatically on your shift  note.  9/17/2024 0516 by Анна Thomas RN  Outcome: Progressing  9/16/2024 2223 by Анна Thomas RN  Outcome: Progressing     Problem: Mobility Impairment  Goal: Optimal Mobility  9/17/2024 0516 by Анна Thomas RN  Outcome: Not Progressing  9/16/2024 2223 by Анна Thomas RN  Outcome: Not Progressing  Intervention: Optimize Mobility  Recent Flowsheet Documentation  Taken 9/17/2024 0436 by Анна Thomas RN  Positioning/Transfer Devices:   pillows   in use  Taken 9/17/2024 0020 by Анна Thomas RN  Assistive Device Utilized:   gait belt   walker  Activity Management: activity encouraged  Taken 9/16/2024 1900 by Анна Thomas RN  Activity Management: (Pt ref to sit at EOB or get up to chair) patient refuses activity  Taken 9/16/2024 1613 by Анна Thomas RN  Assistive Device Utilized:   gait belt   walker  Activity Management: activity encouraged     Problem: Surgery Nonspecified  Goal: Optimal Pain Control and Function  9/17/2024 0516 by Анна Thomas RN  Outcome: Progressing  9/16/2024 2223 by Анна Thomas RN  Outcome: Progressing  Intervention: Prevent or Manage Pain  Recent Flowsheet Documentation  Taken 9/16/2024 1611 by Анна Thomas RN  Pain Management Interventions:   cold applied   emotional support   pillow support provided   repositioned   Goal Outcome Evaluation:       SpO2 dropped to 86-88% on RA, placed on 2L O2 overnight. Allison draining yellow urine. Pain controlled over night with ice & repositioning. Dressings intact.

## 2024-09-17 NOTE — PROGRESS NOTES
Care Management Discharge Note    Discharge Date: 09/17/2024       Discharge Disposition: Transitional Care    Discharge Services: None    Discharge DME:  (per treatment team)    Discharge Transportation: health plan transportation    Private pay costs discussed: transportation costs    Does the patient's insurance plan have a 3 day qualifying hospital stay waiver?  No    PAS Confirmation Code: [id number]  Patient/family educated on Medicare website which has current facility and service quality ratings: yes    Education Provided on the Discharge Plan: Yes (per treatment team)  Persons Notified of Discharge Plans: Pt  Patient/Family in Agreement with the Plan: yes    Handoff Referral Completed: Yes, [facility] PCP: Internal handoff referral completed    Additional Information:  Pt to discharge to [facility] today 9/17 via [facility] wheelchair transport between 3:10-3:50PM. SW confirmed discharge plan with facility and Pt.     TRENT Del Cid

## 2024-09-18 ENCOUNTER — DOCUMENTATION ONLY (OUTPATIENT)
Dept: GERIATRICS | Facility: CLINIC | Age: 74
End: 2024-09-18
Payer: COMMERCIAL

## 2024-09-18 PROCEDURE — 36415 COLL VENOUS BLD VENIPUNCTURE: CPT | Mod: ORL | Performed by: FAMILY MEDICINE

## 2024-09-18 PROCEDURE — P9603 ONE-WAY ALLOW PRORATED MILES: HCPCS | Mod: ORL | Performed by: FAMILY MEDICINE

## 2024-09-18 PROCEDURE — 86481 TB AG RESPONSE T-CELL SUSP: CPT | Mod: ORL | Performed by: FAMILY MEDICINE

## 2024-09-18 NOTE — PROGRESS NOTES
[facility] GERIATRICS    PRIMARY CARE PROVIDER AND CLINIC:  Soto Gaston MD, [address] [address] / [facility] [id number]  Chief Complaint   Patient presents with    Hospital F/U      [place] Medical Record Number:  [id number]  Place of Service where encounter took place:  [facility] ([facility]) [[id number]]    Cam Hernandez  is a 74 year old  (1950), admitted to the above facility from  [facility]. Hospital stay 9/14/24 through 9/17/24.  HPI: PMH  of type 1 diabetes, neuropathy, HLD, NBA with CPAP use, presented to emergency room with mechanical fall that led to right intertrochanteric  hip fracture post fall.  Orthopedic procedure completed on 9/14/2024 right hip trochanteric nailing with cephalomedullary device.  He transferred to U for rehab and  medical management.    History obtained from: facility chart records, facility staff, patient report, [facility] chart review, and Care Everywhere [facility] chart review        is seen in his room lying in bed . He reports he has been doing ok. He  has been constipated for few days now . He is passing gas and eating drinking ok .  He had small bowel movement today on the bedpan and it was uncomfortable .bowel med will be ordered as needed .He is bed bound and using mechanical left for transfer at this time.  Orthopedic follow-up in 2-week with Dr. [facility] orth[facility].  He denies any other acute issues today he manages his insulin pump independently.    CODE STATUS/ADVANCE DIRECTIVES DISCUSSION:  No CPR- Do NOT Intubate  CPR/Full code   ALLERGIES:   Allergies   Allergen Reactions    Ammonium Lac Itching     Legs very red, excessive itching    Amoxicillin Nausea     Other reaction(s): nausea    Erythromycin Nausea    Naprosyn [Naproxen] Unknown     9/14/24 taking aspirin 81 mg pta/given inpt at [facility]      PAST MEDICAL HISTORY: History reviewed. No pertinent past medical history.   PAST SURGICAL HISTORY:   has a past surgical  history that includes Open reduction internal fixation hip nailing (Right, 9/14/2024).  FAMILY HISTORY: family history is not on file.  SOCIAL HISTORY:   reports that he quit smoking about 11 years ago. His smoking use included cigars. He has never used smokeless tobacco.  Patient's living condition: lives with family, Son     Post Discharge Medication Reconciliation Status:   MED REC REQUIRED{  Post Medication Reconciliation Status: discharge medications reconciled and changed, per note/orders       Current Outpatient Medications   Medication Sig Dispense Refill    acetaminophen (TYLENOL) 325 MG tablet Take 3 tablets (975 mg) by mouth every 8 hours. 100 tablet 0    aspirin 81 MG EC tablet Take 1 tablet (81 mg) by mouth 2 times daily. 60 tablet 0    Barberry-Oreg Grape-Goldenseal (BERBERINE COMPLEX PO) Take 1 capsule by mouth daily      blood glucose (NO BRAND SPECIFIED) test strip Use to test blood sugar 6-7 times daily or as directed.      Cholecalciferol (VITAMIN D-3) 125 MCG (5000 UT) TABS Take 5,000 Units by mouth daily       diclofenac (VOLTAREN) 1 % topical gel Apply 4 g topically 4 times daily. Apply to the LEFT hip/groin area (not right) 50 g 0    HERBALS Take 1 each by mouth daily (Mushroom complex 6)      insulin lispro (HUMALOG VIAL) 100 UNIT/ML vial Inject subcutaneously 3 times daily (before meals). INJECT 60 UNITS UNDER THE SKIN AS DIRECTED. BOLUS UP TO 30 UNITS DAILY.  UNITS DAILY. USE WITH INSULIN PUMP      INSULIN PUMP - OUTPATIENT Per Endocrinology  Medtronic      Multiple Vitamins-Minerals (MULTIVITAMIN MEN) TABS Take 1 tablet by mouth daily      oxyCODONE (ROXICODONE) 5 MG tablet Take 1 tablet (5 mg) by mouth every 4 hours as needed for moderate pain. 26 tablet 0    polyethylene glycol (MIRALAX) 17 GM/Dose powder Take 1 capful. by mouth daily as needed.      pravastatin (PRAVACHOL) 40 MG tablet Take 40 mg by mouth daily.      probiotic CAPS Take 1 capsule by mouth daily (Zenwise- pre-and  "pro-biotic)      senna-docusate (SENOKOT-S/PERICOLACE) 8.6-50 MG tablet Take 2 tablets by mouth 2 times daily as needed for constipation. 60 tablet 0     No current facility-administered medications for this visit.       ROS:  4 point ROS including Respiratory, CV, GI and , other than that noted in the HPI,  is negative    Vitals:  /63   Pulse 96   Temp 97.3  F (36.3  C)   Resp 17   Ht 1.854 m (6' 1\")   Wt 120.2 kg (265 lb)   SpO2 93%   BMI 34.96 kg/m    Exam:  GENERAL APPEARANCE:  Alert, in no distress, morbidly obese  NECK:  No adenopathy,masses or thyromegaly  RESP:  respiratory effort and palpation of chest normal, lungs clear to auscultation , no respiratory distress  CV:  regular rate and rhythm, no murmur, rub, or gallop, trace edema BLE  ABDOMEN:  normal bowel sounds, soft, nontender, no hepatosplenomegaly or other masses, reports constipation, no guarding or rebound, bowel sounds normal  :    Allison TOV to start Monday   M/S:   Bed bound/uses lift for transfer  SKIN:  wound healing well, no signs of infection site is covered no sign of infection  NEURO:   Examination of sensation by touch normal  PSYCH:  oriented X 3    Lab/Diagnostic data:  Most Recent 3 CBC's:  Recent Labs   Lab Test 09/20/24  0609 09/16/24  0608 09/15/24  0547 09/14/24  1036   WBC 7.6  --  13.9* 14.7*   HGB 11.4* 11.1* 13.3 14.6   MCV 95  --  92 91     --  287 300     Most Recent 3 BMP's:  Recent Labs   Lab Test 09/20/24  0609 09/17/24  1210 09/17/24  0750 09/16/24  0748 09/16/24  0608 09/15/24  0710 09/15/24  0547 09/14/24 2033 09/14/24  1036     --   --   --   --   --  136  --  142   POTASSIUM 4.6  --   --   --   --   --  4.6  --  4.2   CHLORIDE 104  --   --   --   --   --  101  --  106   CO2 29  --   --   --   --   --  23  --  27   BUN 20.9  --   --   --   --   --  27.6*  --  22.6   CR 1.13  --   --   --  1.33*  --  1.39*  --  1.17   ANIONGAP 7  --   --   --   --   --  12  --  9   ETHEL 8.6*  --   --   --   " --   --  8.6*  --  8.7*   * 118* 139*   < >  --    < > 200*   < > 142*    < > = values in this interval not displayed.       ASSESSMENT/PLAN:  (S72.001A) Hip fracture, right, closed,  (primary encounter diagnosis)  (M25.551) Right hip pain  (M16.51) Post-traumatic osteoarthritis of   (R53.81) Physical deconditioning  Comment: Acute on chronic right intertrochanteric hip fracture and underwent ORIF on 9/14/24  Plan:   - Pain managed with  APAP/Oxycodone   -Follow-up with [facility] on 10/2/2024  -PT/OT eval and treat  - ASA for anticoagulation BID for 30 days     (M34.9) Systemic sclerosis (H)  - continue with Voltaren gel QID      (E66.01) Morbid obesity (H)  Comment: Acute on chronic he has been working on small portion meals   Plan: RD to see and provide diety modification teaching     (E10.69) Type 1 diabetes mellitus with other specified complication (H)  Comment: no epidote of hyperglycinemia reported BS controlled  Last A1C 6.1  Plan:   - Continue Continues insuline use via self managed pump    (D72.829) Leukocytosis, unspecified type  Comment: chronic stable last WBC has improved 7.6 during the hospital stay ranging 13-14.  Plan:   - Periodic lab to assess   - Monitor for any sign and symptoms on infections     (R33.9) Urinary retention  Comment: Acute on chronic Void trail failed in hospital will attempt again on 9/22/24 then referral to urology per protocol.  Plan:   - Remove moore on 9/23/24  - PVR order fro 48hr       (E78.5) Hyperlipidemia LDL goal <160  - Continue Pravastatin 40mg daily     # Slow transit constipation  - Miralax 17 grams daily  - Senna 2 tabs PO BID PRN  - continue probiotics     Orders:  - Pull Moore 9/22 with PVR order   - CBC w/Diff BMP due 9/20/24        Total time spent with patient visit at the [facility] nursing facility was 55 including patient visit, review of past records, and update plan with staff .     Electronically signed by:RUTH Nunez CNP

## 2024-09-19 ENCOUNTER — TRANSITIONAL CARE UNIT VISIT (OUTPATIENT)
Dept: GERIATRICS | Facility: CLINIC | Age: 74
End: 2024-09-19
Payer: COMMERCIAL

## 2024-09-19 ENCOUNTER — LAB REQUISITION (OUTPATIENT)
Dept: LAB | Facility: CLINIC | Age: 74
End: 2024-09-19
Payer: COMMERCIAL

## 2024-09-19 VITALS
SYSTOLIC BLOOD PRESSURE: 137 MMHG | HEART RATE: 96 BPM | OXYGEN SATURATION: 93 % | DIASTOLIC BLOOD PRESSURE: 63 MMHG | HEIGHT: 73 IN | TEMPERATURE: 97.3 F | BODY MASS INDEX: 35.12 KG/M2 | WEIGHT: 265 LBS | RESPIRATION RATE: 17 BRPM

## 2024-09-19 DIAGNOSIS — D72.829 LEUKOCYTOSIS, UNSPECIFIED TYPE: ICD-10-CM

## 2024-09-19 DIAGNOSIS — E10.69 TYPE 1 DIABETES MELLITUS WITH OTHER SPECIFIED COMPLICATION (H): ICD-10-CM

## 2024-09-19 DIAGNOSIS — E78.5 HYPERLIPIDEMIA LDL GOAL <160: ICD-10-CM

## 2024-09-19 DIAGNOSIS — M16.51 POST-TRAUMATIC OSTEOARTHRITIS OF RIGHT HIP: ICD-10-CM

## 2024-09-19 DIAGNOSIS — E66.01 MORBID OBESITY (H): ICD-10-CM

## 2024-09-19 DIAGNOSIS — R33.9 URINARY RETENTION: ICD-10-CM

## 2024-09-19 DIAGNOSIS — N17.9 ACUTE KIDNEY FAILURE, UNSPECIFIED (H): ICD-10-CM

## 2024-09-19 DIAGNOSIS — S72.001A HIP FRACTURE, RIGHT, CLOSED, INITIAL ENCOUNTER (H): Primary | ICD-10-CM

## 2024-09-19 DIAGNOSIS — K59.01 SLOW TRANSIT CONSTIPATION: ICD-10-CM

## 2024-09-19 DIAGNOSIS — M34.9 SYSTEMIC SCLEROSIS (H): ICD-10-CM

## 2024-09-19 DIAGNOSIS — M25.551 RIGHT HIP PAIN: ICD-10-CM

## 2024-09-19 DIAGNOSIS — R53.81 PHYSICAL DECONDITIONING: ICD-10-CM

## 2024-09-19 DIAGNOSIS — D72.829 ELEVATED WHITE BLOOD CELL COUNT, UNSPECIFIED: ICD-10-CM

## 2024-09-19 LAB
ATRIAL RATE - MUSE: 85 BPM
DIASTOLIC BLOOD PRESSURE - MUSE: 72 MMHG
GAMMA INTERFERON BACKGROUND BLD IA-ACNC: 0.01 IU/ML
INTERPRETATION ECG - MUSE: NORMAL
M TB IFN-G BLD-IMP: NEGATIVE
M TB IFN-G CD4+ BCKGRND COR BLD-ACNC: 9.99 IU/ML
MITOGEN IGNF BCKGRD COR BLD-ACNC: 0 IU/ML
MITOGEN IGNF BCKGRD COR BLD-ACNC: 0.01 IU/ML
P AXIS - MUSE: 67 DEGREES
PR INTERVAL - MUSE: 182 MS
QRS DURATION - MUSE: 144 MS
QT - MUSE: 420 MS
QTC - MUSE: 499 MS
QUANTIFERON MITOGEN: 10 IU/ML
QUANTIFERON NIL TUBE: 0.01 IU/ML
QUANTIFERON TB1 TUBE: 0.02 IU/ML
QUANTIFERON TB2 TUBE: 0.01
R AXIS - MUSE: 254 DEGREES
SYSTOLIC BLOOD PRESSURE - MUSE: 170 MMHG
T AXIS - MUSE: 52 DEGREES
VENTRICULAR RATE- MUSE: 85 BPM

## 2024-09-19 PROCEDURE — 99310 SBSQ NF CARE HIGH MDM 45: CPT

## 2024-09-19 NOTE — LETTER
9/19/2024      Cam Hernandez  [address] [address]        [facility] GERIATRICS    PRIMARY CARE PROVIDER AND CLINIC:  Soto Gaston MD, [address] [address] / [facility] [address]  Chief Complaint   Patient presents with     Hospital F/U      [facility] Medical Record Number:  [id number]  Place of Service where encounter took place:  [facility] ([facility]) [[id number]]    Cam Hernandez  is a 74 year old  (1950), admitted to the above facility from  [facility]. Hospital stay 9/14/24 through 9/17/24.  HPI: PMH  of type 1 diabetes, neuropathy, HLD, NBA with CPAP use, presented to emergency room with mechanical fall that led to right intertrochanteric  hip fracture post fall.  Orthopedic procedure completed on 9/14/2024 right hip trochanteric nailing with cephalomedullary device.  He transferred to U for rehab and  medical management.    History obtained from: facility chart records, facility staff, patient report, [facility] chart review, and Care Everywhere [facility] chart review        is seen in his room lying in bed . He reports he has been doing ok. He  has been constipated for few days now . He is passing gas and eating drinking ok .  He had small bowel movement today on the bedpan and it was uncomfortable .bowel med will be ordered as needed .He is bed bound and using mechanical left for transfer at this time.  Orthopedic follow-up in 2-week with Dr. [facility] orthopedics.  He denies any other acute issues today he manages his insulin pump independently.    CODE STATUS/ADVANCE DIRECTIVES DISCUSSION:  Full Code  CPR/Full code   ALLERGIES:   Allergies   Allergen Reactions     Ammonium Lac Itching     Legs very red, excessive itching     Amoxicillin Nausea     Other reaction(s): nausea     Erythromycin Nausea     Naprosyn [Naproxen] Unknown     9/14/24 taking aspirin 81 mg pta/given inpt at [facility]      PAST MEDICAL HISTORY: No past medical history on  file.   PAST SURGICAL HISTORY:   has a past surgical history that includes Open reduction internal fixation hip nailing (Right, 9/14/2024).  FAMILY HISTORY: family history is not on file.  SOCIAL HISTORY:   reports that he quit smoking about 11 years ago. His smoking use included cigars. He has never used smokeless tobacco.  Patient's living condition: lives with family, Son     Post Discharge Medication Reconciliation Status:   MED REC REQUIRED{  Post Medication Reconciliation Status: discharge medications reconciled and changed, per note/orders       Current Outpatient Medications   Medication Sig Dispense Refill     acetaminophen (TYLENOL) 325 MG tablet Take 3 tablets (975 mg) by mouth every 8 hours. 100 tablet 0     aspirin 81 MG EC tablet Take 1 tablet (81 mg) by mouth 2 times daily. 60 tablet 0     Barberry-Oreg Grape-Goldenseal (BERBERINE COMPLEX PO) Take 1 capsule by mouth daily       blood glucose (NO BRAND SPECIFIED) test strip Use to test blood sugar 6-7 times daily or as directed.       Cholecalciferol (VITAMIN D-3) 125 MCG (5000 UT) TABS Take 5,000 Units by mouth daily        diclofenac (VOLTAREN) 1 % topical gel Apply 4 g topically 4 times daily. Apply to the LEFT hip/groin area (not right) 50 g 0     HERBALS Take 1 each by mouth daily (Mushroom complex 6)       insulin lispro (HUMALOG VIAL) 100 UNIT/ML vial Inject subcutaneously 3 times daily (before meals). INJECT 60 UNITS UNDER THE SKIN AS DIRECTED. BOLUS UP TO 30 UNITS DAILY.  UNITS DAILY. USE WITH INSULIN PUMP       INSULIN PUMP - OUTPATIENT Per Endocrinology  Medtronic       Multiple Vitamins-Minerals (MULTIVITAMIN MEN) TABS Take 1 tablet by mouth daily       oxyCODONE (ROXICODONE) 5 MG tablet Take 1 tablet (5 mg) by mouth every 4 hours as needed for moderate pain. 26 tablet 0     polyethylene glycol (MIRALAX) 17 GM/Dose powder Take 1 capful. by mouth daily as needed.       pravastatin (PRAVACHOL) 40 MG tablet Take 40 mg by mouth daily.    "    probiotic CAPS Take 1 capsule by mouth daily (Zenwise- pre-and pro-biotic)       senna-docusate (SENOKOT-S/PERICOLACE) 8.6-50 MG tablet Take 2 tablets by mouth 2 times daily as needed for constipation. 60 tablet 0     No current facility-administered medications for this visit.       ROS:  4 point ROS including Respiratory, CV, GI and , other than that noted in the HPI,  is negative    Vitals:  /63   Pulse 96   Temp 97.3  F (36.3  C)   Resp 17   Ht 1.854 m (6' 1\")   Wt 120.2 kg (265 lb)   SpO2 93%   BMI 34.96 kg/m    Exam:  GENERAL APPEARANCE:  Alert, in no distress, morbidly obese  NECK:  No adenopathy,masses or thyromegaly  RESP:  respiratory effort and palpation of chest normal, lungs clear to auscultation , no respiratory distress  CV:  regular rate and rhythm, no murmur, rub, or gallop, trace edema BLE  ABDOMEN:  normal bowel sounds, soft, nontender, no hepatosplenomegaly or other masses, reports constipation, no guarding or rebound, bowel sounds normal  :    Allison TOV to start Monday   M/S:   Bed bound/uses lift for transfer  SKIN:  wound healing well, no signs of infection site is covered no sign of infection  NEURO:   Examination of sensation by touch normal  PSYCH:  oriented X 3    Lab/Diagnostic data:  Most Recent 3 CBC's:  Recent Labs   Lab Test 09/20/24  0609 09/16/24  0608 09/15/24  0547 09/14/24  1036   WBC 7.6  --  13.9* 14.7*   HGB 11.4* 11.1* 13.3 14.6   MCV 95  --  92 91     --  287 300     Most Recent 3 BMP's:  Recent Labs   Lab Test 09/20/24  0609 09/17/24  1210 09/17/24  0750 09/16/24  0748 09/16/24  0608 09/15/24  0710 09/15/24  0547 09/14/24 2033 09/14/24  1036     --   --   --   --   --  136  --  142   POTASSIUM 4.6  --   --   --   --   --  4.6  --  4.2   CHLORIDE 104  --   --   --   --   --  101  --  106   CO2 29  --   --   --   --   --  23  --  27   BUN 20.9  --   --   --   --   --  27.6*  --  22.6   CR 1.13  --   --   --  1.33*  --  1.39*  --  1.17 "   ANIONGAP 7  --   --   --   --   --  12  --  9   ETHEL 8.6*  --   --   --   --   --  8.6*  --  8.7*   * 118* 139*   < >  --    < > 200*   < > 142*    < > = values in this interval not displayed.       ASSESSMENT/PLAN:  (S72.001A) Hip fracture, right, closed,  (primary encounter diagnosis)  (M25.551) Right hip pain  (M16.51) Post-traumatic osteoarthritis of   (R53.81) Physical deconditioning  Comment: Acute on chronic right intertrochanteric hip fracture and underwent ORIF on 9/14/24  Plan:   - Pain managed with  APAP/Oxycodone   -Follow-up with [facility] on 10/2/2024  -PT/OT eval and treat  - ASA for anticoagulation BID for 30 days     (M34.9) Systemic sclerosis (H)  - continue with Voltaren gel QID      (E66.01) Morbid obesity (H)  Comment: Acute on chronic he has been working on small portion meals   Plan: RD to see and provide diety modification teaching     (E10.69) Type 1 diabetes mellitus with other specified complication (H)  Comment: no epidote of hyperglycinemia reported BS controlled  Last A1C 6.1  Plan:   - Continue Continues insuline use via self managed pump    (D72.829) Leukocytosis, unspecified type  Comment: chronic stable last WBC has improved 7.6 during the hospital stay ranging 13-14.  Plan:   - Periodic lab to assess   - Monitor for any sign and symptoms on infections     (R33.9) Urinary retention  Comment: Acute on chronic Void trail failed in hospital will attempt again on 9/22/24 then referral to urology per protocol.  Plan:   - Remove moore on 9/23/24  - PVR order fro 48hr       (E78.5) Hyperlipidemia LDL goal <160  - Continue Pravastatin 40mg daily     # Slow transit constipation  - Miralax 17 grams daily  - Senna 2 tabs PO BID PRN  - continue probiotics     Orders:  - Pull Moore 9/22 with PVR order   - CBC w/Diff BMP due 9/20/24        Total time spent with patient visit at the [facility] nursing facility was 55 including patient visit, review of past records, and update plan  with staff .     Electronically signed by:RUTH Nunez CNP                       Sincerely,        RUTH Nunez CNP

## 2024-09-20 PROBLEM — M34.9 SYSTEMIC SCLEROSIS (H): Status: ACTIVE | Noted: 2024-09-20

## 2024-09-20 PROBLEM — E10.69 TYPE 1 DIABETES MELLITUS WITH OTHER SPECIFIED COMPLICATION (H): Status: ACTIVE | Noted: 2024-09-20

## 2024-09-20 PROBLEM — E66.01 MORBID OBESITY (H): Status: ACTIVE | Noted: 2024-09-20

## 2024-09-20 LAB
ANION GAP SERPL CALCULATED.3IONS-SCNC: 7 MMOL/L (ref 7–15)
BASOPHILS # BLD AUTO: 0.1 10E3/UL (ref 0–0.2)
BASOPHILS NFR BLD AUTO: 1 %
BUN SERPL-MCNC: 20.9 MG/DL (ref 8–23)
CALCIUM SERPL-MCNC: 8.6 MG/DL (ref 8.8–10.4)
CHLORIDE SERPL-SCNC: 104 MMOL/L (ref 98–107)
CREAT SERPL-MCNC: 1.13 MG/DL (ref 0.67–1.17)
EGFRCR SERPLBLD CKD-EPI 2021: 68 ML/MIN/1.73M2
EOSINOPHIL # BLD AUTO: 0.6 10E3/UL (ref 0–0.7)
EOSINOPHIL NFR BLD AUTO: 8 %
ERYTHROCYTE [DISTWIDTH] IN BLOOD BY AUTOMATED COUNT: 13.4 % (ref 10–15)
GLUCOSE SERPL-MCNC: 158 MG/DL (ref 70–99)
HCO3 SERPL-SCNC: 29 MMOL/L (ref 22–29)
HCT VFR BLD AUTO: 36.3 % (ref 40–53)
HGB BLD-MCNC: 11.4 G/DL (ref 13.3–17.7)
IMM GRANULOCYTES # BLD: 0.1 10E3/UL
IMM GRANULOCYTES NFR BLD: 1 %
LYMPHOCYTES # BLD AUTO: 1.1 10E3/UL (ref 0.8–5.3)
LYMPHOCYTES NFR BLD AUTO: 14 %
MCH RBC QN AUTO: 29.9 PG (ref 26.5–33)
MCHC RBC AUTO-ENTMCNC: 31.4 G/DL (ref 31.5–36.5)
MCV RBC AUTO: 95 FL (ref 78–100)
MONOCYTES # BLD AUTO: 0.6 10E3/UL (ref 0–1.3)
MONOCYTES NFR BLD AUTO: 7 %
NEUTROPHILS # BLD AUTO: 5.3 10E3/UL (ref 1.6–8.3)
NEUTROPHILS NFR BLD AUTO: 69 %
NRBC # BLD AUTO: 0 10E3/UL
NRBC BLD AUTO-RTO: 0 /100
PLATELET # BLD AUTO: 338 10E3/UL (ref 150–450)
POTASSIUM SERPL-SCNC: 4.6 MMOL/L (ref 3.4–5.3)
RBC # BLD AUTO: 3.81 10E6/UL (ref 4.4–5.9)
SODIUM SERPL-SCNC: 140 MMOL/L (ref 135–145)
WBC # BLD AUTO: 7.6 10E3/UL (ref 4–11)

## 2024-09-20 PROCEDURE — 80048 BASIC METABOLIC PNL TOTAL CA: CPT | Mod: ORL

## 2024-09-20 PROCEDURE — 85004 AUTOMATED DIFF WBC COUNT: CPT

## 2024-09-20 PROCEDURE — P9603 ONE-WAY ALLOW PRORATED MILES: HCPCS | Mod: ORL

## 2024-09-20 PROCEDURE — 36415 COLL VENOUS BLD VENIPUNCTURE: CPT | Mod: ORL

## 2024-09-20 PROCEDURE — 82947 ASSAY GLUCOSE BLOOD QUANT: CPT

## 2024-09-20 RX ORDER — BISACODYL 10 MG
10 SUPPOSITORY, RECTAL RECTAL DAILY PRN
COMMUNITY
Start: 2024-09-20 | End: 2024-09-20

## 2024-09-23 ENCOUNTER — TRANSITIONAL CARE UNIT VISIT (OUTPATIENT)
Dept: GERIATRICS | Facility: CLINIC | Age: 74
End: 2024-09-23
Payer: COMMERCIAL

## 2024-09-23 VITALS
WEIGHT: 265.6 LBS | OXYGEN SATURATION: 96 % | SYSTOLIC BLOOD PRESSURE: 140 MMHG | DIASTOLIC BLOOD PRESSURE: 80 MMHG | BODY MASS INDEX: 35.2 KG/M2 | HEIGHT: 73 IN | RESPIRATION RATE: 18 BRPM | HEART RATE: 80 BPM | TEMPERATURE: 97.7 F

## 2024-09-23 DIAGNOSIS — R33.9 URINARY RETENTION: ICD-10-CM

## 2024-09-23 DIAGNOSIS — E10.69 TYPE 1 DIABETES MELLITUS WITH OTHER SPECIFIED COMPLICATION (H): ICD-10-CM

## 2024-09-23 DIAGNOSIS — D62 ABLA (ACUTE BLOOD LOSS ANEMIA): ICD-10-CM

## 2024-09-23 DIAGNOSIS — E78.5 HYPERLIPIDEMIA, UNSPECIFIED HYPERLIPIDEMIA TYPE: ICD-10-CM

## 2024-09-23 DIAGNOSIS — N17.9 AKI (ACUTE KIDNEY INJURY) (H): ICD-10-CM

## 2024-09-23 DIAGNOSIS — M25.552 HIP PAIN, LEFT: ICD-10-CM

## 2024-09-23 DIAGNOSIS — S72.001A HIP FRACTURE, RIGHT, CLOSED, INITIAL ENCOUNTER (H): Primary | ICD-10-CM

## 2024-09-23 PROCEDURE — 99305 1ST NF CARE MODERATE MDM 35: CPT | Performed by: FAMILY MEDICINE

## 2024-09-23 NOTE — LETTER
9/23/2024      Cam Hernandez  [address] [address]        [facility] GERIATRICS  [place] Medical Record Number:  [id number]  Place of Service where encounter took place: [facility] ([facility]) [[id number]]  CODE STATUS:   DNR / DNI      Chief Complaint/Reason for Visit:  Chief Complaint   Patient presents with     Hospital F/U       HPI:    Cam Hernandez is a 74 year old male with hx of IDDM presented to the hospital on 9/14/2024 after a fall in which he sustained a right hip fracture as noted below.    [facility]  Hospitalist Discharge Summary    Date of Admission:  9/14/2024  Date of Discharge:  9/17/2024     Hospital Course  74-year-old diabetic on insulin pump with diabetic neuropathy who presented with right hip pain and was found to have right intertrochanteric hip fracture and underwent ORIF on 9/14.  Patient denies any postoperative pain.     Type 1 diabetes on insulin pump  -Blood sugars reasonably well-controlled      ARF  -- Creatinine increased to 1.3  -Creatinine improved with normal saline bolus  --        Creatinine   Date Value Ref Range Status   09/16/2024 1.33 (H) 0.67 - 1.17 mg/dL Final   -- Baseline creatinine of 1.2     Leukocytosis likely reactive  -- Trending down  -- No evidence of DVT        WBC Count   Date Value Ref Range Status   09/15/2024 13.9 (H) 4.0 - 11.0 10e3/uL Final   -- Echo is unremarkable     Left hip pain noted on 9/16  -- x-ray of the left hip which showed degenerative changes  -- No fracture or dislocation  -- Continue physical therapy.     Acute urinary retention  -- Failed weaning trial.  Allison's inserted on 9/15  -- Trial of voiding in 1 week     Other medical conditions include  Hyperlipidemia, obesity, diabetic neuropathy,?Sclerosis     Overall stabilized and discharged to TCU on 9/17/2024 for PT, OT, nursing cares, medical management and monitoring.     Today:  He is doing ok, has WB restriction per ortho. On aspirin for  DVT prevention. Has diabetes, insulin pump. Came to TCU with moore, failed voiding trial in the hospital, moore removed yesterday for TOV in TCU. PVRs will be checked. No known prior hx of prostate problems per his report. He denies abdominal pain. No fever, cough, congestion. No SOB at rest, chest pain, dizziness. Has left hip pain, DJD, limits his progress in therapy. No new vision or hearing concerns. Lives with his son.      REVIEW OF SYSTEMS:  All others negative other than those noted in HPI.      PAST MEDICAL HISTORY:  Past Medical History:   Diagnosis Date     Chronic kidney disease      Chronic osteoarthritis      Type 1 diabetes (H)        PAST SURGICAL HISTORY:  Past Surgical History:   Procedure Laterality Date     OPEN REDUCTION INTERNAL FIXATION HIP NAILING Right 2024    Procedure: INTERNAL FIXATION, FRACTURE, TROCHANTERIC, HIP, USING NAIL;  Surgeon: Son Talavera MD;  Location: [facility] Main OR       FAMILY HISTORY:  No family history on file.      SOCIAL HISTORY:  Social History     Socioeconomic History     Marital status:      Spouse name: Not on file     Number of children: Not on file     Years of education: Not on file     Highest education level: Not on file   Occupational History     Not on file   Tobacco Use     Smoking status: Former     Types: Cigars     Quit date:      Years since quittin.7     Smokeless tobacco: Never   Substance and Sexual Activity     Alcohol use: Not on file     Drug use: Not on file     Sexual activity: Not on file   Other Topics Concern     Parent/sibling w/ CABG, MI or angioplasty before 65F 55M? Not Asked   Social History Narrative     Not on file     Social Determinants of Health     Financial Resource Strain: Low Risk  (2024)    Financial Resource Strain      Within the past 12 months, have you or your family members you live with been unable to get utilities (heat, electricity) when it was really needed?: No   Food Insecurity: Low  Risk  (9/14/2024)    Food Insecurity      Within the past 12 months, did you worry that your food would run out before you got money to buy more?: No      Within the past 12 months, did the food you bought just not last and you didn t have money to get more?: No   Transportation Needs: Low Risk  (9/14/2024)    Transportation Needs      Within the past 12 months, has lack of transportation kept you from medical appointments, getting your medicines, non-medical meetings or appointments, work, or from getting things that you need?: No   Physical Activity: Not on file   Stress: Not on file   Social Connections: Unknown (1/1/2022)    Received from [facility]    Social Connections      Frequency of Communication with Friends and Family: Not on file   Interpersonal Safety: Low Risk  (9/14/2024)    Interpersonal Safety      Do you feel physically and emotionally safe where you currently live?: Yes      Within the past 12 months, have you been hit, slapped, kicked or otherwise physically hurt by someone?: No      Within the past 12 months, have you been humiliated or emotionally abused in other ways by your partner or ex-partner?: No   Housing Stability: Low Risk  (9/14/2024)    Housing Stability      Do you have housing? : Yes      Are you worried about losing your housing?: No       MEDICATIONS:  Current Outpatient Medications   Medication Sig Dispense Refill     acetaminophen (TYLENOL) 325 MG tablet Take 3 tablets (975 mg) by mouth every 8 hours. 100 tablet 0     aspirin 81 MG EC tablet Take 1 tablet (81 mg) by mouth 2 times daily. 60 tablet 0     Barberry-Oreg Grape-Goldenseal (BERBERINE COMPLEX PO) Take 1 capsule by mouth daily       blood glucose (NO BRAND SPECIFIED) test strip Use to test blood sugar 6-7 times daily or as directed.       Cholecalciferol (VITAMIN D-3) 125 MCG (5000 UT) TABS Take 5,000 Units by mouth daily        diclofenac (VOLTAREN) 1 % topical gel Apply 4 g topically  "4 times daily. Apply to the LEFT hip/groin area (not right) 50 g 0     HERBALS Take 1 each by mouth daily (Mushroom complex 6)       insulin lispro (HUMALOG VIAL) 100 UNIT/ML vial Inject subcutaneously 3 times daily (before meals). INJECT 60 UNITS UNDER THE SKIN AS DIRECTED. BOLUS UP TO 30 UNITS DAILY.  UNITS DAILY. USE WITH INSULIN PUMP       INSULIN PUMP - OUTPATIENT Per Endocrinology  [employer]       Multiple Vitamins-Minerals (MULTIVITAMIN MEN) TABS Take 1 tablet by mouth daily       oxyCODONE (ROXICODONE) 5 MG tablet Take 1 tablet (5 mg) by mouth every 4 hours as needed for moderate pain. 30 tablet 0     polyethylene glycol (MIRALAX) 17 GM/Dose powder Take 1 capful. by mouth daily as needed.       pravastatin (PRAVACHOL) 40 MG tablet Take 40 mg by mouth daily.       probiotic CAPS Take 1 capsule by mouth daily (Zenwise- pre-and pro-biotic)       senna-docusate (SENOKOT-S/PERICOLACE) 8.6-50 MG tablet Take 2 tablets by mouth 2 times daily as needed for constipation. 60 tablet 0        ALLERGIES:  Allergies   Allergen Reactions     Ammonium Lac Itching     Legs very red, excessive itching     Amoxicillin Nausea     Other reaction(s): nausea     Erythromycin Nausea     Naprosyn [Naproxen] Unknown     9/14/24 taking aspirin 81 mg pta/[facility] in[facility] at [facility]       PHYSICAL EXAM:  General: Patient is alert male, no distress.   Vitals: BP (!) 140/80   Pulse 80   Temp 97.7  F (36.5  C)   Resp 18   Ht 1.854 m (6' 1\")   Wt 120.5 kg (265 lb 9.6 oz)   SpO2 96%   BMI 35.04 kg/m    HEENT: Head is NCAT. Eyes show no injection or icterus. Nares negative. Oropharynx well hydrated.  Neck: Supple. No tenderness or adenopathy. No JVD.  Lungs: Clear bilaterally. No wheezes.  Cardiovascular: Regular rate and rhythm, normal S1, S2.  Back: No spinal or CVA tenderness.  Abdomen: Soft, no tenderness on exam. Bowel sounds present. No guarding rebound or rigidity.  : Deferred.  Extremities: No edema is " "noted.  Musculoskeletal: Degen changes.   Skin: Bandage right hip.   Psych: Mood appears good.      LABS/DIAGNOSTIC DATA:  Component      Latest Ref [facility] 9/14/2024  10:36 AM 9/15/2024  5:47 AM 9/16/2024  6:08 AM 9/20/2024  6:09 AM   Hemoglobin      13.3 - 17.7 g/dL 14.6  13.3  11.1 (L)  11.4 (L)    WBC      4.0 - 11.0 10e3/uL 14.7 (H)  13.9 (H)   7.6    RBC Count      4.40 - 5.90 10e6/uL 4.88  4.41   3.81 (L)    Hematocrit      40.0 - 53.0 % 44.4  40.4   36.3 (L)    MCV      78 - 100 fL 91  92   95    MCH      26.5 - 33.0 pg 29.9  30.2   29.9    MCHC      31.5 - 36.5 g/dL 32.9  32.9   31.4 (L)    RDW      10.0 - 15.0 % 13.3  13.3   13.4    Platelet Count      150 - 450 10e3/uL 300  287   338       Component      Latest Ref [facility] 9/14/2024  10:36 AM 9/15/2024  5:47 AM 9/16/2024  6:08 AM 9/20/2024  6:09 AM   Sodium      135 - 145 mmol/L 142  136   140    Anion Gap      7 - 15 mmol/L 9  12   7    Calcium      8.8 - 10.4 mg/dL 8.7 (L)  8.6 (L)   8.6 (L)    Creatinine      0.67 - 1.17 mg/dL 1.17  1.39 (H)  1.33 (H)  1.13    GFR Estimate      >60 mL/min/1.73m2 65  53 (L)  56 (L)  68    Potassium      3.4 - 5.3 mmol/L 4.2  4.6   4.6    Carbon Dioxide (CO2)      22 - 29 mmol/L 27  23   29    Urea Nitrogen      8.0 - 23.0 mg/dL 22.6  27.6 (H)   20.9    Chloride      98 - 107 mmol/L 106  101   104         ASSESSMENT/PLAN:  Right hip fracture. S/p 9/14/2024 Right hip trochanteric nailing with cephalomedullary device. \"Flat foot\" WB at 50% with a walker AAT for 6 weeks. Follow up with orthopedics.   ABLA. Mild, no need for transfusion.  Left hip pain. Known DJD. Potential limiting factor for therapies.   SARIAH. On CKD. Labs as noted above, reviewed. Recheck BMP in TCU.  DM. Insulin lispro with meals. Has insulin pump. Accuchecks followed closely.   Urinary retention. TOV in TCU underway, moore removed yesterday.   HLD. On pravastatin.   Diabetic neuropathy.   Code status is DNR/DNI.        Electronically signed by: Esrtellita VANEGAS" MD Page       Sincerely,        Estrellita Abel MD

## 2024-09-25 ENCOUNTER — TRANSITIONAL CARE UNIT VISIT (OUTPATIENT)
Dept: GERIATRICS | Facility: CLINIC | Age: 74
End: 2024-09-25
Payer: COMMERCIAL

## 2024-09-25 VITALS
HEART RATE: 95 BPM | TEMPERATURE: 96.8 F | WEIGHT: 265.5 LBS | OXYGEN SATURATION: 92 % | SYSTOLIC BLOOD PRESSURE: 150 MMHG | HEIGHT: 73 IN | DIASTOLIC BLOOD PRESSURE: 71 MMHG | BODY MASS INDEX: 35.19 KG/M2 | RESPIRATION RATE: 20 BRPM

## 2024-09-25 DIAGNOSIS — R53.81 PHYSICAL DECONDITIONING: ICD-10-CM

## 2024-09-25 DIAGNOSIS — S72.001A HIP FRACTURE, RIGHT, CLOSED, INITIAL ENCOUNTER (H): Primary | ICD-10-CM

## 2024-09-25 DIAGNOSIS — M16.51 POST-TRAUMATIC OSTEOARTHRITIS OF RIGHT HIP: ICD-10-CM

## 2024-09-25 DIAGNOSIS — M25.551 RIGHT HIP PAIN: ICD-10-CM

## 2024-09-25 DIAGNOSIS — E10.69 TYPE 1 DIABETES MELLITUS WITH OTHER SPECIFIED COMPLICATION (H): ICD-10-CM

## 2024-09-25 DIAGNOSIS — E66.01 MORBID OBESITY (H): ICD-10-CM

## 2024-09-25 PROCEDURE — 99309 SBSQ NF CARE MODERATE MDM 30: CPT

## 2024-09-25 NOTE — LETTER
9/25/2024      Cam Hernandez  [address] [address]        [facility] GERIATRICS    PRIMARY CARE PROVIDER AND CLINIC:  Soto Gaston MD, [address] [address] / [facility] [address]  Chief Complaint   Patient presents with     RECHECK      [place] Medical Record Number:  [id number]  Place of Service where encounter took place:  [facility] ([facility]) [[id number]]    Cam Hernandez  is a 74 year old  (1950), admitted to the above facility from  [facility]. Hospital stay 9/14/24 through 9/17/24.  HPI: PMH  of type 1 diabetes, neuropathy, HLD, NBA with CPAP use, presented to emergency room with mechanical fall that led to right intertrochanteric  hip fracture post fall.  Orthopedic procedure completed on 9/14/2024 right hip trochanteric nailing with cephalomedullary device.  He transferred to U for rehab and  medical management.    History obtained from: facility chart records, facility staff, patient report, [facility] chart review, and Care Everywhere [facility] chart review        is seen in his room lying in bed . He reports he has been doing ok.  His constipation resolved 2 days ago with large bowel movement .he is encouraged to continue utilizing bowel medication. He is passing gas and eating drinking ok . He is bed bound and using mechanical left for transfer at this time. He denies any other acute issues today he manages his insulin pump independently.  Staff reports he is doing well with mechanical lift transfers.    CODE STATUS/ADVANCE DIRECTIVES DISCUSSION:  No CPR- Do NOT Intubate  CPR/Full code   ALLERGIES:   Allergies   Allergen Reactions     Ammonium Lac Itching     Legs very red, excessive itching     Amoxicillin Nausea     Other reaction(s): nausea     Erythromycin Nausea     Naprosyn [Naproxen] Unknown     9/14/24 taking aspirin 81 mg pta/given inpt at [facility]      PAST MEDICAL HISTORY: No past medical history on file.   PAST SURGICAL HISTORY:    has a past surgical history that includes Open reduction internal fixation hip nailing (Right, 9/14/2024).  FAMILY HISTORY: family history is not on file.  SOCIAL HISTORY:   reports that he quit smoking about 11 years ago. His smoking use included cigars. He has never used smokeless tobacco.  Patient's living condition: lives with family, Son     Post Discharge Medication Reconciliation Status:   MED REC REQUIRED{  Post Medication Reconciliation Status: discharge medications reconciled and changed, per note/orders       Current Outpatient Medications   Medication Sig Dispense Refill     acetaminophen (TYLENOL) 325 MG tablet Take 3 tablets (975 mg) by mouth every 8 hours. 100 tablet 0     aspirin 81 MG EC tablet Take 1 tablet (81 mg) by mouth 2 times daily. 60 tablet 0     Barberry-Oreg Grape-Goldenseal (BERBERINE COMPLEX PO) Take 1 capsule by mouth daily       blood glucose (NO BRAND SPECIFIED) test strip Use to test blood sugar 6-7 times daily or as directed.       Cholecalciferol (VITAMIN D-3) 125 MCG (5000 UT) TABS Take 5,000 Units by mouth daily        diclofenac (VOLTAREN) 1 % topical gel Apply 4 g topically 4 times daily. Apply to the LEFT hip/groin area (not right) 50 g 0     HERBALS Take 1 each by mouth daily (Mushroom complex 6)       insulin lispro (HUMALOG VIAL) 100 UNIT/ML vial Inject subcutaneously 3 times daily (before meals). INJECT 60 UNITS UNDER THE SKIN AS DIRECTED. BOLUS UP TO 30 UNITS DAILY.  UNITS DAILY. USE WITH INSULIN PUMP       INSULIN PUMP - OUTPATIENT Per Endocrinology  [facility]       Multiple Vitamins-Minerals (MULTIVITAMIN MEN) TABS Take 1 tablet by mouth daily       oxyCODONE (ROXICODONE) 5 MG tablet Take 1 tablet (5 mg) by mouth every 4 hours as needed for moderate pain. 26 tablet 0     polyethylene glycol (MIRALAX) 17 GM/Dose powder Take 1 capful. by mouth daily as needed.       pravastatin (PRAVACHOL) 40 MG tablet Take 40 mg by mouth daily.       probiotic CAPS Take 1 capsule  "by mouth daily (Zenwise- pre-and pro-biotic)       senna-docusate (SENOKOT-S/PERICOLACE) 8.6-50 MG tablet Take 2 tablets by mouth 2 times daily as needed for constipation. 60 tablet 0     No current facility-administered medications for this visit.       ROS:  4 point ROS including Respiratory, CV, GI and , other than that noted in the HPI,  is negative    Vitals:  BP (!) 150/71   Pulse 95   Temp 96.8  F (36  C)   Resp 20   Ht 1.854 m (6' 1\")   Wt 120.4 kg (265 lb 8 oz)   SpO2 92%   BMI 35.03 kg/m    Exam:  GENERAL APPEARANCE:  Alert, in no distress, morbidly obese  NECK:  No adenopathy,masses or thyromegaly  RESP:  respiratory effort and palpation of chest normal, lungs clear to auscultation , no respiratory distress  CV:  regular rate and rhythm, no murmur, rub, or gallop, trace edema BLE  ABDOMEN:  normal bowel sounds, soft, nontender, no hepatosplenomegaly or other masses, reports constipation, no guarding or rebound, bowel sounds normal  :    Allison removed trial of void successful using urinal   M/S:   Bed bound/uses lift for transfer  SKIN:  wound healing well, no signs of infection site is covered no sign of infection  NEURO:   Examination of sensation by touch normal  PSYCH:  oriented X 3    Lab/Diagnostic data:  Most Recent 3 CBC's:  Recent Labs   Lab Test 09/20/24  0609 09/16/24  0608 09/15/24  0547 09/14/24  1036   WBC 7.6  --  13.9* 14.7*   HGB 11.4* 11.1* 13.3 14.6   MCV 95  --  92 91     --  287 300     Most Recent 3 BMP's:  Recent Labs   Lab Test 09/20/24  0609 09/17/24  1210 09/17/24  0750 09/16/24  0748 09/16/24  0608 09/15/24  0710 09/15/24  0547 09/14/24 2033 09/14/24  1036     --   --   --   --   --  136  --  142   POTASSIUM 4.6  --   --   --   --   --  4.6  --  4.2   CHLORIDE 104  --   --   --   --   --  101  --  106   CO2 29  --   --   --   --   --  23  --  27   BUN 20.9  --   --   --   --   --  27.6*  --  22.6   CR 1.13  --   --   --  1.33*  --  1.39*  --  1.17 "   ANIONGAP 7  --   --   --   --   --  12  --  9   ETHEL 8.6*  --   --   --   --   --  8.6*  --  8.7*   * 118* 139*   < >  --    < > 200*   < > 142*    < > = values in this interval not displayed.       ASSESSMENT/PLAN:  (S72.001A) Hip fracture, right, closed,  (primary encounter diagnosis)  (M25.551) Right hip pain  (M16.51) Post-traumatic osteoarthritis of   (R53.81) Physical deconditioning  Comment: Acute on chronic right intertrochanteric hip fracture and underwent ORIF on 9/14/24 incision site covered and intact.  Plan:   - Pain managed with  APAP/Oxycodone   -Follow-up with [facility] on 10/2/2024  -PT/OT eval and treat  - ASA for anticoagulation BID for 30 days       (E66.01) Morbid obesity (H)  Comment: Acute on chronic he has been working on small portion meals   Plan: RD to see and provide diety modification teaching as needed.    (E10.69) Type 1 diabetes mellitus with other specified complication (H)  Comment: no epidote of hyperglycinemia reported BS controlled  Last A1C 6.1  Plan:   - Continue Continues insuline use via self managed pump  -Education provided now that  he is working with PT to watch his blood sugar closely        -Routine lab in 2 to 3 weeks       Electronically signed by:RUTH Nunez CNP                         Sincerely,        RUTH Nunez CNP

## 2024-09-25 NOTE — PROGRESS NOTES
[facility] GERIATRICS    PRIMARY CARE PROVIDER AND CLINIC:  Soto Gaston MD, [address] [facility] [address] [address]  Chief Complaint   Patient presents with    RECHECK      [place] Medical Record Number:  [id number]  Place of Service where encounter took place:  [facility] ([facility]) [[id number]]    Cam Hernandez  is a 74 year old  (1950), admitted to the above facility from  [facility]. Hospital stay 9/14/24 through 9/17/24.  HPI: PMH  of type 1 diabetes, neuropathy, HLD, NBA with CPAP use, presented to emergency room with mechanical fall that led to right intertrochanteric  hip fracture post fall.  Orthopedic procedure completed on 9/14/2024 right hip trochanteric nailing with cephalomedullary device.  He transferred to [facility] for rehab and  medical management.    History obtained from: facility chart records, facility staff, patient report, [facility] chart review, and Care Everywhere [facility] chart review        is seen in his room lying in bed . He reports he has been doing ok.  His constipation resolved 2 days ago with large bowel movement .he is encouraged to continue utilizing bowel medication. He is passing gas and eating drinking ok . He is bed bound and using mechanical left for transfer at this time. He denies any other acute issues today he manages his insulin pump independently.  Staff reports he is doing well with mechanical lift transfers.    CODE STATUS/ADVANCE DIRECTIVES DISCUSSION:  No CPR- Do NOT Intubate  CPR/Full code   ALLERGIES:   Allergies   Allergen Reactions    Ammonium Lac Itching     Legs very red, excessive itching    Amoxicillin Nausea     Other reaction(s): nausea    Erythromycin Nausea    Naprosyn [Naproxen] Unknown     9/14/24 taking aspirin 81 mg pta/given inpt at [facility]      PAST MEDICAL HISTORY: No past medical history on file.   PAST SURGICAL HISTORY:   has a past surgical history that includes Open reduction internal fixation hip  nailing (Right, 9/14/2024).  FAMILY HISTORY: family history is not on file.  SOCIAL HISTORY:   reports that he quit smoking about 11 years ago. His smoking use included cigars. He has never used smokeless tobacco.  Patient's living condition: lives with family, Son     Post Discharge Medication Reconciliation Status:   MED REC REQUIRED{  Post Medication Reconciliation Status: discharge medications reconciled and changed, per note/orders       Current Outpatient Medications   Medication Sig Dispense Refill    acetaminophen (TYLENOL) 325 MG tablet Take 3 tablets (975 mg) by mouth every 8 hours. 100 tablet 0    aspirin 81 MG EC tablet Take 1 tablet (81 mg) by mouth 2 times daily. 60 tablet 0    Barberry-Oreg Grape-Goldenseal (BERBERINE COMPLEX PO) Take 1 capsule by mouth daily      blood glucose (NO BRAND SPECIFIED) test strip Use to test blood sugar 6-7 times daily or as directed.      Cholecalciferol (VITAMIN D-3) 125 MCG (5000 UT) TABS Take 5,000 Units by mouth daily       diclofenac (VOLTAREN) 1 % topical gel Apply 4 g topically 4 times daily. Apply to the LEFT hip/groin area (not right) 50 g 0    HERBALS Take 1 each by mouth daily (Mushroom complex 6)      insulin lispro (HUMALOG VIAL) 100 UNIT/ML vial Inject subcutaneously 3 times daily (before meals). INJECT 60 UNITS UNDER THE SKIN AS DIRECTED. BOLUS UP TO 30 UNITS DAILY.  UNITS DAILY. USE WITH INSULIN PUMP      INSULIN PUMP - OUTPATIENT Per Endocrinology  Medtronic      Multiple Vitamins-Minerals (MULTIVITAMIN MEN) TABS Take 1 tablet by mouth daily      oxyCODONE (ROXICODONE) 5 MG tablet Take 1 tablet (5 mg) by mouth every 4 hours as needed for moderate pain. 26 tablet 0    polyethylene glycol (MIRALAX) 17 GM/Dose powder Take 1 capful. by mouth daily as needed.      pravastatin (PRAVACHOL) 40 MG tablet Take 40 mg by mouth daily.      probiotic CAPS Take 1 capsule by mouth daily (Zenwise- pre-and pro-biotic)      senna-docusate (SENOKOT-S/PERICOLACE)  "8.6-50 MG tablet Take 2 tablets by mouth 2 times daily as needed for constipation. 60 tablet 0     No current facility-administered medications for this visit.       ROS:  4 point ROS including Respiratory, CV, GI and , other than that noted in the HPI,  is negative    Vitals:  BP (!) 150/71   Pulse 95   Temp 96.8  F (36  C)   Resp 20   Ht 1.854 m (6' 1\")   Wt 120.4 kg (265 lb 8 oz)   SpO2 92%   BMI 35.03 kg/m    Exam:  GENERAL APPEARANCE:  Alert, in no distress, morbidly obese  NECK:  No adenopathy,masses or thyromegaly  RESP:  respiratory effort and palpation of chest normal, lungs clear to auscultation , no respiratory distress  CV:  regular rate and rhythm, no murmur, rub, or gallop, trace edema BLE  ABDOMEN:  normal bowel sounds, soft, nontender, no hepatosplenomegaly or other masses, reports constipation, no guarding or rebound, bowel sounds normal  :    Allison removed trial of void successful using urinal   M/S:   Bed bound/uses lift for transfer  SKIN:  wound healing well, no signs of infection site is covered no sign of infection  NEURO:   Examination of sensation by touch normal  PSYCH:  oriented X 3    Lab/Diagnostic data:  Most Recent 3 CBC's:  Recent Labs   Lab Test 09/20/24  0609 09/16/24  0608 09/15/24  0547 09/14/24  1036   WBC 7.6  --  13.9* 14.7*   HGB 11.4* 11.1* 13.3 14.6   MCV 95  --  92 91     --  287 300     Most Recent 3 BMP's:  Recent Labs   Lab Test 09/20/24  0609 09/17/24  1210 09/17/24  0750 09/16/24  0748 09/16/24  0608 09/15/24  0710 09/15/24  0547 09/14/24 2033 09/14/24  1036     --   --   --   --   --  136  --  142   POTASSIUM 4.6  --   --   --   --   --  4.6  --  4.2   CHLORIDE 104  --   --   --   --   --  101  --  106   CO2 29  --   --   --   --   --  23  --  27   BUN 20.9  --   --   --   --   --  27.6*  --  22.6   CR 1.13  --   --   --  1.33*  --  1.39*  --  1.17   ANIONGAP 7  --   --   --   --   --  12  --  9   ETHEL 8.6*  --   --   --   --   --  8.6*  --  " 8.7*   * 118* 139*   < >  --    < > 200*   < > 142*    < > = values in this interval not displayed.       ASSESSMENT/PLAN:  (S72.001A) Hip fracture, right, closed,  (primary encounter diagnosis)  (M25.551) Right hip pain  (M16.51) Post-traumatic osteoarthritis of   (R53.81) Physical deconditioning  Comment: Acute on chronic right intertrochanteric hip fracture and underwent ORIF on 9/14/24 incision site covered and intact.  Plan:   - Pain managed with  APAP/Oxycodone   -Follow-up with [facility] on 10/2/2024  -PT/OT eval and treat  - ASA for anticoagulation BID for 30 days       (E66.01) Morbid obesity (H)  Comment: Acute on chronic he has been working on small portion meals   Plan: RD to see and provide diety modification teaching as needed.    (E10.69) Type 1 diabetes mellitus with other specified complication (H)  Comment: no epidote of hyperglycinemia reported BS controlled  Last A1C 6.1  Plan:   - Continue Continues insuline use via self managed pump  -Education provided now that  he is working with PT to watch his blood sugar closely        -Routine lab in 2 to 3 weeks       Electronically signed by:RUTH Nunez CNP

## 2024-09-26 ENCOUNTER — LAB REQUISITION (OUTPATIENT)
Dept: LAB | Facility: CLINIC | Age: 74
End: 2024-09-26
Payer: COMMERCIAL

## 2024-09-26 DIAGNOSIS — S72.001A HIP FRACTURE, RIGHT, CLOSED, INITIAL ENCOUNTER (H): ICD-10-CM

## 2024-09-26 LAB
ALBUMIN UR-MCNC: 20 MG/DL
APPEARANCE UR: CLEAR
BILIRUB UR QL STRIP: NEGATIVE
COLOR UR AUTO: YELLOW
GLUCOSE UR STRIP-MCNC: NEGATIVE MG/DL
HGB UR QL STRIP: ABNORMAL
KETONES UR STRIP-MCNC: NEGATIVE MG/DL
LEUKOCYTE ESTERASE UR QL STRIP: ABNORMAL
NITRATE UR QL: NEGATIVE
PH UR STRIP: 5.5 [PH] (ref 5–7)
RBC URINE: 46 /HPF
SP GR UR STRIP: 1.02 (ref 1–1.03)
UROBILINOGEN UR STRIP-MCNC: 2 MG/DL
WBC URINE: 19 /HPF

## 2024-09-26 PROCEDURE — 81001 URINALYSIS AUTO W/SCOPE: CPT | Performed by: FAMILY MEDICINE

## 2024-09-26 PROCEDURE — 87086 URINE CULTURE/COLONY COUNT: CPT | Performed by: FAMILY MEDICINE

## 2024-09-26 RX ORDER — OXYCODONE HYDROCHLORIDE 5 MG/1
5 TABLET ORAL EVERY 4 HOURS PRN
Qty: 30 TABLET | Refills: 0 | Status: SHIPPED | OUTPATIENT
Start: 2024-09-26

## 2024-09-27 LAB — BACTERIA UR CULT: NO GROWTH

## 2024-09-30 ENCOUNTER — TRANSITIONAL CARE UNIT VISIT (OUTPATIENT)
Dept: GERIATRICS | Facility: CLINIC | Age: 74
End: 2024-09-30
Payer: COMMERCIAL

## 2024-09-30 DIAGNOSIS — N17.9 AKI (ACUTE KIDNEY INJURY) (H): ICD-10-CM

## 2024-09-30 DIAGNOSIS — S72.001A HIP FRACTURE, RIGHT, CLOSED, INITIAL ENCOUNTER (H): Primary | ICD-10-CM

## 2024-09-30 DIAGNOSIS — E10.69 TYPE 1 DIABETES MELLITUS WITH OTHER SPECIFIED COMPLICATION (H): ICD-10-CM

## 2024-09-30 DIAGNOSIS — D62 ABLA (ACUTE BLOOD LOSS ANEMIA): ICD-10-CM

## 2024-09-30 DIAGNOSIS — R33.9 URINARY RETENTION: ICD-10-CM

## 2024-09-30 PROCEDURE — 99309 SBSQ NF CARE MODERATE MDM 30: CPT | Performed by: FAMILY MEDICINE

## 2024-09-30 NOTE — LETTER
9/30/2024      Cam Hernandez  [address] [address]        [facility] GERIATRICS  [place] Medical Record Number:  [id number]  Place of Service where encounter took place: [facility] ([facility]U) [[id number]]  CODE STATUS:   DNR / DNI      Chief Complaint/Reason for Visit:  Chief Complaint   Patient presents with     RECHECK     TCU 9/30/2024.        TCU HPI:    Cam Hernandez is a 74 year old male with hx of IDDM presented to the hospital on 9/14/2024 after a fall in which he sustained a right hip fracture as noted below.    [facility]  Hospitalist Discharge Summary    Date of Admission:  9/14/2024  Date of Discharge:  9/17/2024     Hospital Course  74-year-old diabetic on insulin pump with diabetic neuropathy who presented with right hip pain and was found to have right intertrochanteric hip fracture and underwent ORIF on 9/14.  Patient denies any postoperative pain.     Type 1 diabetes on insulin pump  -Blood sugars reasonably well-controlled      ARF  -- Creatinine increased to 1.3  -Creatinine improved with normal saline bolus  --        Creatinine   Date Value Ref Range Status   09/16/2024 1.33 (H) 0.67 - 1.17 mg/dL Final   -- Baseline creatinine of 1.2     Leukocytosis likely reactive  -- Trending down  -- No evidence of DVT        WBC Count   Date Value Ref Range Status   09/15/2024 13.9 (H) 4.0 - 11.0 10e3/uL Final   -- Echo is unremarkable     Left hip pain noted on 9/16  -- x-ray of the left hip which showed degenerative changes  -- No fracture or dislocation  -- Continue physical therapy.     Acute urinary retention  -- Failed weaning trial.  Allison's inserted on 9/15  -- Trial of voiding in 1 week     Other medical conditions include  Hyperlipidemia, obesity, diabetic neuropathy,?Sclerosis     Overall stabilized and discharged to TCU on 9/17/2024 for PT, OT, nursing cares, medical management and monitoring.     Today:  Continuing efforts in therapy, WB  restriction per ortho. Follow up appt 10/2/2024. Came to TCU with moore, failed TOV, moore replaced on 9/26/2024 due to significantly elevated PVRs and discomfort. Urology appt to be made. He denies abdominal pain. No fever, cough, congestion. No SOB at rest, chest pain, dizziness. Has left hip pain, DJD, not new.       PAST MEDICAL HISTORY:  Past Medical History:   Diagnosis Date     Chronic kidney disease      Chronic osteoarthritis      Type 1 diabetes (H)        MEDICATIONS:  Current Outpatient Medications   Medication Sig Dispense Refill     acetaminophen (TYLENOL) 325 MG tablet Take 3 tablets (975 mg) by mouth every 8 hours. 100 tablet 0     aspirin 81 MG EC tablet Take 1 tablet (81 mg) by mouth 2 times daily. 60 tablet 0     Barberry-Oreg Grape-Goldenseal (BERBERINE COMPLEX PO) Take 1 capsule by mouth daily       blood glucose (NO BRAND SPECIFIED) test strip Use to test blood sugar 6-7 times daily or as directed.       Cholecalciferol (VITAMIN D-3) 125 MCG (5000 UT) TABS Take 5,000 Units by mouth daily        diclofenac (VOLTAREN) 1 % topical gel Apply 4 g topically 4 times daily. Apply to the LEFT hip/groin area (not right) 50 g 0     HERBALS Take 1 each by mouth daily (Mushroom complex 6)       insulin lispro (HUMALOG VIAL) 100 UNIT/ML vial Inject subcutaneously 3 times daily (before meals). INJECT 60 UNITS UNDER THE SKIN AS DIRECTED. BOLUS UP TO 30 UNITS DAILY.  UNITS DAILY. USE WITH INSULIN PUMP       INSULIN PUMP - OUTPATIENT Per Endocrinology  Medtronic       Multiple Vitamins-Minerals (MULTIVITAMIN MEN) TABS Take 1 tablet by mouth daily       oxyCODONE (ROXICODONE) 5 MG tablet Take 1 tablet (5 mg) by mouth every 4 hours as needed for moderate pain. 30 tablet 0     polyethylene glycol (MIRALAX) 17 GM/Dose powder Take 1 capful. by mouth daily as needed.       pravastatin (PRAVACHOL) 40 MG tablet Take 40 mg by mouth daily.       probiotic CAPS Take 1 capsule by mouth daily (Zenwise- pre-and  "pro-biotic)       senna-docusate (SENOKOT-S/PERICOLACE) 8.6-50 MG tablet Take 2 tablets by mouth 2 times daily as needed for constipation. 60 tablet 0        PHYSICAL EXAM:  General: Patient is alert male, no distress.   Vitals: /57   Pulse 89   Temp 97.3  F (36.3  C)   Resp 18   Ht 1.854 m (6' 1\")   Wt 120.2 kg (265 lb)   SpO2 95%   BMI 34.96 kg/m    HEENT: Head is NCAT. Eyes show no injection or icterus. Nares negative. Oropharynx well hydrated.  Neck: No JVD.  Lungs: Non labored respirations.   : Allison.  Extremities: No signif distal edema is noted.  Musculoskeletal: Degen changes.   Skin: Bandage right hip.   Psych: Mood appears good.      LABS/DIAGNOSTIC DATA:  Component      Latest Ref [facility] 9/14/2024  10:36 AM 9/15/2024  5:47 AM 9/16/2024  6:08 AM 9/20/2024  6:09 AM   Hemoglobin      13.3 - 17.7 g/dL 14.6  13.3  11.1 (L)  11.4 (L)    WBC      4.0 - 11.0 10e3/uL 14.7 (H)  13.9 (H)   7.6    RBC Count      4.40 - 5.90 10e6/uL 4.88  4.41   3.81 (L)    Hematocrit      40.0 - 53.0 % 44.4  40.4   36.3 (L)    MCV      78 - 100 fL 91  92   95    MCH      26.5 - 33.0 pg 29.9  30.2   29.9    MCHC      31.5 - 36.5 g/dL 32.9  32.9   31.4 (L)    RDW      10.0 - 15.0 % 13.3  13.3   13.4    Platelet Count      150 - 450 10e3/uL 300  287   338       Component      Latest Ref [facility] 9/14/2024  10:36 AM 9/15/2024  5:47 AM 9/16/2024  6:08 AM 9/20/2024  6:09 AM   Sodium      135 - 145 mmol/L 142  136   140    Anion Gap      7 - 15 mmol/L 9  12   7    Calcium      8.8 - 10.4 mg/dL 8.7 (L)  8.6 (L)   8.6 (L)    Creatinine      0.67 - 1.17 mg/dL 1.17  1.39 (H)  1.33 (H)  1.13    GFR Estimate      >60 mL/min/1.73m2 65  53 (L)  56 (L)  68    Potassium      3.4 - 5.3 mmol/L 4.2  4.6   4.6    Carbon Dioxide (CO2)      22 - 29 mmol/L 27  23   29    Urea Nitrogen      8.0 - 23.0 mg/dL 22.6  27.6 (H)   20.9    Chloride      98 - 107 mmol/L 106  101   104         ASSESSMENT/PLAN:  Right hip fracture. S/p 9/14/2024 Right hip " "trochanteric nailing with cephalomedullary device. \"Flat foot\" WB at 50% with a walker AAT for 6 weeks. Follow up with orthopedics, appt 10/2/2024. Known left hip DJD.  ABLA. Mild, labs as noted above.  SARIAH. On CKD. Labs as above. Recheck BMP in TCU.  DM. Insulin lispro with meals. Has insulin pump. Accuchecks followed closely.   Urinary retention. Failed TOV, moore replaced 9/26/2024, follow up with urology.          Electronically signed by: Estrellita Abel MD       Sincerely,        Estrellita Abel MD      "

## 2024-10-01 VITALS
OXYGEN SATURATION: 95 % | TEMPERATURE: 97.3 F | DIASTOLIC BLOOD PRESSURE: 57 MMHG | HEART RATE: 89 BPM | HEIGHT: 73 IN | WEIGHT: 265 LBS | SYSTOLIC BLOOD PRESSURE: 104 MMHG | RESPIRATION RATE: 18 BRPM | BODY MASS INDEX: 35.12 KG/M2

## 2024-10-03 ENCOUNTER — TRANSITIONAL CARE UNIT VISIT (OUTPATIENT)
Dept: GERIATRICS | Facility: CLINIC | Age: 74
End: 2024-10-03
Payer: COMMERCIAL

## 2024-10-03 VITALS
HEIGHT: 73 IN | RESPIRATION RATE: 18 BRPM | DIASTOLIC BLOOD PRESSURE: 75 MMHG | WEIGHT: 264 LBS | TEMPERATURE: 97.5 F | OXYGEN SATURATION: 94 % | HEART RATE: 100 BPM | BODY MASS INDEX: 34.99 KG/M2 | SYSTOLIC BLOOD PRESSURE: 124 MMHG

## 2024-10-03 DIAGNOSIS — E10.69 TYPE 1 DIABETES MELLITUS WITH OTHER SPECIFIED COMPLICATION (H): ICD-10-CM

## 2024-10-03 DIAGNOSIS — N17.9 AKI (ACUTE KIDNEY INJURY) (H): ICD-10-CM

## 2024-10-03 DIAGNOSIS — D62 ABLA (ACUTE BLOOD LOSS ANEMIA): ICD-10-CM

## 2024-10-03 DIAGNOSIS — R33.9 URINARY RETENTION: ICD-10-CM

## 2024-10-03 DIAGNOSIS — M25.552 HIP PAIN, LEFT: ICD-10-CM

## 2024-10-03 DIAGNOSIS — S72.001A HIP FRACTURE, RIGHT, CLOSED, INITIAL ENCOUNTER (H): Primary | ICD-10-CM

## 2024-10-03 PROCEDURE — 99309 SBSQ NF CARE MODERATE MDM 30: CPT | Performed by: FAMILY MEDICINE

## 2024-10-03 NOTE — LETTER
10/3/2024      Cam Hernandez  [address] [address]        [facility] GERIATRICS  [place] Medical Record Number:  [id number]  Place of Service where encounter took place: [facility] ([facility]) [[id number]]  CODE STATUS:   DNR / DNI      Chief Complaint/Reason for Visit:  Chief Complaint   Patient presents with     RECHECK     TCU 10/3/2024.        TCU HPI:    Cam Hernandez is a 74 year old male with hx of IDDM presented to the hospital on 9/14/2024 after a fall in which he sustained a right hip fracture as noted below.    [facility]  Hospitalist Discharge Summary    Date of Admission:  9/14/2024  Date of Discharge:  9/17/2024     Hospital Course  74-year-old diabetic on insulin pump with diabetic neuropathy who presented with right hip pain and was found to have right intertrochanteric hip fracture and underwent ORIF on 9/14.  Patient denies any postoperative pain.     Type 1 diabetes on insulin pump  -Blood sugars reasonably well-controlled      ARF  -- Creatinine increased to 1.3  -Creatinine improved with normal saline bolus  --        Creatinine   Date Value Ref Range Status   09/16/2024 1.33 (H) 0.67 - 1.17 mg/dL Final   -- Baseline creatinine of 1.2     Leukocytosis likely reactive  -- Trending down  -- No evidence of DVT        WBC Count   Date Value Ref Range Status   09/15/2024 13.9 (H) 4.0 - 11.0 10e3/uL Final   -- Echo is unremarkable     Left hip pain noted on 9/16  -- x-ray of the left hip which showed degenerative changes  -- No fracture or dislocation  -- Continue physical therapy.     Acute urinary retention  -- Failed weaning trial.  Allison's inserted on 9/15  -- Trial of voiding in 1 week     Other medical conditions include  Hyperlipidemia, obesity, diabetic neuropathy,?Sclerosis     Overall stabilized and discharged to TCU on 9/17/2024 for PT, OT, nursing cares, medical management and monitoring.     Today:  He reports bilateral hip pain, right from  "surgery and left from known DJD. He has flat foot WB restriction, saw ortho on 10/2/2024, \"flat foot WB with walker x 4 weeks\" ASA BID has no stop date, will be addressed at next ortho visit given limited mobility at this time. He has moore in place having failed TOV and moore reinserted in TCU on 9/26/2024. Has urology appt on 10/15/2024. He denies abdominal pain. No SOB, chest pain. Appetite is good. No complaints of constipation.       PAST MEDICAL HISTORY:  Past Medical History:   Diagnosis Date     Chronic kidney disease      Chronic osteoarthritis      Type 1 diabetes (H)        MEDICATIONS:  Current Outpatient Medications   Medication Sig Dispense Refill     acetaminophen (TYLENOL) 325 MG tablet Take 3 tablets (975 mg) by mouth every 8 hours. 100 tablet 0     aspirin 81 MG EC tablet Take 1 tablet (81 mg) by mouth 2 times daily. 60 tablet 0     Barberry-Oreg Grape-Goldenseal (BERBERINE COMPLEX PO) Take 1 capsule by mouth daily       blood glucose (NO BRAND SPECIFIED) test strip Use to test blood sugar 6-7 times daily or as directed.       Cholecalciferol (VITAMIN D-3) 125 MCG (5000 UT) TABS Take 5,000 Units by mouth daily        Continuous Glucose  (DEXCOM G7 ) KASSANDRA USE AS DIRECTED PER  INSTRUCTIONS       Continuous Glucose Sensor (DEXCOM G7 SENSOR) MISC USE AS DIRECTED PER  INSTRUCTIONS. REPLACE SENSOR EVERY 10 DAYS       diclofenac (VOLTAREN) 1 % topical gel Apply 4 g topically 4 times daily. Apply to the LEFT hip/groin area (not right) 50 g 0     HERBALS Take 1 each by mouth daily (Mushroom complex 6)       insulin lispro (HUMALOG VIAL) 100 UNIT/ML vial Inject subcutaneously 3 times daily (before meals). INJECT 60 UNITS UNDER THE SKIN AS DIRECTED. BOLUS UP TO 30 UNITS DAILY.  UNITS DAILY. USE WITH INSULIN PUMP       INSULIN PUMP - OUTPATIENT Per Endocrinology  Medtronic       Multiple Vitamins-Minerals (MULTIVITAMIN MEN) TABS Take 1 tablet by mouth daily       " "nitroFURantoin macrocrystal-monohydrate (MACROBID) 100 MG capsule Take 1 capsule (100 mg) by mouth 2 times daily for 5 days. 10 capsule 0     oxyCODONE (ROXICODONE) 5 MG tablet Take 1 tablet (5 mg) by mouth every 4 hours as needed for moderate pain. 30 tablet 0     polyethylene glycol (MIRALAX) 17 GM/Dose powder Take 1 capful. by mouth daily as needed.       pravastatin (PRAVACHOL) 40 MG tablet Take 40 mg by mouth daily.       probiotic CAPS Take 1 capsule by mouth daily (Zenwise- pre-and pro-biotic)       senna-docusate (SENOKOT-S/PERICOLACE) 8.6-50 MG tablet Take 2 tablets by mouth 2 times daily as needed for constipation. 60 tablet 0        PHYSICAL EXAM:  General: Patient is alert male, no distress.   Vitals: /75   Pulse 100   Temp 97.5  F (36.4  C)   Resp 18   Ht 1.854 m (6' 1\")   Wt 119.7 kg (264 lb)   SpO2 94%   BMI 34.83 kg/m    HEENT: Head is NCAT. Eyes show no injection or icterus. Nares negative. Oropharynx well hydrated.  Neck: No JVD.  Lungs: Non labored respirations.   : Allison.  Extremities: No edema.  Musculoskeletal: Degen changes.   Skin: No rashes.   Psych: Mood appears good.      LABS/DIAGNOSTIC DATA:  Component      Latest Ref [facility] 9/14/2024  10:36 AM 9/15/2024  5:47 AM 9/16/2024  6:08 AM 9/20/2024  6:09 AM   Hemoglobin      13.3 - 17.7 g/dL 14.6  13.3  11.1 (L)  11.4 (L)    WBC      4.0 - 11.0 10e3/uL 14.7 (H)  13.9 (H)   7.6    RBC Count      4.40 - 5.90 10e6/uL 4.88  4.41   3.81 (L)    Hematocrit      40.0 - 53.0 % 44.4  40.4   36.3 (L)    MCV      78 - 100 fL 91  92   95    MCH      26.5 - 33.0 pg 29.9  30.2   29.9    MCHC      31.5 - 36.5 g/dL 32.9  32.9   31.4 (L)    RDW      10.0 - 15.0 % 13.3  13.3   13.4    Platelet Count      150 - 450 10e3/uL 300  287   338       Component      Latest Ref Rng 9/14/2024  10:36 AM 9/15/2024  5:47 AM 9/16/2024  6:08 AM 9/20/2024  6:09 AM   Sodium      135 - 145 mmol/L 142  136   140    Anion Gap      7 - 15 mmol/L 9  12   7    Calcium      " "8.8 - 10.4 mg/dL 8.7 (L)  8.6 (L)   8.6 (L)    Creatinine      0.67 - 1.17 mg/dL 1.17  1.39 (H)  1.33 (H)  1.13    GFR Estimate      >60 mL/min/1.73m2 65  53 (L)  56 (L)  68    Potassium      3.4 - 5.3 mmol/L 4.2  4.6   4.6    Carbon Dioxide (CO2)      22 - 29 mmol/L 27  23   29    Urea Nitrogen      8.0 - 23.0 mg/dL 22.6  27.6 (H)   20.9    Chloride      98 - 107 mmol/L 106  101   104         ASSESSMENT/PLAN:  Right hip fracture. S/p 9/14/2024 Right hip trochanteric nailing with cephalomedullary device. Saw ortho on 10/2/2024, \"flat foot WB with walker x 4 weeks\".  Left hip pain. Known left hip DJD.  ABLA. Mild, labs as noted.  SARIAH. On CKD. Recheck BMP in TCU.  DM. Insulin lispro with meals. Has insulin pump. Follow accuchecks.  Urinary retention. Failed TOV, moore replaced 9/26/2024, follow up with urology, appt on 10/15/2024.         Electronically signed by: Estrellita Abel MD       Sincerely,        Estrellita Abel MD      "

## 2024-10-07 ENCOUNTER — TRANSITIONAL CARE UNIT VISIT (OUTPATIENT)
Dept: GERIATRICS | Facility: CLINIC | Age: 74
End: 2024-10-07
Payer: COMMERCIAL

## 2024-10-07 VITALS
WEIGHT: 264 LBS | OXYGEN SATURATION: 98 % | HEIGHT: 73 IN | DIASTOLIC BLOOD PRESSURE: 66 MMHG | HEART RATE: 97 BPM | SYSTOLIC BLOOD PRESSURE: 124 MMHG | TEMPERATURE: 97.9 F | BODY MASS INDEX: 34.99 KG/M2 | RESPIRATION RATE: 18 BRPM

## 2024-10-07 DIAGNOSIS — D62 ABLA (ACUTE BLOOD LOSS ANEMIA): ICD-10-CM

## 2024-10-07 DIAGNOSIS — M25.552 HIP PAIN, LEFT: ICD-10-CM

## 2024-10-07 DIAGNOSIS — E10.69 TYPE 1 DIABETES MELLITUS WITH OTHER SPECIFIED COMPLICATION (H): ICD-10-CM

## 2024-10-07 DIAGNOSIS — S72.001D CLOSED FRACTURE OF RIGHT HIP WITH ROUTINE HEALING, SUBSEQUENT ENCOUNTER: Primary | ICD-10-CM

## 2024-10-07 DIAGNOSIS — N17.9 AKI (ACUTE KIDNEY INJURY) (H): ICD-10-CM

## 2024-10-07 PROCEDURE — 99309 SBSQ NF CARE MODERATE MDM 30: CPT | Performed by: FAMILY MEDICINE

## 2024-10-07 NOTE — LETTER
10/7/2024      Cam Hernandez  [address] [facility] [address]        [facility] GERIATRICS  [place] Medical Record Number:  [id number]  Place of Service where encounter took place: [facility] (TCU) [[id number]]  CODE STATUS:   DNR / DNI      Chief Complaint/Reason for Visit:  Chief Complaint   Patient presents with     RECHECK     TCU 10/7/2024.        TCU HPI:    Cam Hernandez is a 74 year old male with hx of IDDM presented to the hospital on 9/14/2024 after a fall in which he sustained a right hip fracture as noted below.    [facility]  Hospitalist Discharge Summary    Date of Admission:  9/14/2024  Date of Discharge:  9/17/2024     Hospital Course  74-year-old diabetic on insulin pump with diabetic neuropathy who presented with right hip pain and was found to have right intertrochanteric hip fracture and underwent ORIF on 9/14.  Patient denies any postoperative pain.     Type 1 diabetes on insulin pump  -Blood sugars reasonably well-controlled      ARF  -- Creatinine increased to 1.3  -Creatinine improved with normal saline bolus  --        Creatinine   Date Value Ref Range Status   09/16/2024 1.33 (H) 0.67 - 1.17 mg/dL Final   -- Baseline creatinine of 1.2     Leukocytosis likely reactive  -- Trending down  -- No evidence of DVT        WBC Count   Date Value Ref Range Status   09/15/2024 13.9 (H) 4.0 - 11.0 10e3/uL Final   -- Echo is unremarkable     Left hip pain noted on 9/16  -- x-ray of the left hip which showed degenerative changes  -- No fracture or dislocation  -- Continue physical therapy.     Acute urinary retention  -- Failed weaning trial.  Allison's inserted on 9/15  -- Trial of voiding in 1 week     Other medical conditions include  Hyperlipidemia, obesity, diabetic neuropathy,?Sclerosis     Overall stabilized and discharged to TCU on 9/17/2024 for PT, OT, nursing cares, medical management and monitoring.     Today:  Continuing efforts as able with  "therapies, restricted WB per ortho visit on 10/2/2024, \"flat foot WB with walker x 4 weeks\". Unclear when follow up is. He has moore in place having failed TOV and moore reinserted in TCU on 9/26/2024. Has urology appt on 10/15/2024. He denies abdominal pain. No SOB, chest pain. Appetite is good. No complaints of constipation. Has not been sleeping well and the facility just changed out his bed so he is hopeful that will help.       PAST MEDICAL HISTORY:  Past Medical History:   Diagnosis Date     Chronic kidney disease      Chronic osteoarthritis      Type 1 diabetes (H)        MEDICATIONS:  Current Outpatient Medications   Medication Sig Dispense Refill     acetaminophen (TYLENOL) 325 MG tablet Take 3 tablets (975 mg) by mouth every 8 hours. 100 tablet 0     aspirin 81 MG EC tablet Take 1 tablet (81 mg) by mouth 2 times daily. 60 tablet 0     Barberry-Oreg Grape-Goldenseal (BERBERINE COMPLEX PO) Take 1 capsule by mouth daily       blood glucose (NO BRAND SPECIFIED) test strip Use to test blood sugar 6-7 times daily or as directed.       Cholecalciferol (VITAMIN D-3) 125 MCG (5000 UT) TABS Take 5,000 Units by mouth daily        Continuous Glucose  (DEXCOM G7 ) KASSANDRA USE AS DIRECTED PER  INSTRUCTIONS       Continuous Glucose Sensor (DEXCOM G7 SENSOR) MISC USE AS DIRECTED PER  INSTRUCTIONS. REPLACE SENSOR EVERY 10 DAYS       diclofenac (VOLTAREN) 1 % topical gel Apply 4 g topically 4 times daily. Apply to the LEFT hip/groin area (not right) 50 g 0     HERBALS Take 1 each by mouth daily (Mushroom complex 6)       insulin lispro (HUMALOG VIAL) 100 UNIT/ML vial Inject subcutaneously 3 times daily (before meals). INJECT 60 UNITS UNDER THE SKIN AS DIRECTED. BOLUS UP TO 30 UNITS DAILY.  UNITS DAILY. USE WITH INSULIN PUMP       INSULIN PUMP - OUTPATIENT Per Endocrinology  Medtronic       Multiple Vitamins-Minerals (MULTIVITAMIN MEN) TABS Take 1 tablet by mouth daily       " "nitroFURantoin macrocrystal-monohydrate (MACROBID) 100 MG capsule Take 1 capsule (100 mg) by mouth 2 times daily for 5 days. 10 capsule 0     oxyCODONE (ROXICODONE) 5 MG tablet Take 1 tablet (5 mg) by mouth every 4 hours as needed for moderate pain. 30 tablet 0     polyethylene glycol (MIRALAX) 17 GM/Dose powder Take 1 capful. by mouth daily as needed.       pravastatin (PRAVACHOL) 40 MG tablet Take 40 mg by mouth daily.       probiotic CAPS Take 1 capsule by mouth daily (Zenwise- pre-and pro-biotic)       senna-docusate (SENOKOT-S/PERICOLACE) 8.6-50 MG tablet Take 2 tablets by mouth 2 times daily as needed for constipation. 60 tablet 0        PHYSICAL EXAM:  General: Patient is alert male, no distress.   Vitals: /66   Pulse 97   Temp 97.9  F (36.6  C)   Resp 18   Ht 1.854 m (6' 1\")   Wt 119.7 kg (264 lb)   SpO2 98%   BMI 34.83 kg/m    HEENT: Head is NCAT. Eyes show no injection or icterus. Nares negative. Oropharynx well hydrated.  Neck: No JVD.  Lungs: Non labored respirations.   : Allison with clear yellow urine.  Extremities: No edema.  Musculoskeletal: Degen changes.   Skin: Warm and dry.   Psych: Mood appears good.      LABS/DIAGNOSTIC DATA:  Component      Latest Ref [facility] 9/14/2024  10:36 AM 9/15/2024  5:47 AM 9/16/2024  6:08 AM 9/20/2024  6:09 AM   Hemoglobin      13.3 - 17.7 g/dL 14.6  13.3  11.1 (L)  11.4 (L)    WBC      4.0 - 11.0 10e3/uL 14.7 (H)  13.9 (H)   7.6    RBC Count      4.40 - 5.90 10e6/uL 4.88  4.41   3.81 (L)    Hematocrit      40.0 - 53.0 % 44.4  40.4   36.3 (L)    MCV      78 - 100 fL 91  92   95    MCH      26.5 - 33.0 pg 29.9  30.2   29.9    MCHC      31.5 - 36.5 g/dL 32.9  32.9   31.4 (L)    RDW      10.0 - 15.0 % 13.3  13.3   13.4    Platelet Count      150 - 450 10e3/uL 300  287   338       Component      Latest Ref Rng 9/14/2024  10:36 AM 9/15/2024  5:47 AM 9/16/2024  6:08 AM 9/20/2024  6:09 AM   Sodium      135 - 145 mmol/L 142  136   140    Anion Gap      7 - 15 mmol/L 9 " " 12   7    Calcium      8.8 - 10.4 mg/dL 8.7 (L)  8.6 (L)   8.6 (L)    Creatinine      0.67 - 1.17 mg/dL 1.17  1.39 (H)  1.33 (H)  1.13    GFR Estimate      >60 mL/min/1.73m2 65  53 (L)  56 (L)  68    Potassium      3.4 - 5.3 mmol/L 4.2  4.6   4.6    Carbon Dioxide (CO2)      22 - 29 mmol/L 27  23   29    Urea Nitrogen      8.0 - 23.0 mg/dL 22.6  27.6 (H)   20.9    Chloride      98 - 107 mmol/L 106  101   104         ASSESSMENT/PLAN:  Right hip fracture. S/p 9/14/2024 Right hip trochanteric nailing with cephalomedullary device. Saw ortho on 10/2/2024, \"flat foot WB with walker x 4 weeks\". Follow up appt in office for further guidance.   Left hip pain. Known left hip DJD. Working with therapy as able.   ABLA. Labs as noted.  CKD.   DM. Insulin lispro with meals. Has insulin pump. Follow accuchecks.  Urinary retention. Failed TOV, moore replaced 9/26/2024, follow up with urology, appt on 10/15/2024.       Electronically signed by: Estrellita Abel MD       Sincerely,        Estrellita Abel MD      "

## 2024-10-07 NOTE — PROGRESS NOTES
[facility] GERIATRICS  [place] Medical Record Number:  [id number]  Place of Service where encounter took place: [facility] ([facility]) [[id number]]  CODE STATUS:   DNR / DNI      Chief Complaint/Reason for Visit:  Chief Complaint   Patient presents with    Hospital F/U       HPI:    Cam Hernandez is a 74 year old male with hx of IDDM presented to the hospital on 9/14/2024 after a fall in which he sustained a right hip fracture as noted below.    [facility]  Hospitalist Discharge Summary    Date of Admission:  9/14/2024  Date of Discharge:  9/17/2024     Hospital Course  74-year-old diabetic on insulin pump with diabetic neuropathy who presented with right hip pain and was found to have right intertrochanteric hip fracture and underwent ORIF on 9/14.  Patient denies any postoperative pain.     Type 1 diabetes on insulin pump  -Blood sugars reasonably well-controlled      ARF  -- Creatinine increased to 1.3  -Creatinine improved with normal saline bolus  --        Creatinine   Date Value Ref Range Status   09/16/2024 1.33 (H) 0.67 - 1.17 mg/dL Final   -- Baseline creatinine of 1.2     Leukocytosis likely reactive  -- Trending down  -- No evidence of DVT        WBC Count   Date Value Ref Range Status   09/15/2024 13.9 (H) 4.0 - 11.0 10e3/uL Final   -- Echo is unremarkable     Left hip pain noted on 9/16  -- x-ray of the left hip which showed degenerative changes  -- No fracture or dislocation  -- Continue physical therapy.     Acute urinary retention  -- Failed weaning trial.  Moore's inserted on 9/15  -- Trial of voiding in 1 week     Other medical conditions include  Hyperlipidemia, obesity, diabetic neuropathy,?Sclerosis     Overall stabilized and discharged to TCU on 9/17/2024 for PT, OT, nursing cares, medical management and monitoring.     Today:  He is doing ok, has WB restriction per ortho. On aspirin for DVT prevention. Has diabetes, insulin pump. Came to TCU with moore, failed  voiding trial in the hospital, moore removed yesterday for TOV in TCU. PVRs will be checked. No known prior hx of prostate problems per his report. He denies abdominal pain. No fever, cough, congestion. No SOB at rest, chest pain, dizziness. Has left hip pain, DJD, limits his progress in therapy. No new vision or hearing concerns. Lives with his son.      REVIEW OF SYSTEMS:  All others negative other than those noted in HPI.      PAST MEDICAL HISTORY:  Past Medical History:   Diagnosis Date    Chronic kidney disease     Chronic osteoarthritis     Type 1 diabetes (H)        PAST SURGICAL HISTORY:  Past Surgical History:   Procedure Laterality Date    OPEN REDUCTION INTERNAL FIXATION HIP NAILING Right 2024    Procedure: INTERNAL FIXATION, FRACTURE, TROCHANTERIC, HIP, USING NAIL;  Surgeon: Son Talavera MD;  Location: [facility] Main OR       FAMILY HISTORY:  No family history on file.      SOCIAL HISTORY:  Social History     Socioeconomic History    Marital status:      Spouse name: Not on file    Number of children: Not on file    Years of education: Not on file    Highest education level: Not on file   Occupational History    Not on file   Tobacco Use    Smoking status: Former     Types: Cigars     Quit date:      Years since quittin.7    Smokeless tobacco: Never   Substance and Sexual Activity    Alcohol use: Not on file    Drug use: Not on file    Sexual activity: Not on file   Other Topics Concern    Parent/sibling w/ CABG, MI or angioplasty before 65F 55M? Not Asked   Social History Narrative    Not on file     Social Determinants of Health     Financial Resource Strain: Low Risk  (2024)    Financial Resource Strain     Within the past 12 months, have you or your family members you live with been unable to get utilities (heat, electricity) when it was really needed?: No   Food Insecurity: Low Risk  (2024)    Food Insecurity     Within the past 12 months, did you worry that your  food would run out before you got money to buy more?: No     Within the past 12 months, did the food you bought just not last and you didn t have money to get more?: No   Transportation Needs: Low Risk  (9/14/2024)    Transportation Needs     Within the past 12 months, has lack of transportation kept you from medical appointments, getting your medicines, non-medical meetings or appointments, work, or from getting things that you need?: No   Physical Activity: Not on file   Stress: Not on file   Social Connections: Unknown (1/1/2022)    Received from [facility]    Social Connections     Frequency of Communication with Friends and Family: Not on file   Interpersonal Safety: Low Risk  (9/14/2024)    Interpersonal Safety     Do you feel physically and emotionally safe where you currently live?: Yes     Within the past 12 months, have you been hit, slapped, kicked or otherwise physically hurt by someone?: No     Within the past 12 months, have you been humiliated or emotionally abused in other ways by your partner or ex-partner?: No   Housing Stability: Low Risk  (9/14/2024)    Housing Stability     Do you have housing? : Yes     Are you worried about losing your housing?: No       MEDICATIONS:  Current Outpatient Medications   Medication Sig Dispense Refill    acetaminophen (TYLENOL) 325 MG tablet Take 3 tablets (975 mg) by mouth every 8 hours. 100 tablet 0    aspirin 81 MG EC tablet Take 1 tablet (81 mg) by mouth 2 times daily. 60 tablet 0    Barberry-Oreg Grape-Goldenseal (BERBERINE COMPLEX PO) Take 1 capsule by mouth daily      blood glucose (NO BRAND SPECIFIED) test strip Use to test blood sugar 6-7 times daily or as directed.      Cholecalciferol (VITAMIN D-3) 125 MCG (5000 UT) TABS Take 5,000 Units by mouth daily       diclofenac (VOLTAREN) 1 % topical gel Apply 4 g topically 4 times daily. Apply to the LEFT hip/groin area (not right) 50 g 0    HERBALS Take 1 each by mouth daily  "(Mushroom complex 6)      insulin lispro (HUMALOG VIAL) 100 UNIT/ML vial Inject subcutaneously 3 times daily (before meals). INJECT 60 UNITS UNDER THE SKIN AS DIRECTED. BOLUS UP TO 30 UNITS DAILY.  UNITS DAILY. USE WITH INSULIN PUMP      INSULIN PUMP - OUTPATIENT Per Endocrinology  Medtronic      Multiple Vitamins-Minerals (MULTIVITAMIN MEN) TABS Take 1 tablet by mouth daily      oxyCODONE (ROXICODONE) 5 MG tablet Take 1 tablet (5 mg) by mouth every 4 hours as needed for moderate pain. 30 tablet 0    polyethylene glycol (MIRALAX) 17 GM/Dose powder Take 1 capful. by mouth daily as needed.      pravastatin (PRAVACHOL) 40 MG tablet Take 40 mg by mouth daily.      probiotic CAPS Take 1 capsule by mouth daily (Zenwise- pre-and pro-biotic)      senna-docusate (SENOKOT-S/PERICOLACE) 8.6-50 MG tablet Take 2 tablets by mouth 2 times daily as needed for constipation. 60 tablet 0        ALLERGIES:  Allergies   Allergen Reactions    Ammonium Lac Itching     Legs very red, excessive itching    Amoxicillin Nausea     Other reaction(s): nausea    Erythromycin Nausea    Naprosyn [Naproxen] Unknown     9/14/24 taking aspirin 81 mg pta/[facility]       PHYSICAL EXAM:  General: Patient is alert male, no distress.   Vitals: BP (!) 140/80   Pulse 80   Temp 97.7  F (36.5  C)   Resp 18   Ht 1.854 m (6' 1\")   Wt 120.5 kg (265 lb 9.6 oz)   SpO2 96%   BMI 35.04 kg/m    HEENT: Head is NCAT. Eyes show no injection or icterus. Nares negative. Oropharynx well hydrated.  Neck: Supple. No tenderness or adenopathy. No JVD.  Lungs: Clear bilaterally. No wheezes.  Cardiovascular: Regular rate and rhythm, normal S1, S2.  Back: No spinal or CVA tenderness.  Abdomen: Soft, no tenderness on exam. Bowel sounds present. No guarding rebound or rigidity.  : Deferred.  Extremities: No edema is noted.  Musculoskeletal: Degen changes.   Skin: Bandage right hip.   Psych: Mood appears good.      LABS/DIAGNOSTIC DATA:  Component      " "Latest Ref Rn 9/14/2024  10:36 AM 9/15/2024  5:47 AM 9/16/2024  6:08 AM 9/20/2024  6:09 AM   Hemoglobin      13.3 - 17.7 g/dL 14.6  13.3  11.1 (L)  11.4 (L)    WBC      4.0 - 11.0 10e3/uL 14.7 (H)  13.9 (H)   7.6    RBC Count      4.40 - 5.90 10e6/uL 4.88  4.41   3.81 (L)    Hematocrit      40.0 - 53.0 % 44.4  40.4   36.3 (L)    MCV      78 - 100 fL 91  92   95    MCH      26.5 - 33.0 pg 29.9  30.2   29.9    MCHC      31.5 - 36.5 g/dL 32.9  32.9   31.4 (L)    RDW      10.0 - 15.0 % 13.3  13.3   13.4    Platelet Count      150 - 450 10e3/uL 300  287   338       Component      Latest Ref Rn 9/14/2024  10:36 AM 9/15/2024  5:47 AM 9/16/2024  6:08 AM 9/20/2024  6:09 AM   Sodium      135 - 145 mmol/L 142  136   140    Anion Gap      7 - 15 mmol/L 9  12   7    Calcium      8.8 - 10.4 mg/dL 8.7 (L)  8.6 (L)   8.6 (L)    Creatinine      0.67 - 1.17 mg/dL 1.17  1.39 (H)  1.33 (H)  1.13    GFR Estimate      >60 mL/min/1.73m2 65  53 (L)  56 (L)  68    Potassium      3.4 - 5.3 mmol/L 4.2  4.6   4.6    Carbon Dioxide (CO2)      22 - 29 mmol/L 27  23   29    Urea Nitrogen      8.0 - 23.0 mg/dL 22.6  27.6 (H)   20.9    Chloride      98 - 107 mmol/L 106  101   104         ASSESSMENT/PLAN:  Right hip fracture. S/p 9/14/2024 Right hip trochanteric nailing with cephalomedullary device. \"Flat foot\" WB at 50% with a walker AAT for 6 weeks. Follow up with orthopedics.   ABLA. Mild, no need for transfusion.  Left hip pain. Known DJD. Potential limiting factor for therapies.   SARIAH. On CKD. Labs as noted above, reviewed. Recheck BMP in TCU.  DM. Insulin lispro with meals. Has insulin pump. Accuchecks followed closely.   Urinary retention. TOV in TCU underway, moore removed yesterday.   HLD. On pravastatin.   Diabetic neuropathy.   Code status is DNR/DNI.        Electronically signed by: Estrellita Abel MD     "

## 2024-10-07 NOTE — PROGRESS NOTES
[facility] GERIATRICS  [place] Medical Record Number:  [id number]  Place of Service where encounter took place: [facility] (TCU) [[id number]]  CODE STATUS:   DNR / DNI      Chief Complaint/Reason for Visit:  Chief Complaint   Patient presents with    RECHECK     TCU 9/30/2024.        TCU HPI:    Cam Hernandez is a 74 year old male with hx of IDDM presented to the hospital on 9/14/2024 after a fall in which he sustained a right hip fracture as noted below.    [facility]  Hospitalist Discharge Summary    Date of Admission:  9/14/2024  Date of Discharge:  9/17/2024     Hospital Course  74-year-old diabetic on insulin pump with diabetic neuropathy who presented with right hip pain and was found to have right intertrochanteric hip fracture and underwent ORIF on 9/14.  Patient denies any postoperative pain.     Type 1 diabetes on insulin pump  -Blood sugars reasonably well-controlled      ARF  -- Creatinine increased to 1.3  -Creatinine improved with normal saline bolus  --        Creatinine   Date Value Ref Range Status   09/16/2024 1.33 (H) 0.67 - 1.17 mg/dL Final   -- Baseline creatinine of 1.2     Leukocytosis likely reactive  -- Trending down  -- No evidence of DVT        WBC Count   Date Value Ref Range Status   09/15/2024 13.9 (H) 4.0 - 11.0 10e3/uL Final   -- Echo is unremarkable     Left hip pain noted on 9/16  -- x-ray of the left hip which showed degenerative changes  -- No fracture or dislocation  -- Continue physical therapy.     Acute urinary retention  -- Failed weaning trial.  Moore's inserted on 9/15  -- Trial of voiding in 1 week     Other medical conditions include  Hyperlipidemia, obesity, diabetic neuropathy,?Sclerosis     Overall stabilized and discharged to TCU on 9/17/2024 for PT, OT, nursing cares, medical management and monitoring.     Today:  Continuing efforts in therapy, WB restriction per ortho. Follow up appt 10/2/2024. Came to TCU with moore, failed TOV,  moore replaced on 9/26/2024 due to significantly elevated PVRs and discomfort. Urology appt to be made. He denies abdominal pain. No fever, cough, congestion. No SOB at rest, chest pain, dizziness. Has left hip pain, DJD, not new.       PAST MEDICAL HISTORY:  Past Medical History:   Diagnosis Date    Chronic kidney disease     Chronic osteoarthritis     Type 1 diabetes (H)        MEDICATIONS:  Current Outpatient Medications   Medication Sig Dispense Refill    acetaminophen (TYLENOL) 325 MG tablet Take 3 tablets (975 mg) by mouth every 8 hours. 100 tablet 0    aspirin 81 MG EC tablet Take 1 tablet (81 mg) by mouth 2 times daily. 60 tablet 0    Barberry-Oreg Grape-Goldenseal (BERBERINE COMPLEX PO) Take 1 capsule by mouth daily      blood glucose (NO BRAND SPECIFIED) test strip Use to test blood sugar 6-7 times daily or as directed.      Cholecalciferol (VITAMIN D-3) 125 MCG (5000 UT) TABS Take 5,000 Units by mouth daily       diclofenac (VOLTAREN) 1 % topical gel Apply 4 g topically 4 times daily. Apply to the LEFT hip/groin area (not right) 50 g 0    HERBALS Take 1 each by mouth daily (Mushroom complex 6)      insulin lispro (HUMALOG VIAL) 100 UNIT/ML vial Inject subcutaneously 3 times daily (before meals). INJECT 60 UNITS UNDER THE SKIN AS DIRECTED. BOLUS UP TO 30 UNITS DAILY.  UNITS DAILY. USE WITH INSULIN PUMP      INSULIN PUMP - OUTPATIENT Per Endocrinology  Medtronic      Multiple Vitamins-Minerals (MULTIVITAMIN MEN) TABS Take 1 tablet by mouth daily      oxyCODONE (ROXICODONE) 5 MG tablet Take 1 tablet (5 mg) by mouth every 4 hours as needed for moderate pain. 30 tablet 0    polyethylene glycol (MIRALAX) 17 GM/Dose powder Take 1 capful. by mouth daily as needed.      pravastatin (PRAVACHOL) 40 MG tablet Take 40 mg by mouth daily.      probiotic CAPS Take 1 capsule by mouth daily (Zenwise- pre-and pro-biotic)      senna-docusate (SENOKOT-S/PERICOLACE) 8.6-50 MG tablet Take 2 tablets by mouth 2 times daily  "as needed for constipation. 60 tablet 0        PHYSICAL EXAM:  General: Patient is alert male, no distress.   Vitals: /57   Pulse 89   Temp 97.3  F (36.3  C)   Resp 18   Ht 1.854 m (6' 1\")   Wt 120.2 kg (265 lb)   SpO2 95%   BMI 34.96 kg/m    HEENT: Head is NCAT. Eyes show no injection or icterus. Nares negative. Oropharynx well hydrated.  Neck: No JVD.  Lungs: Non labored respirations.   : Allison.  Extremities: No signif distal edema is noted.  Musculoskeletal: Degen changes.   Skin: Bandage right hip.   Psych: Mood appears good.      LABS/DIAGNOSTIC DATA:  Component      Latest Ref [facility] 9/14/2024  10:36 AM 9/15/2024  5:47 AM 9/16/2024  6:08 AM 9/20/2024  6:09 AM   Hemoglobin      13.3 - 17.7 g/dL 14.6  13.3  11.1 (L)  11.4 (L)    WBC      4.0 - 11.0 10e3/uL 14.7 (H)  13.9 (H)   7.6    RBC Count      4.40 - 5.90 10e6/uL 4.88  4.41   3.81 (L)    Hematocrit      40.0 - 53.0 % 44.4  40.4   36.3 (L)    MCV      78 - 100 fL 91  92   95    MCH      26.5 - 33.0 pg 29.9  30.2   29.9    MCHC      31.5 - 36.5 g/dL 32.9  32.9   31.4 (L)    RDW      10.0 - 15.0 % 13.3  13.3   13.4    Platelet Count      150 - 450 10e3/uL 300  287   338       Component      Latest Ref Rn 9/14/2024  10:36 AM 9/15/2024  5:47 AM 9/16/2024  6:08 AM 9/20/2024  6:09 AM   Sodium      135 - 145 mmol/L 142  136   140    Anion Gap      7 - 15 mmol/L 9  12   7    Calcium      8.8 - 10.4 mg/dL 8.7 (L)  8.6 (L)   8.6 (L)    Creatinine      0.67 - 1.17 mg/dL 1.17  1.39 (H)  1.33 (H)  1.13    GFR Estimate      >60 mL/min/1.73m2 65  53 (L)  56 (L)  68    Potassium      3.4 - 5.3 mmol/L 4.2  4.6   4.6    Carbon Dioxide (CO2)      22 - 29 mmol/L 27  23   29    Urea Nitrogen      8.0 - 23.0 mg/dL 22.6  27.6 (H)   20.9    Chloride      98 - 107 mmol/L 106  101   104         ASSESSMENT/PLAN:  Right hip fracture. S/p 9/14/2024 Right hip trochanteric nailing with cephalomedullary device. \"Flat foot\" WB at 50% with a walker AAT for 6 weeks. Follow up " with orthopedics, appt 10/2/2024. Known left hip DJD.  ABLA. Mild, labs as noted above.  SARIAH. On CKD. Labs as above. Recheck BMP in TCU.  DM. Insulin lispro with meals. Has insulin pump. Accuchecks followed closely.   Urinary retention. Failed TOV, moore replaced 9/26/2024, follow up with urology.          Electronically signed by: Estrellita Abel MD

## 2024-10-10 ENCOUNTER — LAB REQUISITION (OUTPATIENT)
Dept: LAB | Facility: CLINIC | Age: 74
End: 2024-10-10
Payer: COMMERCIAL

## 2024-10-10 ENCOUNTER — TRANSITIONAL CARE UNIT VISIT (OUTPATIENT)
Dept: GERIATRICS | Facility: CLINIC | Age: 74
End: 2024-10-10
Payer: COMMERCIAL

## 2024-10-10 VITALS
BODY MASS INDEX: 34.99 KG/M2 | HEIGHT: 73 IN | HEART RATE: 86 BPM | TEMPERATURE: 97.7 F | WEIGHT: 264 LBS | OXYGEN SATURATION: 96 % | SYSTOLIC BLOOD PRESSURE: 161 MMHG | DIASTOLIC BLOOD PRESSURE: 72 MMHG | RESPIRATION RATE: 18 BRPM

## 2024-10-10 DIAGNOSIS — E10.69 TYPE 1 DIABETES MELLITUS WITH OTHER SPECIFIED COMPLICATION (H): ICD-10-CM

## 2024-10-10 DIAGNOSIS — S72.001A HIP FRACTURE, RIGHT, CLOSED, INITIAL ENCOUNTER (H): ICD-10-CM

## 2024-10-10 DIAGNOSIS — N18.9 CHRONIC KIDNEY DISEASE, UNSPECIFIED: ICD-10-CM

## 2024-10-10 DIAGNOSIS — R33.9 URINARY RETENTION: ICD-10-CM

## 2024-10-10 DIAGNOSIS — N30.00 ACUTE CYSTITIS WITHOUT HEMATURIA: Primary | ICD-10-CM

## 2024-10-10 DIAGNOSIS — M16.51 POST-TRAUMATIC OSTEOARTHRITIS OF RIGHT HIP: ICD-10-CM

## 2024-10-10 DIAGNOSIS — M25.551 RIGHT HIP PAIN: ICD-10-CM

## 2024-10-10 DIAGNOSIS — R53.81 PHYSICAL DECONDITIONING: ICD-10-CM

## 2024-10-10 LAB
ALBUMIN UR-MCNC: 20 MG/DL
APPEARANCE UR: ABNORMAL
BILIRUB UR QL STRIP: NEGATIVE
COLOR UR AUTO: YELLOW
GLUCOSE UR STRIP-MCNC: NEGATIVE MG/DL
HGB UR QL STRIP: ABNORMAL
KETONES UR STRIP-MCNC: NEGATIVE MG/DL
LEUKOCYTE ESTERASE UR QL STRIP: ABNORMAL
MUCOUS THREADS #/AREA URNS LPF: PRESENT /LPF
NITRATE UR QL: NEGATIVE
PH UR STRIP: 7 [PH] (ref 5–7)
RBC URINE: 12 /HPF
SP GR UR STRIP: 1.01 (ref 1–1.03)
SQUAMOUS EPITHELIAL: 1 /HPF
UROBILINOGEN UR STRIP-MCNC: NORMAL MG/DL
WBC URINE: 94 /HPF

## 2024-10-10 PROCEDURE — 81001 URINALYSIS AUTO W/SCOPE: CPT

## 2024-10-10 PROCEDURE — 99310 SBSQ NF CARE HIGH MDM 45: CPT

## 2024-10-10 PROCEDURE — 99418 PROLNG IP/OBS E/M EA 15 MIN: CPT

## 2024-10-10 PROCEDURE — 87186 SC STD MICRODIL/AGAR DIL: CPT

## 2024-10-10 NOTE — PROGRESS NOTES
[facility] GERIATRICS    PRIMARY CARE PROVIDER AND CLINIC:  Soto Gaston MD, [address] [address] / [facility] [address]  Chief Complaint   Patient presents with    RECHECK      [place] Medical Record Number:  [id number]  Place of Service where encounter took place:  [facility]) [[id number]]    HPI: PMH  of type 1 diabetes, neuropathy, HLD, NBA with CPAP use, presented to emergency room with mechanical fall that led to right intertrochanteric  hip fracture post fall.  Orthopedic procedure completed on 9/14/2024 right hip trochanteric nailing with cephalomedullary device.He transferred to [facility] for rehab and  medical management.    History obtained from: facility chart records, facility staff, patient report, [facility] chart review, and Care Everywhere [facility] chart review        is seen in his room sitting on a chair . He reports he has been doing ok. He is encouraged to continue utilizing bowel medication. He is passing gas and eating drinking ok . Allison cath was replaced on 9/26 urine in the bag cloudy with increased sedimentation . Staff asked to change the bag and send UA/UC he has some discomfort .He is using EZ stand for transfer at this time. He denies any other acute issues today he manages his insulin pump independently.  Staff reports he is doing well with mechanical lift transfers.    CODE STATUS/ADVANCE DIRECTIVES DISCUSSION:  No CPR- Do NOT Intubate  CPR/Full code   ALLERGIES:   Allergies   Allergen Reactions    Ammonium Lac Itching     Legs very red, excessive itching    Amoxicillin Nausea     Other reaction(s): nausea    Erythromycin Nausea    Naprosyn [Naproxen] Unknown     9/14/24 taking aspirin 81 mg pta/given inpt at [facility]      PAST MEDICAL HISTORY:   Past Medical History:   Diagnosis Date    Chronic kidney disease     Chronic osteoarthritis     Type 1 diabetes (H)       PAST SURGICAL HISTORY:   has a past surgical history that includes Open reduction internal fixation hip  nailing (Right, 9/14/2024).  FAMILY HISTORY: family history is not on file.  SOCIAL HISTORY:   reports that he quit smoking about 11 years ago. His smoking use included cigars. He has never used smokeless tobacco.      Post Discharge Medication Reconciliation Status:   MED REC REQUIRED{  Post Medication Reconciliation Status: discharge medications reconciled and changed, per note/orders       Current Outpatient Medications   Medication Sig Dispense Refill    nitroFURantoin macrocrystal-monohydrate (MACROBID) 100 MG capsule Take 1 capsule (100 mg) by mouth 2 times daily for 5 days. 10 capsule 0    acetaminophen (TYLENOL) 325 MG tablet Take 3 tablets (975 mg) by mouth every 8 hours. 100 tablet 0    aspirin 81 MG EC tablet Take 1 tablet (81 mg) by mouth 2 times daily. 60 tablet 0    Barberry-Oreg Grape-Goldenseal (BERBERINE COMPLEX PO) Take 1 capsule by mouth daily      blood glucose (NO BRAND SPECIFIED) test strip Use to test blood sugar 6-7 times daily or as directed.      Cholecalciferol (VITAMIN D-3) 125 MCG (5000 UT) TABS Take 5,000 Units by mouth daily       diclofenac (VOLTAREN) 1 % topical gel Apply 4 g topically 4 times daily. Apply to the LEFT hip/groin area (not right) 50 g 0    HERBALS Take 1 each by mouth daily (Mushroom complex 6)      insulin lispro (HUMALOG VIAL) 100 UNIT/ML vial Inject subcutaneously 3 times daily (before meals). INJECT 60 UNITS UNDER THE SKIN AS DIRECTED. BOLUS UP TO 30 UNITS DAILY.  UNITS DAILY. USE WITH INSULIN PUMP      INSULIN PUMP - OUTPATIENT Per Endocrinology  [facility]      Multiple Vitamins-Minerals (MULTIVITAMIN MEN) TABS Take 1 tablet by mouth daily      oxyCODONE (ROXICODONE) 5 MG tablet Take 1 tablet (5 mg) by mouth every 4 hours as needed for moderate pain. 30 tablet 0    polyethylene glycol (MIRALAX) 17 GM/Dose powder Take 1 capful. by mouth daily as needed.      pravastatin (PRAVACHOL) 40 MG tablet Take 40 mg by mouth daily.      probiotic CAPS Take 1 capsule by  "mouth daily (Zenwise- pre-and pro-biotic)      senna-docusate (SENOKOT-S/PERICOLACE) 8.6-50 MG tablet Take 2 tablets by mouth 2 times daily as needed for constipation. 60 tablet 0     No current facility-administered medications for this visit.       ROS:  4 point ROS including Respiratory, CV, GI and , other than that noted in the HPI,  is negative    Vitals:  BP (!) 161/72   Pulse 86   Temp 97.7  F (36.5  C)   Resp 18   Ht 1.854 m (6' 1\")   Wt 119.7 kg (264 lb)   SpO2 96%   BMI 34.83 kg/m    Exam:  GENERAL APPEARANCE:  Alert, in no distress, morbidly obese  NECK:  No adenopathy,masses or thyromegaly  RESP:  respiratory effort and palpation of chest normal, lungs clear to auscultation , no respiratory distress  CV:  regular rate and rhythm, no murmur, rub, or gallop, trace edema BLE  ABDOMEN:  normal bowel sounds, soft, nontender, no hepatosplenomegaly or other masses, reports constipation, no guarding or rebound, bowel sounds normal  :    Moore removed trial of void unsuccessful , moore replaced 9/26 urology for follow.  M/S:   Using EZ Stand transfer  SKIN:  wound healing well, no signs of infection site is covered no sign of infection  NEURO:   Examination of sensation by touch normal  PSYCH:  oriented X 3    Lab/Diagnostic data:  Most Recent 3 CBC's:  Recent Labs   Lab Test 10/11/24  0523 09/20/24  0609 09/16/24  0608 09/15/24  0547   WBC 7.0 7.6  --  13.9*   HGB 11.3* 11.4* 11.1* 13.3   MCV 95 95  --  92    338  --  287     Most Recent 3 BMP's:  Recent Labs   Lab Test 09/20/24  0609 09/17/24  1210 09/17/24  0750 09/16/24  0748 09/16/24  0608 09/15/24  0710 09/15/24  0547 09/14/24  2033 09/14/24  1036     --   --   --   --   --  136  --  142   POTASSIUM 4.6  --   --   --   --   --  4.6  --  4.2   CHLORIDE 104  --   --   --   --   --  101  --  106   CO2 29  --   --   --   --   --  23  --  27   BUN 20.9  --   --   --   --   --  27.6*  --  22.6   CR 1.13  --   --   --  1.33*  --  1.39*  --  " 1.17   ANIONGAP 7  --   --   --   --   --  12  --  9   ETHEL 8.6*  --   --   --   --   --  8.6*  --  8.7*   * 118* 139*   < >  --    < > 200*   < > 142*    < > = values in this interval not displayed.       ASSESSMENT/PLAN:  (N30.00) Acute cystitis without hematuria  (primary encounter diagnosis)  (R33.9) Urinary retention    >100,000 CFU/mL Escherichia coli Abnormal            Resulting Agency: [facility]     Susceptibility    Escherichia coli (1)    Antibiotic Interpretation Sensitivity  Method Status    Ampicillin Susceptible 4 ug/mL LORRI Final    Ampicillin/ Sulbactam Susceptible <=2 ug/mL LORRI Final    Piperacillin/Tazobactam Susceptible <=4 ug/mL LORRI Final    Cefazolin Susceptible <=4 ug/mL LORRI Final     Cefazolin LORRI breakpoints are for the treatment of uncomplicated urinary tract infections. For the treatment of systemic infections, please contact the laboratory for additional testing.       Cefoxitin Susceptible <=4 ug/mL LORRI Final    Ceftazidime Susceptible <=1 ug/mL LORRI Final    Ceftriaxone Susceptible <=1 ug/mL LORRI Final    Cefepime Susceptible <=1 ug/mL LORRI Final    Gentamicin Susceptible <=1 ug/mL LORRI Final    Tobramycin Susceptible <=1 ug/mL LORRI Final    Ciprofloxacin Susceptible <=0.25 ug/mL LORRI Final    Levofloxacin Susceptible <=0.12 ug/mL LORRI Final    Nitrofurantoin Susceptible 32 ug/mL LORRI Final    Trimethoprim/Sulfamethoxazole Susceptible <=1/19 ug/mL LORRI Final              Plan:   - Start  (MACROBID) 100 MG capsule BID X5 days   - Follow up with urology for PVT on 10/15/24    (S72.001A) Hip fracture, right, closed,  (primary encounter diagnosis)  (M25.551) Right hip pain  (M16.51) Post-traumatic osteoarthritis of   (R53.81) Physical deconditioning  Comment: Acute on chronic right intertrochanteric hip fracture and underwent ORIF on 9/14/24 incision site covered and intact. Activity as tolerated since 10/02/2024 for 4 weeks   Plan:   - Pain managed with  APAP/Oxycodone   -Follow-up with [facility]  orthopedics in 4 weeks  -PT/OT eval and treat  - ASA for anticoagulation BID for 30 days       (E10.69) Type 1 diabetes mellitus with other specified complication (H)  Comment: no epidote of hyperglycinemia reported BS controlled  Last A1C 6.1  Plan:   - Continue Continues insuline use via self managed pump  -Education provided now that  he is working with PT to watch his blood sugar closely        Total time spent with patient visit at the [facility] nursing facility was 65 including patient visit, review of past records, and going back for Culture-result  and Abx order communicated with staff.       Orders:  - UA/UC   -Lab due 10/11/24 CBC w diff   Electronically signed by: RUTH Nunez CNP

## 2024-10-11 LAB
BACTERIA UR CULT: ABNORMAL
BASOPHILS # BLD AUTO: 0.1 10E3/UL (ref 0–0.2)
BASOPHILS NFR BLD AUTO: 1 %
EOSINOPHIL # BLD AUTO: 0.3 10E3/UL (ref 0–0.7)
EOSINOPHIL NFR BLD AUTO: 5 %
ERYTHROCYTE [DISTWIDTH] IN BLOOD BY AUTOMATED COUNT: 14.1 % (ref 10–15)
HCT VFR BLD AUTO: 36.3 % (ref 40–53)
HGB BLD-MCNC: 11.3 G/DL (ref 13.3–17.7)
IMM GRANULOCYTES # BLD: 0.1 10E3/UL
IMM GRANULOCYTES NFR BLD: 1 %
LYMPHOCYTES # BLD AUTO: 1.2 10E3/UL (ref 0.8–5.3)
LYMPHOCYTES NFR BLD AUTO: 17 %
MCH RBC QN AUTO: 29.4 PG (ref 26.5–33)
MCHC RBC AUTO-ENTMCNC: 31.1 G/DL (ref 31.5–36.5)
MCV RBC AUTO: 95 FL (ref 78–100)
MONOCYTES # BLD AUTO: 0.7 10E3/UL (ref 0–1.3)
MONOCYTES NFR BLD AUTO: 10 %
NEUTROPHILS # BLD AUTO: 4.6 10E3/UL (ref 1.6–8.3)
NEUTROPHILS NFR BLD AUTO: 66 %
NRBC # BLD AUTO: 0 10E3/UL
NRBC BLD AUTO-RTO: 0 /100
PLATELET # BLD AUTO: 442 10E3/UL (ref 150–450)
RBC # BLD AUTO: 3.84 10E6/UL (ref 4.4–5.9)
WBC # BLD AUTO: 7 10E3/UL (ref 4–11)

## 2024-10-11 PROCEDURE — 36415 COLL VENOUS BLD VENIPUNCTURE: CPT

## 2024-10-11 PROCEDURE — 85025 COMPLETE CBC W/AUTO DIFF WBC: CPT

## 2024-10-11 PROCEDURE — P9603 ONE-WAY ALLOW PRORATED MILES: HCPCS

## 2024-10-12 RX ORDER — NITROFURANTOIN 25; 75 MG/1; MG/1
100 CAPSULE ORAL 2 TIMES DAILY
Qty: 10 CAPSULE | Refills: 0 | Status: SHIPPED | OUTPATIENT
Start: 2024-10-12 | End: 2024-10-17

## 2024-10-12 RX ORDER — ACYCLOVIR 400 MG/1
TABLET ORAL
COMMUNITY
Start: 2024-08-06

## 2024-10-12 RX ORDER — ACYCLOVIR 400 MG/1
TABLET ORAL
COMMUNITY
Start: 2024-02-08

## 2024-10-14 ENCOUNTER — TRANSITIONAL CARE UNIT VISIT (OUTPATIENT)
Dept: GERIATRICS | Facility: CLINIC | Age: 74
End: 2024-10-14
Payer: COMMERCIAL

## 2024-10-14 VITALS
WEIGHT: 253.1 LBS | RESPIRATION RATE: 18 BRPM | BODY MASS INDEX: 33.54 KG/M2 | DIASTOLIC BLOOD PRESSURE: 69 MMHG | HEIGHT: 73 IN | HEART RATE: 80 BPM | OXYGEN SATURATION: 96 % | SYSTOLIC BLOOD PRESSURE: 147 MMHG | TEMPERATURE: 97.5 F

## 2024-10-14 DIAGNOSIS — M16.51 POST-TRAUMATIC OSTEOARTHRITIS OF RIGHT HIP: ICD-10-CM

## 2024-10-14 DIAGNOSIS — S72.001A HIP FRACTURE, RIGHT, CLOSED, INITIAL ENCOUNTER (H): ICD-10-CM

## 2024-10-14 DIAGNOSIS — N30.00 ACUTE CYSTITIS WITHOUT HEMATURIA: Primary | ICD-10-CM

## 2024-10-14 DIAGNOSIS — R33.9 URINARY RETENTION: ICD-10-CM

## 2024-10-14 DIAGNOSIS — E10.69 TYPE 1 DIABETES MELLITUS WITH OTHER SPECIFIED COMPLICATION (H): ICD-10-CM

## 2024-10-14 DIAGNOSIS — R53.81 PHYSICAL DECONDITIONING: ICD-10-CM

## 2024-10-14 DIAGNOSIS — M25.552 HIP PAIN, LEFT: ICD-10-CM

## 2024-10-14 PROCEDURE — 99309 SBSQ NF CARE MODERATE MDM 30: CPT

## 2024-10-14 NOTE — PROGRESS NOTES
[facility] GERIATRICS    PRIMARY CARE PROVIDER AND CLINIC:  Soto Gaston MD, [address] [address] / [facility] [address]  Chief Complaint   Patient presents with    RECHECK      [place] Medical Record Number:  [id number]  Place of Service where encounter took place:  [facility]) [[id number]]    HPI: PMH  of type 1 diabetes, neuropathy, HLD, NBA with CPAP use, presented to emergency room with mechanical fall that led to right intertrochanteric  hip fracture post fall.  Orthopedic procedure completed on 9/14/2024 right hip trochanteric nailing with cephalomedullary device.[facility] to [facility] for [facility]hab and  medical management.    History obtained from: facility chart records, facility staff, patient report, [facility] chart review, [facility]        is seen in his room sitting on a chair . He reports he has been doing ok. He is encouraged to drink more fluid given his recent UTI diagnosis . He has Allison cath which was replaced on 9/26  urology to see tomorrow. He is using EZ stand for transfer at this time. He lives alone he will need to transfer with walker to bathroom and chair prior to discharge.He denies any other acute issues today he manages his insulin pump independently.  Staff reports he is doing well with mechanical lift transfers.    CODE STATUS/ADVANCE DIRECTIVES DISCUSSION:  No CPR- Do NOT Intubate  CPR/Full code   ALLERGIES:   Allergies   Allergen Reactions    Ammonium Lac Itching     Legs very red, excessive itching    Amoxicillin Nausea     Other reaction(s): nausea    Erythromycin Nausea    Naprosyn [Naproxen] Unknown     9/14/24 taking aspirin 81 mg pta/given inpt at [facility]      PAST MEDICAL HISTORY:   Past Medical History:   Diagnosis Date    Chronic kidney disease     Chronic osteoarthritis     Type 1 diabetes (H)       PAST SURGICAL HISTORY:   has a past surgical history that includes Open reduction internal fixation hip nailing (Right,  9/14/2024).  FAMILY HISTORY: family history is not on file.  SOCIAL HISTORY:   reports that he quit smoking about 11 years ago. His smoking use included cigars. He has never used smokeless tobacco.      Post Discharge Medication Reconciliation Status:   MED REC REQUIRED{  Post Medication Reconciliation Status: discharge medications reconciled and changed, per note/orders       Current Outpatient Medications   Medication Sig Dispense Refill    acetaminophen (TYLENOL) 325 MG tablet Take 3 tablets (975 mg) by mouth every 8 hours. 100 tablet 0    aspirin 81 MG EC tablet Take 1 tablet (81 mg) by mouth 2 times daily. 60 tablet 0    Barberry-Oreg Grape-Goldenseal (BERBERINE COMPLEX PO) Take 1 capsule by mouth daily      blood glucose (NO BRAND SPECIFIED) test strip Use to test blood sugar 6-7 times daily or as directed.      Cholecalciferol (VITAMIN D-3) 125 MCG (5000 UT) TABS Take 5,000 Units by mouth daily       Continuous Glucose  (DEXCOM G7 ) KASSANDRA USE AS DIRECTED PER  INSTRUCTIONS      Continuous Glucose Sensor (DEXCOM G7 SENSOR) MISC USE AS DIRECTED PER  INSTRUCTIONS. REPLACE SENSOR EVERY 10 DAYS      diclofenac (VOLTAREN) 1 % topical gel Apply 4 g topically 4 times daily. Apply to the LEFT hip/groin area (not right) 50 g 0    HERBALS Take 1 each by mouth daily (Mushroom complex 6)      insulin lispro (HUMALOG VIAL) 100 UNIT/ML vial Inject subcutaneously 3 times daily (before meals). INJECT 60 UNITS UNDER THE SKIN AS DIRECTED. BOLUS UP TO 30 UNITS DAILY.  UNITS DAILY. USE WITH INSULIN PUMP      [facility]      Multiple Vitamins-Minerals (MULTIVITAMIN MEN) TABS Take 1 tablet by mouth daily      nitroFURantoin macrocrystal-monohydrate (MACROBID) 100 MG capsule Take 1 capsule (100 mg) by mouth 2 times daily for 5 days. 10 capsule 0    oxyCODONE (ROXICODONE) 5 MG tablet Take 1 tablet (5 mg) by mouth every 4 hours as needed for  "moderate pain. 30 tablet 0    polyethylene glycol (MIRALAX) 17 GM/Dose powder Take 1 capful. by mouth daily as needed.      pravastatin (PRAVACHOL) 40 MG tablet Take 40 mg by mouth daily.      probiotic CAPS Take 1 capsule by mouth daily (Zenwise- pre-and pro-biotic)      senna-docusate (SENOKOT-S/PERICOLACE) 8.6-50 MG tablet Take 2 tablets by mouth 2 times daily as needed for constipation. 60 tablet 0     No current facility-administered medications for this visit.       ROS:  4 point ROS including Respiratory, CV, GI and , other than that noted in the HPI,  is negative    Vitals:  BP (!) 147/69   Pulse 80   Temp 97.5  F (36.4  C)   Resp 18   Ht 1.854 m (6' 1\")   Wt 114.8 kg (253 lb 1.6 oz)   SpO2 96%   BMI 33.39 kg/m    Exam:  GENERAL APPEARANCE:  Alert, in no distress, morbidly obese  NECK:  No adenopathy,masses or thyromegaly  RESP:  respiratory effort and palpation of chest normal, lungs clear to auscultation , no respiratory distress  CV:  regular rate and rhythm, no murmur, rub, or gallop, trace edema BLE  ABDOMEN:  normal bowel sounds, soft, nontender, no hepatosplenomegaly or other masses, reports constipation, no guarding or rebound, bowel sounds normal  :    Moore removed trial of void unsuccessful , moore replaced 9/26 urology for follow on 10/15/24 UTI on abx for 5 days   M/S:   Using EZ Stand transfer  SKIN:  wound healing well, no signs of infection site is covered no sign of infection  NEURO:   Examination of sensation by touch normal  PSYCH:  oriented X 3    Lab/Diagnostic data:  Most Recent 3 CBC's:  Recent Labs   Lab Test 10/11/24  0523 09/20/24  0609 09/16/24  0608 09/15/24  0547   WBC 7.0 7.6  --  13.9*   HGB 11.3* 11.4* 11.1* 13.3   MCV 95 95  --  92    338  --  287     Most Recent 3 BMP's:  Recent Labs   Lab Test 09/20/24  0609 09/17/24  1210 09/17/24  0750 09/16/24  0748 09/16/24  0608 09/15/24  0710 09/15/24  0547 09/14/24 2033 09/14/24  1036     --   --   --   --   " --  136  --  142   POTASSIUM 4.6  --   --   --   --   --  4.6  --  4.2   CHLORIDE 104  --   --   --   --   --  101  --  106   CO2 29  --   --   --   --   --  23  --  27   BUN 20.9  --   --   --   --   --  27.6*  --  22.6   CR 1.13  --   --   --  1.33*  --  1.39*  --  1.17   ANIONGAP 7  --   --   --   --   --  12  --  9   ETHEL 8.6*  --   --   --   --   --  8.6*  --  8.7*   * 118* 139*   < >  --    < > 200*   < > 142*    < > = values in this interval not displayed.       ASSESSMENT/PLAN:  (N30.00) Acute cystitis without hematuria  (primary encounter diagnosis)  (R33.9) Urinary retention  100,000 CFU/mL Escherichia coli     Plan:   - Continue  (MACROBID) 100 MG capsule BID X unitil 10/17/24  - Continue to monitor for changes or worsening UTI symptoms   - Follow up with urology for PVT on 10/15/24    (S72.001A) Hip fracture, right, closed,  (primary encounter diagnosis)  (M25.551) Right hip pain  (M16.51) Post-traumatic osteoarthritis of   (R53.81) Physical deconditioning  Comment: Acute on chronic right intertrochanteric hip fracture and underwent ORIF on 9/14/24 incision site covered and intact. Activity as tolerated since 10/02/2024 for 4 weeks   Plan:   - Pain managed with  APAP/Oxycodone   -Follow-up with [facility] in 4 weeks  -PT/OT ongoing  - ASA for anticoagulation BID for 30 days       (E10.69) Type 1 diabetes mellitus with other specified complication (H)  Comment: no epidote of hyperglycemia reported BS controlled  Last A1C 6.1  Plan:   - Continue Continues insuline use via self managed pump  -Education provided now that  he is working with PT to watch his blood sugar closely              Electronically signed by: RUTH Nunez CNP

## 2024-10-14 NOTE — PROGRESS NOTES
"[facility] GERIATRICS  [place] Medical Record Number:  [id number]  Place of Service where encounter took place: [facility] ([facility]) [[id number]]  CODE STATUS:   DNR / DNI      Chief Complaint/Reason for Visit:  Chief Complaint   Patient presents with    RECHECK     TCU 10/7/2024.        TCU HPI:    Cam Hernandez is a 74 year old male with hx of IDDM presented to the hospital on 9/14/2024 after a fall in which he sustained a right hip fracture as noted below.    [facility]  Hospitalist Discharge Summary    Date of Admission:  9/14/2024  Date of Discharge:  9/17/2024     Hospital Course  74-year-old diabetic on insulin pump with diabetic neuropathy who presented with right hip pain and was found to have right intertrochanteric hip fracture and underwent ORIF on 9/14.  Patient denies any postoperative pain.     Type 1 diabetes on insulin pump  -Blood sugars reasonably well-controlled      ARF  -- Creatinine increased to 1.3  -Creatinine improved with normal saline bolus  --        Creatinine   Date Value Ref Range Status   09/16/2024 1.33 (H) 0.67 - 1.17 mg/dL Final   -- Baseline creatinine of 1.2     Leukocytosis likely reactive  -- Trending down  -- No evidence of DVT        WBC Count   Date Value Ref Range Status   09/15/2024 13.9 (H) 4.0 - 11.0 10e3/uL Final   -- Echo is unremarkable     Left hip pain noted on 9/16  -- x-ray of the left hip which showed degenerative changes  -- No fracture or dislocation  -- Continue physical therapy.     Acute urinary retention  -- Failed weaning trial.  Allison's inserted on 9/15  -- Trial of voiding in 1 week     Other medical conditions include  Hyperlipidemia, obesity, diabetic neuropathy,?Sclerosis     Overall stabilized and discharged to TCU on 9/17/2024 for PT, OT, nursing cares, medical management and monitoring.     Today:  Continuing efforts as able with therapies, restricted WB per ortho visit on 10/2/2024, \"flat foot WB with walker x 4 " "weeks\". Unclear when follow up is. He has moore in place having failed TOV and moore reinserted in TCU on 9/26/2024. Has urology appt on 10/15/2024. He denies abdominal pain. No SOB, chest pain. Appetite is good. No complaints of constipation. Has not been sleeping well and the facility just changed out his bed so he is hopeful that will help.       PAST MEDICAL HISTORY:  Past Medical History:   Diagnosis Date    Chronic kidney disease     Chronic osteoarthritis     Type 1 diabetes (H)        MEDICATIONS:  Current Outpatient Medications   Medication Sig Dispense Refill    acetaminophen (TYLENOL) 325 MG tablet Take 3 tablets (975 mg) by mouth every 8 hours. 100 tablet 0    aspirin 81 MG EC tablet Take 1 tablet (81 mg) by mouth 2 times daily. 60 tablet 0    Barberry-Oreg Grape-Goldenseal (BERBERINE COMPLEX PO) Take 1 capsule by mouth daily      blood glucose (NO BRAND SPECIFIED) test strip Use to test blood sugar 6-7 times daily or as directed.      Cholecalciferol (VITAMIN D-3) 125 MCG (5000 UT) TABS Take 5,000 Units by mouth daily       Continuous Glucose  (DEXCOM G7 ) KASSANDRA USE AS DIRECTED PER  INSTRUCTIONS      Continuous Glucose Sensor (DEXCOM G7 SENSOR) MISC USE AS DIRECTED PER  INSTRUCTIONS. REPLACE SENSOR EVERY 10 DAYS      diclofenac (VOLTAREN) 1 % topical gel Apply 4 g topically 4 times daily. Apply to the LEFT hip/groin area (not right) 50 g 0    HERBALS Take 1 each by mouth daily (Mushroom complex 6)      insulin lispro (HUMALOG VIAL) 100 UNIT/ML vial Inject subcutaneously 3 times daily (before meals). INJECT 60 UNITS UNDER THE SKIN AS DIRECTED. BOLUS UP TO 30 UNITS DAILY.  UNITS DAILY. USE WITH INSULIN PUMP      INSULIN PUMP - OUTPATIENT Per Endocrinology  [employer]      Multiple Vitamins-Minerals (MULTIVITAMIN MEN) TABS Take 1 tablet by mouth daily      nitroFURantoin macrocrystal-monohydrate (MACROBID) 100 MG capsule Take 1 capsule (100 mg) by mouth 2 times " "daily for 5 days. 10 capsule 0    oxyCODONE (ROXICODONE) 5 MG tablet Take 1 tablet (5 mg) by mouth every 4 hours as needed for moderate pain. 30 tablet 0    polyethylene glycol (MIRALAX) 17 GM/Dose powder Take 1 capful. by mouth daily as needed.      pravastatin (PRAVACHOL) 40 MG tablet Take 40 mg by mouth daily.      probiotic CAPS Take 1 capsule by mouth daily (Zenwise- pre-and pro-biotic)      senna-docusate (SENOKOT-S/PERICOLACE) 8.6-50 MG tablet Take 2 tablets by mouth 2 times daily as needed for constipation. 60 tablet 0        PHYSICAL EXAM:  General: Patient is alert male, no distress.   Vitals: /66   Pulse 97   Temp 97.9  F (36.6  C)   Resp 18   Ht 1.854 m (6' 1\")   Wt 119.7 kg (264 lb)   SpO2 98%   BMI 34.83 kg/m    HEENT: Head is NCAT. Eyes show no injection or icterus. Nares negative. Oropharynx well hydrated.  Neck: No JVD.  Lungs: Non labored respirations.   : Allison with clear yellow urine.  Extremities: No edema.  Musculoskeletal: Degen changes.   Skin: Warm and dry.   Psych: Mood appears good.      LABS/DIAGNOSTIC DATA:  Component      Latest Ref [facility] 9/14/2024  10:36 AM 9/15/2024  5:47 AM 9/16/2024  6:08 AM 9/20/2024  6:09 AM   Hemoglobin      13.3 - 17.7 g/dL 14.6  13.3  11.1 (L)  11.4 (L)    WBC      4.0 - 11.0 10e3/uL 14.7 (H)  13.9 (H)   7.6    RBC Count      4.40 - 5.90 10e6/uL 4.88  4.41   3.81 (L)    Hematocrit      40.0 - 53.0 % 44.4  40.4   36.3 (L)    MCV      78 - 100 fL 91  92   95    MCH      26.5 - 33.0 pg 29.9  30.2   29.9    MCHC      31.5 - 36.5 g/dL 32.9  32.9   31.4 (L)    RDW      10.0 - 15.0 % 13.3  13.3   13.4    Platelet Count      150 - 450 10e3/uL 300  287   338       Component      Latest Ref Rng 9/14/2024  10:36 AM 9/15/2024  5:47 AM 9/16/2024  6:08 AM 9/20/2024  6:09 AM   Sodium      135 - 145 mmol/L 142  136   140    Anion Gap      7 - 15 mmol/L 9  12   7    Calcium      8.8 - 10.4 mg/dL 8.7 (L)  8.6 (L)   8.6 (L)    Creatinine      0.67 - 1.17 mg/dL 1.17  " "1.39 (H)  1.33 (H)  1.13    GFR Estimate      >60 mL/min/1.73m2 65  53 (L)  56 (L)  68    Potassium      3.4 - 5.3 mmol/L 4.2  4.6   4.6    Carbon Dioxide (CO2)      22 - 29 mmol/L 27  23   29    Urea Nitrogen      8.0 - 23.0 mg/dL 22.6  27.6 (H)   20.9    Chloride      98 - 107 mmol/L 106  101   104         ASSESSMENT/PLAN:  Right hip fracture. S/p 9/14/2024 Right hip trochanteric nailing with cephalomedullary device. Saw ortho on 10/2/2024, \"flat foot WB with walker x 4 weeks\". Follow up appt in office for further guidance.   Left hip pain. Known left hip DJD. Working with therapy as able.   ABLA. Labs as noted.  CKD.   DM. Insulin lispro with meals. Has insulin pump. Follow accuchecks.  Urinary retention. Failed TOV, moore replaced 9/26/2024, follow up with urology, appt on 10/15/2024.       Electronically signed by: Estrellita Abel MD     "

## 2024-10-14 NOTE — LETTER
10/14/2024      Cam Hernandez  [address] [facility] [address]        [facility] GERIATRICS    PRIMARY CARE PROVIDER AND CLINIC:  Soto Gaston MD, [address] [address]  Chief Complaint   Patient presents with     RECHECK      [place] Medical Record Number:  [id number]  Place of Service where encounter took place:  [facility] ([facility]) [[id number]]    HPI: PMH  of type 1 diabetes, neuropathy, HLD, NBA with CPAP use, presented to emergency room with mechanical fall that led to right intertrochanteric  hip fracture post fall.  Orthopedic procedure completed on 9/14/2024 right hip trochanteric nailing with cephalomedullary device.He transferred to [facility] for rehab and  medical management.    History obtained from: facility chart records, facility staff, patient report, [facility] chart review, and [facility] chart review        is seen in his room sitting on a chair . He reports he has been doing ok. He is encouraged to drink more fluid given his recent UTI diagnosis . He has Allison cath which was replaced on 9/26  urology to see tomorrow. He is using EZ stand for transfer at this time. He lives alone he will need to transfer with walker to bathroom and chair prior to discharge.He denies any other acute issues today he manages his insulin pump independently.  Staff reports he is doing well with mechanical lift transfers.    CODE STATUS/ADVANCE DIRECTIVES DISCUSSION:  No CPR- Do NOT Intubate  CPR/Full code   ALLERGIES:   Allergies   Allergen Reactions     Ammonium Lac Itching     Legs very red, excessive itching     Amoxicillin Nausea     Other reaction(s): nausea     Erythromycin Nausea     Naprosyn [Naproxen] Unknown     9/14/24 taking aspirin 81 mg pta/given inpt at [facility]      PAST MEDICAL HISTORY:   Past Medical History:   Diagnosis Date     Chronic kidney disease      Chronic osteoarthritis      Type 1 diabetes (H)       PAST SURGICAL HISTORY:   has a past  surgical history that includes Open reduction internal fixation hip nailing (Right, 9/14/2024).  FAMILY HISTORY: family history is not on file.  SOCIAL HISTORY:   reports that he quit smoking about 11 years ago. His smoking use included cigars. He has never used smokeless tobacco.      Post Discharge Medication Reconciliation Status:   MED REC REQUIRED{  Post Medication Reconciliation Status: discharge medications reconciled and changed, per note/orders       Current Outpatient Medications   Medication Sig Dispense Refill     acetaminophen (TYLENOL) 325 MG tablet Take 3 tablets (975 mg) by mouth every 8 hours. 100 tablet 0     aspirin 81 MG EC tablet Take 1 tablet (81 mg) by mouth 2 times daily. 60 tablet 0     Barberry-Oreg Grape-Goldenseal (BERBERINE COMPLEX PO) Take 1 capsule by mouth daily       blood glucose (NO BRAND SPECIFIED) test strip Use to test blood sugar 6-7 times daily or as directed.       Cholecalciferol (VITAMIN D-3) 125 MCG (5000 UT) TABS Take 5,000 Units by mouth daily        Continuous Glucose  (DEXCOM G7 ) KASSANDRA USE AS DIRECTED PER  INSTRUCTIONS       Continuous Glucose Sensor (DEXCOM G7 SENSOR) MISC USE AS DIRECTED PER  INSTRUCTIONS. REPLACE SENSOR EVERY 10 DAYS       diclofenac (VOLTAREN) 1 % topical gel Apply 4 g topically 4 times daily. Apply to the LEFT hip/groin area (not right) 50 g 0     HERBALS Take 1 each by mouth daily (Mushroom complex 6)       insulin lispro (HUMALOG VIAL) 100 UNIT/ML vial Inject subcutaneously 3 times daily (before meals). INJECT 60 UNITS UNDER THE SKIN AS DIRECTED. BOLUS UP TO 30 UNITS DAILY.  UNITS DAILY. USE WITH INSULIN PUMP       INSULIN PUMP - OUTPATIENT Per Endocrinology  [facility]       Multiple Vitamins-Minerals (MULTIVITAMIN MEN) TABS Take 1 tablet by mouth daily       nitroFURantoin macrocrystal-monohydrate (MACROBID) 100 MG capsule Take 1 capsule (100 mg) by mouth 2 times daily for 5 days. 10 capsule 0      "oxyCODONE (ROXICODONE) 5 MG tablet Take 1 tablet (5 mg) by mouth every 4 hours as needed for moderate pain. 30 tablet 0     polyethylene glycol (MIRALAX) 17 GM/Dose powder Take 1 capful. by mouth daily as needed.       pravastatin (PRAVACHOL) 40 MG tablet Take 40 mg by mouth daily.       probiotic CAPS Take 1 capsule by mouth daily (Zenwise- pre-and pro-biotic)       senna-docusate (SENOKOT-S/PERICOLACE) 8.6-50 MG tablet Take 2 tablets by mouth 2 times daily as needed for constipation. 60 tablet 0     No current facility-administered medications for this visit.       ROS:  4 point ROS including Respiratory, CV, GI and , other than that noted in the HPI,  is negative    Vitals:  BP (!) 147/69   Pulse 80   Temp 97.5  F (36.4  C)   Resp 18   Ht 1.854 m (6' 1\")   Wt 114.8 kg (253 lb 1.6 oz)   SpO2 96%   BMI 33.39 kg/m    Exam:  GENERAL APPEARANCE:  Alert, in no distress, morbidly obese  NECK:  No adenopathy,masses or thyromegaly  RESP:  respiratory effort and palpation of chest normal, lungs clear to auscultation , no respiratory distress  CV:  regular rate and rhythm, no murmur, rub, or gallop, trace edema BLE  ABDOMEN:  normal bowel sounds, soft, nontender, no hepatosplenomegaly or other masses, reports constipation, no guarding or rebound, bowel sounds normal  :    Moore removed trial of void unsuccessful , moore replaced 9/26 urology for follow on 10/15/24 UTI on abx for 5 days   M/S:   Using EZ Stand transfer  SKIN:  wound healing well, no signs of infection site is covered no sign of infection  NEURO:   Examination of sensation by touch normal  PSYCH:  oriented X 3    Lab/Diagnostic data:  Most Recent 3 CBC's:  Recent Labs   Lab Test 10/11/24  0523 09/20/24  0609 09/16/24  0608 09/15/24  0547   WBC 7.0 7.6  --  13.9*   HGB 11.3* 11.4* 11.1* 13.3   MCV 95 95  --  92    338  --  287     Most Recent 3 BMP's:  Recent Labs   Lab Test 09/20/24  0609 09/17/24  1210 09/17/24  0750 09/16/24  0748 " 09/16/24  0608 09/15/24  0710 09/15/24  0547 09/14/24 2033 09/14/24  1036     --   --   --   --   --  136  --  142   POTASSIUM 4.6  --   --   --   --   --  4.6  --  4.2   CHLORIDE 104  --   --   --   --   --  101  --  106   CO2 29  --   --   --   --   --  23  --  27   BUN 20.9  --   --   --   --   --  27.6*  --  22.6   CR 1.13  --   --   --  1.33*  --  1.39*  --  1.17   ANIONGAP 7  --   --   --   --   --  12  --  9   ETHEL 8.6*  --   --   --   --   --  8.6*  --  8.7*   * 118* 139*   < >  --    < > 200*   < > 142*    < > = values in this interval not displayed.       ASSESSMENT/PLAN:  (N30.00) Acute cystitis without hematuria  (primary encounter diagnosis)  (R33.9) Urinary retention  100,000 CFU/mL Escherichia coli     Plan:   - Continue  (MACROBID) 100 MG capsule BID X unitil 10/17/24  - Continue to monitor for changes or worsening UTI symptoms   - Follow up with urology for PVT on 10/15/24    (S72.001A) Hip fracture, right, closed,  (primary encounter diagnosis)  (M25.551) Right hip pain  (M16.51) Post-traumatic osteoarthritis of   (R53.81) Physical deconditioning  Comment: Acute on chronic right intertrochanteric hip fracture and underwent ORIF on 9/14/24 incision site covered and intact. Activity as tolerated since 10/02/2024 for 4 weeks   Plan:   - Pain managed with  APAP/Oxycodone   -Follow-up with [place] orthopedics in 4 weeks  -PT/OT ongoing  - ASA for anticoagulation BID for 30 days       (E10.69) Type 1 diabetes mellitus with other specified complication (H)  Comment: no epidote of hyperglycemia reported BS controlled  Last A1C 6.1  Plan:   - Continue Continues insuline use via self managed pump  -Education provided now that  he is working with PT to watch his blood sugar closely              Electronically signed by: RUTH Nunez CNP           Sincerely,        RUTH Nunez CNP

## 2024-10-14 NOTE — PROGRESS NOTES
[facility] GERIATRICS  [place] Medical Record Number:  [id number]  Place of Service where encounter took place: [facility] ([facility]) [[id number]]  CODE STATUS:   DNR / DNI      Chief Complaint/Reason for Visit:  Chief Complaint   Patient presents with    RECHECK     TCU 10/3/2024.        TCU HPI:    Cam Hernandez is a 74 year old male with hx of IDDM presented to the hospital on 9/14/2024 after a fall in which he sustained a right hip fracture as noted below.    [facility]  Hospitalist Discharge Summary    Date of Admission:  9/14/2024  Date of Discharge:  9/17/2024     Hospital Course  74-year-old diabetic on insulin pump with diabetic neuropathy who presented with right hip pain and was found to have right intertrochanteric hip fracture and underwent ORIF on 9/14.  Patient denies any postoperative pain.     Type 1 diabetes on insulin pump  -Blood sugars reasonably well-controlled      ARF  -- Creatinine increased to 1.3  -Creatinine improved with normal saline bolus  --        Creatinine   Date Value Ref Range Status   09/16/2024 1.33 (H) 0.67 - 1.17 mg/dL Final   -- Baseline creatinine of 1.2     Leukocytosis likely reactive  -- Trending down  -- No evidence of DVT        WBC Count   Date Value Ref Range Status   09/15/2024 13.9 (H) 4.0 - 11.0 10e3/uL Final   -- Echo is unremarkable     Left hip pain noted on 9/16  -- x-ray of the left hip which showed degenerative changes  -- No fracture or dislocation  -- Continue physical therapy.     Acute urinary retention  -- Failed weaning trial.  Allison's inserted on 9/15  -- Trial of voiding in 1 week     Other medical conditions include  Hyperlipidemia, obesity, diabetic neuropathy,?Sclerosis     Overall stabilized and discharged to TCU on 9/17/2024 for PT, OT, nursing cares, medical management and monitoring.     Today:  He reports bilateral hip pain, right from surgery and left from known DJD. He has flat foot WB restriction, saw ortho on  "10/2/2024, \"flat foot WB with walker x 4 weeks\" ASA BID has no stop date, will be addressed at next ortho visit given limited mobility at this time. He has moore in place having failed TOV and moore reinserted in TCU on 9/26/2024. Has urology appt on 10/15/2024. He denies abdominal pain. No SOB, chest pain. Appetite is good. No complaints of constipation.       PAST MEDICAL HISTORY:  Past Medical History:   Diagnosis Date    Chronic kidney disease     Chronic osteoarthritis     Type 1 diabetes (H)        MEDICATIONS:  Current Outpatient Medications   Medication Sig Dispense Refill    acetaminophen (TYLENOL) 325 MG tablet Take 3 tablets (975 mg) by mouth every 8 hours. 100 tablet 0    aspirin 81 MG EC tablet Take 1 tablet (81 mg) by mouth 2 times daily. 60 tablet 0    Barberry-Oreg Grape-Goldenseal (BERBERINE COMPLEX PO) Take 1 capsule by mouth daily      blood glucose (NO BRAND SPECIFIED) test strip Use to test blood sugar 6-7 times daily or as directed.      Cholecalciferol (VITAMIN D-3) 125 MCG (5000 UT) TABS Take 5,000 Units by mouth daily       Continuous Glucose  (DEXCOM G7 ) KASSANDRA USE AS DIRECTED PER  INSTRUCTIONS      Continuous Glucose Sensor (DEXCOM G7 SENSOR) MISC USE AS DIRECTED PER  INSTRUCTIONS. REPLACE SENSOR EVERY 10 DAYS      diclofenac (VOLTAREN) 1 % topical gel Apply 4 g topically 4 times daily. Apply to the LEFT hip/groin area (not right) 50 g 0    HERBALS Take 1 each by mouth daily (Mushroom complex 6)      insulin lispro (HUMALOG VIAL) 100 UNIT/ML vial Inject subcutaneously 3 times daily (before meals). INJECT 60 UNITS UNDER THE SKIN AS DIRECTED. BOLUS UP TO 30 UNITS DAILY.  UNITS DAILY. USE WITH INSULIN PUMP      INSULIN PUMP - OUTPATIENT Per Endocrinology  [facility]      Multiple Vitamins-Minerals (MULTIVITAMIN MEN) TABS Take 1 tablet by mouth daily      nitroFURantoin macrocrystal-monohydrate (MACROBID) 100 MG capsule Take 1 capsule (100 mg) by " "mouth 2 times daily for 5 days. 10 capsule 0    oxyCODONE (ROXICODONE) 5 MG tablet Take 1 tablet (5 mg) by mouth every 4 hours as needed for moderate pain. 30 tablet 0    polyethylene glycol (MIRALAX) 17 GM/Dose powder Take 1 capful. by mouth daily as needed.      pravastatin (PRAVACHOL) 40 MG tablet Take 40 mg by mouth daily.      probiotic CAPS Take 1 capsule by mouth daily (Zenwise- pre-and pro-biotic)      senna-docusate (SENOKOT-S/PERICOLACE) 8.6-50 MG tablet Take 2 tablets by mouth 2 times daily as needed for constipation. 60 tablet 0        PHYSICAL EXAM:  General: Patient is alert male, no distress.   Vitals: /75   Pulse 100   Temp 97.5  F (36.4  C)   Resp 18   Ht 1.854 m (6' 1\")   Wt 119.7 kg (264 lb)   SpO2 94%   BMI 34.83 kg/m    HEENT: Head is NCAT. Eyes show no injection or icterus. Nares negative. Oropharynx well hydrated.  Neck: No JVD.  Lungs: Non labored respirations.   : Allison.  Extremities: No edema.  Musculoskeletal: Degen changes.   Skin: No rashes.   Psych: Mood appears good.      LABS/DIAGNOSTIC DATA:  Component      Latest Ref Rn 9/14/2024  10:36 AM 9/15/2024  5:47 AM 9/16/2024  6:08 AM 9/20/2024  6:09 AM   Hemoglobin      13.3 - 17.7 g/dL 14.6  13.3  11.1 (L)  11.4 (L)    WBC      4.0 - 11.0 10e3/uL 14.7 (H)  13.9 (H)   7.6    RBC Count      4.40 - 5.90 10e6/uL 4.88  4.41   3.81 (L)    Hematocrit      40.0 - 53.0 % 44.4  40.4   36.3 (L)    MCV      78 - 100 fL 91  92   95    MCH      26.5 - 33.0 pg 29.9  30.2   29.9    MCHC      31.5 - 36.5 g/dL 32.9  32.9   31.4 (L)    RDW      10.0 - 15.0 % 13.3  13.3   13.4    Platelet Count      150 - 450 10e3/uL 300  287   338       Component      Latest Ref Rng 9/14/2024  10:36 AM 9/15/2024  5:47 AM 9/16/2024  6:08 AM 9/20/2024  6:09 AM   Sodium      135 - 145 mmol/L 142  136   140    Anion Gap      7 - 15 mmol/L 9  12   7    Calcium      8.8 - 10.4 mg/dL 8.7 (L)  8.6 (L)   8.6 (L)    Creatinine      0.67 - 1.17 mg/dL 1.17  1.39 (H)  " "1.33 (H)  1.13    GFR Estimate      >60 mL/min/1.73m2 65  53 (L)  56 (L)  68    Potassium      3.4 - 5.3 mmol/L 4.2  4.6   4.6    Carbon Dioxide (CO2)      22 - 29 mmol/L 27  23   29    Urea Nitrogen      8.0 - 23.0 mg/dL 22.6  27.6 (H)   20.9    Chloride      98 - 107 mmol/L 106  101   104         ASSESSMENT/PLAN:  Right hip fracture. S/p 9/14/2024 Right hip trochanteric nailing with cephalomedullary device. Saw ortho on 10/2/2024, \"flat foot WB with walker x 4 weeks\".  Left hip pain. Known left hip DJD.  ABLA. Mild, labs as noted.  SARIAH. On CKD. Recheck BMP in TCU.  DM. Insulin lispro with meals. Has insulin pump. Follow accuchecks.  Urinary retention. Failed TOV, moore replaced 9/26/2024, follow up with urology, appt on 10/15/2024.         Electronically signed by: Estrellita Abel MD     "

## 2024-10-17 ENCOUNTER — TRANSITIONAL CARE UNIT VISIT (OUTPATIENT)
Dept: GERIATRICS | Facility: CLINIC | Age: 74
End: 2024-10-17
Payer: COMMERCIAL

## 2024-10-17 DIAGNOSIS — R33.9 URINARY RETENTION: ICD-10-CM

## 2024-10-17 DIAGNOSIS — M25.552 HIP PAIN, LEFT: ICD-10-CM

## 2024-10-17 DIAGNOSIS — E10.69 TYPE 1 DIABETES MELLITUS WITH OTHER SPECIFIED COMPLICATION (H): ICD-10-CM

## 2024-10-17 DIAGNOSIS — D62 ABLA (ACUTE BLOOD LOSS ANEMIA): ICD-10-CM

## 2024-10-17 DIAGNOSIS — S72.001A HIP FRACTURE, RIGHT, CLOSED, INITIAL ENCOUNTER (H): Primary | ICD-10-CM

## 2024-10-17 PROCEDURE — 99309 SBSQ NF CARE MODERATE MDM 30: CPT | Performed by: FAMILY MEDICINE

## 2024-10-17 NOTE — LETTER
10/17/2024      Cam Hernandez  [address] [facility] [address]        [facility] GERIATRICS  [place] Medical Record Number:  [id number]  Place of Service where encounter took place: [facility] (TCU) [[id number]]  CODE STATUS:   DNR / DNI      Chief Complaint/Reason for Visit:  Chief Complaint   Patient presents with     RECHECK     TCU 10/17/2024.       TCU HPI:    Cam Hernandez is a 74 year old male with hx of IDDM presented to the hospital on 9/14/2024 after a fall in which he sustained a right hip fracture as noted below.    [facility]  Hospitalist Discharge Summary    Date of Admission:  9/14/2024  Date of Discharge:  9/17/2024     Hospital Course  74-year-old diabetic on insulin pump with diabetic neuropathy who presented with right hip pain and was found to have right intertrochanteric hip fracture and underwent ORIF on 9/14.  Patient denies any postoperative pain.     Type 1 diabetes on insulin pump  -Blood sugars reasonably well-controlled      ARF  -- Creatinine increased to 1.3  -Creatinine improved with normal saline bolus  --        Creatinine   Date Value Ref Range Status   09/16/2024 1.33 (H) 0.67 - 1.17 mg/dL Final   -- Baseline creatinine of 1.2     Leukocytosis likely reactive  -- Trending down  -- No evidence of DVT        WBC Count   Date Value Ref Range Status   09/15/2024 13.9 (H) 4.0 - 11.0 10e3/uL Final   -- Echo is unremarkable     Left hip pain noted on 9/16  -- x-ray of the left hip which showed degenerative changes  -- No fracture or dislocation  -- Continue physical therapy.     Acute urinary retention  -- Failed weaning trial.  Allison's inserted on 9/15  -- Trial of voiding in 1 week     Other medical conditions include  Hyperlipidemia, obesity, diabetic neuropathy,?Sclerosis     Overall stabilized and discharged to TCU on 9/17/2024 for PT, OT, nursing cares, medical management and monitoring.     Today:  He saw orthopedics on 10/2/2024, NNO,  continue flat foot restricted WB, RTC 4 weeks, appt end of October. Pain in right hip from surgery and left hip from DJD. Manageable. Has moore catheter, saw urology on 10/15/2024, started Flomax but no TOV done, will return in about a week for TOV. He denies abdominal pain. No SOB, chest pain. Appetite is good. No complaints of constipation.       PAST MEDICAL HISTORY:  Past Medical History:   Diagnosis Date     Chronic kidney disease      Chronic osteoarthritis      Type 1 diabetes (H)        MEDICATIONS:  Current Outpatient Medications   Medication Sig Dispense Refill     acetaminophen (TYLENOL) 325 MG tablet Take 3 tablets (975 mg) by mouth every 8 hours. 100 tablet 0     aspirin 81 MG EC tablet Take 1 tablet (81 mg) by mouth 2 times daily. 60 tablet 0     Barberry-Oreg Grape-Goldenseal (BERBERINE COMPLEX PO) Take 1 capsule by mouth daily       blood glucose (NO BRAND SPECIFIED) test strip Use to test blood sugar 6-7 times daily or as directed.       Cholecalciferol (VITAMIN D-3) 125 MCG (5000 UT) TABS Take 5,000 Units by mouth daily        Continuous Glucose  (DEXCOM G7 ) KASSANDRA USE AS DIRECTED PER  INSTRUCTIONS       Continuous Glucose Sensor (DEXCOM G7 SENSOR) MISC USE AS DIRECTED PER  INSTRUCTIONS. REPLACE SENSOR EVERY 10 DAYS       diclofenac (VOLTAREN) 1 % topical gel Apply 4 g topically 4 times daily. Apply to the LEFT hip/groin area (not right) 50 g 0     HERBALS Take 1 each by mouth daily (Mushroom complex 6)       insulin lispro (HUMALOG VIAL) 100 UNIT/ML vial Inject subcutaneously 3 times daily (before meals). INJECT 60 UNITS UNDER THE SKIN AS DIRECTED. BOLUS UP TO 30 UNITS DAILY.  UNITS DAILY. USE WITH INSULIN PUMP       INSULIN PUMP - OUTPATIENT [facility]  [facility]       Multiple Vitamins-Minerals (MULTIVITAMIN MEN) TABS Take 1 tablet by mouth daily       oxyCODONE (ROXICODONE) 5 MG tablet Take 1 tablet (5 mg) by mouth every 4 hours as needed for  "moderate pain. 30 tablet 0     polyethylene glycol (MIRALAX) 17 GM/Dose powder Take 1 capful. by mouth daily as needed.       pravastatin (PRAVACHOL) 40 MG tablet Take 40 mg by mouth daily.       probiotic CAPS Take 1 capsule by mouth daily (Zenwise- pre-and pro-biotic)       senna-docusate (SENOKOT-S/PERICOLACE) 8.6-50 MG tablet Take 2 tablets by mouth 2 times daily as needed for constipation. 60 tablet 0     tamsulosin (FLOMAX) 0.4 MG capsule Take 1 capsule (0.4 mg) by mouth daily.          PHYSICAL EXAM:  General: Patient is alert male, no distress.   Vitals: /59   Pulse 105   Temp 96.9  F (36.1  C)   Resp 18   Ht 1.854 m (6' 1\")   Wt 113.4 kg (250 lb)   SpO2 96%   BMI 32.98 kg/m    HEENT: Head is NCAT. Eyes show no injection or icterus. Nares negative. Oropharynx well hydrated.  Neck: No JVD.  Lungs: Non labored respirations.   : Allison.  Extremities: No edema.  Musculoskeletal: Degen changes.   Skin: Warm and dry.   Psych: Mood appears good.      LABS/DIAGNOSTIC DATA:  Component      Latest Ref [facility] 9/14/2024  10:36 AM 9/15/2024  5:47 AM 9/16/2024  6:08 AM 9/20/2024  6:09 AM   Hemoglobin      13.3 - 17.7 g/dL 14.6  13.3  11.1 (L)  11.4 (L)    WBC      4.0 - 11.0 10e3/uL 14.7 (H)  13.9 (H)   7.6    RBC Count      4.40 - 5.90 10e6/uL 4.88  4.41   3.81 (L)    Hematocrit      40.0 - 53.0 % 44.4  40.4   36.3 (L)    MCV      78 - 100 fL 91  92   95    MCH      26.5 - 33.0 pg 29.9  30.2   29.9    MCHC      31.5 - 36.5 g/dL 32.9  32.9   31.4 (L)    RDW      10.0 - 15.0 % 13.3  13.3   13.4    Platelet Count      150 - 450 10e3/uL 300  287   338       Component      Latest Ref [facility] 9/14/2024  10:36 AM 9/15/2024  5:47 AM 9/16/2024  6:08 AM 9/20/2024  6:09 AM   Sodium      135 - 145 mmol/L 142  136   140    Anion Gap      7 - 15 mmol/L 9  12   7    Calcium      8.8 - 10.4 mg/dL 8.7 (L)  8.6 (L)   8.6 (L)    Creatinine      0.67 - 1.17 mg/dL 1.17  1.39 (H)  1.33 (H)  1.13    GFR Estimate      >60 mL/min/1.73m2 " "65  53 (L)  56 (L)  68    Potassium      3.4 - 5.3 mmol/L 4.2  4.6   4.6    Carbon Dioxide (CO2)      22 - 29 mmol/L 27  23   29    Urea Nitrogen      8.0 - 23.0 mg/dL 22.6  27.6 (H)   20.9    Chloride      98 - 107 mmol/L 106  101   104         ASSESSMENT/PLAN:  Right hip fracture. S/p 9/14/2024 Right hip trochanteric nailing with cephalomedullary device. Saw ortho on 10/2/2024, \"flat foot WB with walker x 4 weeks\". Follow up appt in office at end of October.   Left hip pain. Known left hip DJD. Working with therapy as able.   ABLA. Labs as above, no need for intervention.   CKD.   DM. Insulin lispro with meals. Has insulin pump. Follow accuchecks.  Urinary retention. Failed TOV, moore replaced 9/26/2024. Saw urology on 10/15/2024, started Flomax but no TOV done, will return in about a week for TOV.      Electronically signed by: Estrellita Abel MD       Sincerely,        Estrellita Abel MD      "

## 2024-10-18 VITALS
DIASTOLIC BLOOD PRESSURE: 59 MMHG | OXYGEN SATURATION: 96 % | SYSTOLIC BLOOD PRESSURE: 135 MMHG | HEART RATE: 105 BPM | TEMPERATURE: 96.9 F | HEIGHT: 73 IN | WEIGHT: 250 LBS | BODY MASS INDEX: 33.13 KG/M2 | RESPIRATION RATE: 18 BRPM

## 2024-10-24 ENCOUNTER — TRANSITIONAL CARE UNIT VISIT (OUTPATIENT)
Dept: GERIATRICS | Facility: CLINIC | Age: 74
End: 2024-10-24
Payer: COMMERCIAL

## 2024-10-24 VITALS
TEMPERATURE: 97.7 F | HEART RATE: 84 BPM | BODY MASS INDEX: 33.13 KG/M2 | OXYGEN SATURATION: 94 % | RESPIRATION RATE: 18 BRPM | HEIGHT: 73 IN | SYSTOLIC BLOOD PRESSURE: 156 MMHG | WEIGHT: 250 LBS | DIASTOLIC BLOOD PRESSURE: 74 MMHG

## 2024-10-24 DIAGNOSIS — M16.51 POST-TRAUMATIC OSTEOARTHRITIS OF RIGHT HIP: ICD-10-CM

## 2024-10-24 DIAGNOSIS — E10.69 TYPE 1 DIABETES MELLITUS WITH OTHER SPECIFIED COMPLICATION (H): ICD-10-CM

## 2024-10-24 DIAGNOSIS — M25.552 HIP PAIN, LEFT: ICD-10-CM

## 2024-10-24 DIAGNOSIS — R53.81 PHYSICAL DECONDITIONING: ICD-10-CM

## 2024-10-24 DIAGNOSIS — R33.9 URINARY RETENTION: Primary | ICD-10-CM

## 2024-10-24 DIAGNOSIS — S72.001A HIP FRACTURE, RIGHT, CLOSED, INITIAL ENCOUNTER (H): ICD-10-CM

## 2024-10-24 PROCEDURE — 99309 SBSQ NF CARE MODERATE MDM 30: CPT

## 2024-10-24 NOTE — PROGRESS NOTES
[facility] GERIATRICS    PRIMARY CARE PROVIDER AND CLINIC:  Soto Gaston MD, [address] [facility] [address]  Chief Complaint   Patient presents with    RECHECK      [place] Medical Record Number:  [id number]  Place of Service where encounter took place:  [facility] ([facility]) [[id number]]    HPI: PMH  of type 1 diabetes, neuropathy, HLD, NBA with CPAP use, presented to emergency room with mechanical fall that led to right intertrochanteric  hip fracture post fall.  Orthopedic procedure completed on 9/14/2024 right hip trochanteric nailing with cephalomedullary device.[facility] transferred to [facility] for rehab and  medical management.    History obtained from: facility chart records, facility staff, patient report, [facility] chart review, and Care Everywhere [facility] chart review       Today,  is seen in his room lying on the bed.  He reports he is getting ready for urology appointment tomorrow.  He was a started on Flomax during the last appointment.  He still has Allison catheter in place will likely be removed tomorrow after the appointment.  She is he is encouraged to drink more fluid given his recent UTI diagnosis. He is using EZ stand for transfer at this time. He lives alone he will need to transfer with walker to bathroom and chair prior to discharge. He took 10 steps during therapy today .He denies any other acute issues today he manages his insulin pump independently.  Staff reports he is doing well.    CODE STATUS/ADVANCE DIRECTIVES DISCUSSION:  No CPR- Do NOT Intubate  CPR/Full code   ALLERGIES:   Allergies   Allergen Reactions    Ammonium Lac Itching     Legs very red, excessive itching    Amoxicillin Nausea     Other reaction(s): nausea    Erythromycin Nausea    Naprosyn [Naproxen] Unknown     9/14/24 taking aspirin 81 mg pta/given inpt at [facility]      PAST MEDICAL HISTORY:   Past Medical History:   Diagnosis Date    Chronic kidney disease     Chronic osteoarthritis     Type 1 diabetes (H)        PAST SURGICAL HISTORY:   has a past surgical history that includes Open reduction internal fixation hip nailing (Right, 9/14/2024).  FAMILY HISTORY: family history is not on file.  SOCIAL HISTORY:   reports that he quit smoking about 11 years ago. His smoking use included cigars. He has never used smokeless tobacco.      Post Discharge Medication Reconciliation Status: Updated         Current Outpatient Medications   Medication Sig Dispense Refill    tamsulosin (FLOMAX) 0.4 MG capsule Take 1 capsule (0.4 mg) by mouth daily.      acetaminophen (TYLENOL) 325 MG tablet Take 3 tablets (975 mg) by mouth every 8 hours. 100 tablet 0    aspirin 81 MG EC tablet Take 1 tablet (81 mg) by mouth 2 times daily. 60 tablet 0    Barberry-Oreg Grape-Goldenseal (BERBERINE COMPLEX PO) Take 1 capsule by mouth daily      blood glucose (NO BRAND SPECIFIED) test strip Use to test blood sugar 6-7 times daily or as directed.      Cholecalciferol (VITAMIN D-3) 125 MCG (5000 UT) TABS Take 5,000 Units by mouth daily       Continuous Glucose  (DEXCOM G7 ) KASSANDRA USE AS DIRECTED PER  INSTRUCTIONS      Continuous Glucose Sensor (DEXCOM G7 SENSOR) MISC USE AS DIRECTED PER  INSTRUCTIONS. REPLACE SENSOR EVERY 10 DAYS      diclofenac (VOLTAREN) 1 % topical gel Apply 4 g topically 4 times daily. Apply to the LEFT hip/groin area (not right) 50 g 0    HERBALS Take 1 each by mouth daily (Mushroom complex 6)      insulin lispro (HUMALOG VIAL) 100 UNIT/ML vial Inject subcutaneously 3 times daily (before meals). INJECT 60 UNITS UNDER THE SKIN AS DIRECTED. BOLUS UP TO 30 UNITS DAILY.  UNITS DAILY. USE WITH INSULIN PUMP      INS[facility] - [facility]PAT[facility]      Multiple Vitamins-Minerals (MULTIVITAMIN MEN) TABS Take 1 tablet by mouth daily      oxyCODONE (ROXICODONE) 5 MG tablet Take 1 tablet (5 mg) by mouth every 4 hours as needed for moderate pain. 30 tablet 0    polyethylene glycol  "(MIRALAX) 17 GM/Dose powder Take 1 capful. by mouth daily as needed.      pravastatin (PRAVACHOL) 40 MG tablet Take 40 mg by mouth daily.      probiotic CAPS Take 1 capsule by mouth daily (Zenwise- pre-and pro-biotic)      senna-docusate (SENOKOT-S/PERICOLACE) 8.6-50 MG tablet Take 2 tablets by mouth 2 times daily as needed for constipation. 60 tablet 0     No current facility-administered medications for this visit.       ROS:  4 point ROS including Respiratory, CV, GI and , other than that noted in the HPI,  is negative    Vitals:  BP (!) 156/74   Pulse 84   Temp 97.7  F (36.5  C)   Resp 18   Ht 1.854 m (6' 1\")   Wt 113.4 kg (250 lb)   SpO2 94%   BMI 32.98 kg/m    Exam:  GENERAL APPEARANCE:  Alert, in no distress, morbidly obese  NECK:  No adenopathy,masses or thyromegaly  RESP:  respiratory effort and palpation of chest normal, lungs clear to auscultation , no respiratory distress  CV:  regular rate and rhythm, no murmur, rub, or gallop, trace edema BLE  ABDOMEN:  normal bowel sounds, soft, nontender, no hepatosplenomegaly or other masses, reports constipation, no guarding or rebound, bowel sounds normal  :    Moore removed trial of void unsuccessful , moore replaced 9/26 urology for follow on 10/15/24 UTI on abx for 5 days back to urology tomorrow.  M/S:   Using EZ Stand transfer  SKIN:  wound healing well, no signs of infection site is covered no sign of infection  NEURO:   Examination of sensation by touch normal  PSYCH:  oriented X 3    Lab/Diagnostic data:  Most Recent 3 CBC's:  Recent Labs   Lab Test 10/11/24  0523 09/20/24  0609 09/16/24  0608 09/15/24  0547   WBC 7.0 7.6  --  13.9*   HGB 11.3* 11.4* 11.1* 13.3   MCV 95 95  --  92    338  --  287     Most Recent 3 BMP's:  Recent Labs   Lab Test 09/20/24  0609 09/17/24  1210 09/17/24  0750 09/16/24  0748 09/16/24  0608 09/15/24  0710 09/15/24  0547 09/14/24 2033 09/14/24  1036     --   --   --   --   --  136  --  142   POTASSIUM 4.6 "  --   --   --   --   --  4.6  --  4.2   CHLORIDE 104  --   --   --   --   --  101  --  106   CO2 29  --   --   --   --   --  23  --  27   BUN 20.9  --   --   --   --   --  27.6*  --  22.6   CR 1.13  --   --   --  1.33*  --  1.39*  --  1.17   ANIONGAP 7  --   --   --   --   --  12  --  9   ETHEL 8.6*  --   --   --   --   --  8.6*  --  8.7*   * 118* 139*   < >  --    < > 200*   < > 142*    < > = values in this interval not displayed.       ASSESSMENT/PLAN:  (R33.9) Urinary retention  Comment: Recent UTI resolved urine looks great today started on Flomax in the last urology appointment follow-up scheduled for tomorrow.  Plan:   - Urology follow-up tomorrow  - Continue Flomax Daily  - Monitor for changes.    (S72.001A) Hip fracture, right, closed,  (primary encounter diagnosis)  (M25.551) Right hip pain  (M16.51) Post-traumatic osteoarthritis of   (R53.81) Physical deconditioning  Comment: Acute on chronic right intertrochanteric hip fracture and underwent ORIF on 9/14/24 incision site  open to air and intact. Activity as tolerated since 10/02/2024 for 4 weeks . Slow progression with therapy working on transferring and weightbearing as tolerated.  Plan:   - Pain managed with  APAP/Oxycodone   -Follow-up with [place] [facility] in 4 weeks  -PT/OT ongoing  - ASA for anticoagulation BID open-ended      (E10.69) Type 1 diabetes mellitus with other specified complication (H)  Comment: No episodes of of hyperglycemia reported BS controlled  Last A1C 6.1.BS ranging 150-180.  Plan:   - Continue Continues insuline use via self managed pump  -Monitor for changes          Electronically signed by: RUTH Nunez CNP

## 2024-10-24 NOTE — LETTER
10/24/2024      Cam Hernandez  [address] [facility] [address]        [facility] GERIATRICS    PRIMARY CARE PROVIDER AND CLINIC:  Soto Gaston MD, [address] [facility] [address]  Chief Complaint   Patient presents with     RECHECK      [place] Medical Record Number:  [id number]  Place of Service where encounter took place:  [facility] ([facility]) [[id number]]    HPI: PMH  of type 1 diabetes, neuropathy, HLD, NBA with CPAP use, presented to emergency room with mechanical fall that led to right intertrochanteric  hip fracture post fall.  Orthopedic procedure completed on 9/14/2024 right hip trochanteric nailing with cephalomedullary device.He transferred to [facility] for rehab and  medical management.    History obtained from: facility chart records, facility staff, patient report, [facility] chart review, and Care Everywhere [facility] chart review       Today,  is seen in his room lying on the bed.  He reports he is getting ready for urology appointment tomorrow.  He was a started on Flomax during the last appointment.  He still has Allison catheter in place will likely be removed tomorrow after the appointment.  She is he is encouraged to drink more fluid given his recent UTI diagnosis. He is using EZ stand for transfer at this time. He lives alone he will need to transfer with walker to bathroom and chair prior to discharge. He took 10 steps during therapy today .He denies any other acute issues today he manages his insulin pump independently.  Staff reports he is doing well.    CODE STATUS/ADVANCE DIRECTIVES DISCUSSION:  No CPR- Do NOT Intubate  CPR/Full code   ALLERGIES:   Allergies   Allergen Reactions     Ammonium Lac Itching     Legs very red, excessive itching     Amoxicillin Nausea     Other reaction(s): nausea     Erythromycin Nausea     Naprosyn [Naproxen] Unknown     9/14/24 taking aspirin 81 mg pta/given inpt at [facility]      PAST MEDICAL HISTORY:   Past Medical History:   Diagnosis  Date     Chronic kidney disease      Chronic osteoarthritis      Type 1 diabetes (H)       PAST SURGICAL HISTORY:   has a past surgical history that includes Open reduction internal fixation hip nailing (Right, 9/14/2024).  FAMILY HISTORY: family history is not on file.  SOCIAL HISTORY:   reports that he quit smoking about 11 years ago. His smoking use included cigars. He has never used smokeless tobacco.      Post Discharge Medication Reconciliation Status: Updated         Current Outpatient Medications   Medication Sig Dispense Refill     tamsulosin (FLOMAX) 0.4 MG capsule Take 1 capsule (0.4 mg) by mouth daily.       acetaminophen (TYLENOL) 325 MG tablet Take 3 tablets (975 mg) by mouth every 8 hours. 100 tablet 0     aspirin 81 MG EC tablet Take 1 tablet (81 mg) by mouth 2 times daily. 60 tablet 0     Barberry-Oreg Grape-Goldenseal (BERBERINE COMPLEX PO) Take 1 capsule by mouth daily       blood glucose (NO BRAND SPECIFIED) test strip Use to test blood sugar 6-7 times daily or as directed.       Cholecalciferol (VITAMIN D-3) 125 MCG (5000 UT) TABS Take 5,000 Units by mouth daily        Continuous Glucose  (DEXCOM G7 ) KASSANDRA USE AS DIRECTED PER  INSTRUCTIONS       Continuous Glucose Sensor (DEXCOM G7 SENSOR) MISC USE AS DIRECTED PER  INSTRUCTIONS. REPLACE SENSOR EVERY 10 DAYS       diclofenac (VOLTAREN) 1 % topical gel Apply 4 g topically 4 times daily. Apply to the LEFT hip/groin area (not right) 50 g 0     HERBALS Take 1 each by mouth daily (Mushroom complex 6)       insulin lispro (HUMALOG VIAL) 100 UNIT/ML vial Inject subcutaneously 3 times daily (before meals). INJECT 60 UNITS UNDER THE SKIN AS DIRECTED. BOLUS UP TO 30 UNITS DAILY.  UNITS DAILY. USE WITH INSULIN PUMP       INSULIN PUMP - OUTPATIENT [facility]  [facility]       Multiple Vitamins-Minerals (MULTIVITAMIN MEN) TABS Take 1 tablet by mouth daily       oxyCODONE (ROXICODONE) 5 MG tablet Take 1  "tablet (5 mg) by mouth every 4 hours as needed for moderate pain. 30 tablet 0     polyethylene glycol (MIRALAX) 17 GM/Dose powder Take 1 capful. by mouth daily as needed.       pravastatin (PRAVACHOL) 40 MG tablet Take 40 mg by mouth daily.       probiotic CAPS Take 1 capsule by mouth daily (Zenwise- pre-and pro-biotic)       senna-docusate (SENOKOT-S/PERICOLACE) 8.6-50 MG tablet Take 2 tablets by mouth 2 times daily as needed for constipation. 60 tablet 0     No current facility-administered medications for this visit.       ROS:  4 point ROS including Respiratory, CV, GI and , other than that noted in the HPI,  is negative    Vitals:  BP (!) 156/74   Pulse 84   Temp 97.7  F (36.5  C)   Resp 18   Ht 1.854 m (6' 1\")   Wt 113.4 kg (250 lb)   SpO2 94%   BMI 32.98 kg/m    Exam:  GENERAL APPEARANCE:  Alert, in no distress, morbidly obese  NECK:  No adenopathy,masses or thyromegaly  RESP:  respiratory effort and palpation of chest normal, lungs clear to auscultation , no respiratory distress  CV:  regular rate and rhythm, no murmur, rub, or gallop, trace edema BLE  ABDOMEN:  normal bowel sounds, soft, nontender, no hepatosplenomegaly or other masses, reports constipation, no guarding or rebound, bowel sounds normal  :    Moore removed trial of void unsuccessful , moore replaced 9/26 urology for follow on 10/15/24 UTI on abx for 5 days back to urology tomorrow.  M/S:   Using EZ Stand transfer  SKIN:  wound healing well, no signs of infection site is covered no sign of infection  NEURO:   Examination of sensation by touch normal  PSYCH:  oriented X 3    Lab/Diagnostic data:  Most Recent 3 CBC's:  Recent Labs   Lab Test 10/11/24  0523 09/20/24  0609 09/16/24  0608 09/15/24  0547   WBC 7.0 7.6  --  13.9*   HGB 11.3* 11.4* 11.1* 13.3   MCV 95 95  --  92    338  --  287     Most Recent 3 BMP's:  Recent Labs   Lab Test 09/20/24  0609 09/17/24  1210 09/17/24  0750 09/16/24  0748 09/16/24  0608 09/15/24  0710 " 09/15/24  0547 09/14/24 2033 09/14/24  1036     --   --   --   --   --  136  --  142   POTASSIUM 4.6  --   --   --   --   --  4.6  --  4.2   CHLORIDE 104  --   --   --   --   --  101  --  106   CO2 29  --   --   --   --   --  23  --  27   BUN 20.9  --   --   --   --   --  27.6*  --  22.6   CR 1.13  --   --   --  1.33*  --  1.39*  --  1.17   ANIONGAP 7  --   --   --   --   --  12  --  9   ETHEL 8.6*  --   --   --   --   --  8.6*  --  8.7*   * 118* 139*   < >  --    < > 200*   < > 142*    < > = values in this interval not displayed.       ASSESSMENT/PLAN:  (R33.9) Urinary retention  Comment: Recent UTI resolved urine looks great today started on Flomax in the last urology appointment follow-up scheduled for tomorrow.  Plan:   - Urology follow-up tomorrow  - Continue Flomax Daily  - Monitor for changes.    (S72.001A) Hip fracture, right, closed,  (primary encounter diagnosis)  (M25.551) Right hip pain  (M16.51) Post-traumatic osteoarthritis of   (R53.81) Physical deconditioning  Comment: Acute on chronic right intertrochanteric hip fracture and underwent ORIF on 9/14/24 incision site  open to air and intact. Activity as tolerated since 10/02/2024 for 4 weeks . Slow progression with therapy working on transferring and weightbearing as tolerated.  Plan:   - Pain managed with  APAP/Oxycodone   -Follow-up with [place] [facility] in 4 weeks  -PT/OT ongoing  - ASA for anticoagulation BID open-ended      (E10.69) Type 1 diabetes mellitus with other specified complication (H)  Comment: No episodes of of hyperglycemia reported BS controlled  Last A1C 6.1.BS ranging 150-180.  Plan:   - Continue Continues insuline use via self managed pump  -Monitor for changes          Electronically signed by: RUTH Nunez CNP           Sincerely,        RUTH Nunez CNP

## 2024-10-27 PROBLEM — K57.30 DIVERTICULAR DISEASE OF LARGE INTESTINE: Status: ACTIVE | Noted: 2023-10-19

## 2024-10-27 PROBLEM — E11.319 DIABETIC RETINOPATHY (H): Status: ACTIVE | Noted: 2024-10-27

## 2024-10-27 PROBLEM — Z99.89 USE OF CANE AS AMBULATORY AID: Status: ACTIVE | Noted: 2024-10-27

## 2024-10-27 PROBLEM — G47.33 OSA (OBSTRUCTIVE SLEEP APNEA): Status: ACTIVE | Noted: 2024-10-27

## 2024-10-27 PROBLEM — Z86.0100 HISTORY OF COLONIC POLYPS: Status: ACTIVE | Noted: 2018-07-23

## 2024-10-27 PROBLEM — D12.2 BENIGN NEOPLASM OF ASCENDING COLON: Status: ACTIVE | Noted: 2023-10-23

## 2024-10-27 PROBLEM — K63.5 POLYP OF COLON: Status: ACTIVE | Noted: 2018-07-19

## 2024-10-27 PROBLEM — E11.40 DIABETIC NEUROPATHY (H): Status: ACTIVE | Noted: 2024-10-27

## 2024-10-27 PROBLEM — E78.5 HYPERLIPIDEMIA: Status: ACTIVE | Noted: 2024-10-27

## 2024-10-27 RX ORDER — TAMSULOSIN HYDROCHLORIDE 0.4 MG/1
0.4 CAPSULE ORAL DAILY
COMMUNITY
Start: 2024-10-27

## 2024-10-28 NOTE — PROGRESS NOTES
[facility] GERIATRICS  [place] Medical Record Number:  [id number]  Place of Service where encounter took place: [facility] ([facility]) [[id number]]  CODE STATUS:   DNR / DNI      Chief Complaint/Reason for Visit:  Chief Complaint   Patient presents with    RECHECK     TCU 10/17/2024.       TCU HPI:    Cam Hernandez is a 74 year old male with hx of IDDM presented to the hospital on 9/14/2024 after a fall in which he sustained a right hip fracture as noted below.    [facility]  Hospitalist Discharge Summary    Date of Admission:  9/14/2024  Date of Discharge:  9/17/2024     Hospital Course  74-year-old diabetic on insulin pump with diabetic neuropathy who presented with right hip pain and was found to have right intertrochanteric hip fracture and underwent ORIF on 9/14.  Patient denies any postoperative pain.     Type 1 diabetes on insulin pump  -Blood sugars reasonably well-controlled      ARF  -- Creatinine increased to 1.3  -Creatinine improved with normal saline bolus  --        Creatinine   Date Value Ref Range Status   09/16/2024 1.33 (H) 0.67 - 1.17 mg/dL Final   -- Baseline creatinine of 1.2     Leukocytosis likely reactive  -- Trending down  -- No evidence of DVT        WBC Count   Date Value Ref Range Status   09/15/2024 13.9 (H) 4.0 - 11.0 10e3/uL Final   -- Echo is unremarkable     Left hip pain noted on 9/16  -- x-ray of the left hip which showed degenerative changes  -- No fracture or dislocation  -- Continue physical therapy.     Acute urinary retention  -- Failed weaning trial.  Allison's inserted on 9/15  -- Trial of voiding in 1 week     Other medical conditions include  Hyperlipidemia, obesity, diabetic neuropathy,?Sclerosis     Overall stabilized and discharged to TCU on 9/17/2024 for PT, OT, nursing cares, medical management and monitoring.     Today:  He saw orthopedics on 10/2/2024, NNO, continue flat foot restricted WB, RTC 4 weeks, appt end of October. Pain in right  hip from surgery and left hip from DJD. Manageable. Has moore catheter, saw urology on 10/15/2024, started Flomax but no TOV done, will return in about a week for TOV. He denies abdominal pain. No SOB, chest pain. Appetite is good. No complaints of constipation.       PAST MEDICAL HISTORY:  Past Medical History:   Diagnosis Date    Chronic kidney disease     Chronic osteoarthritis     Type 1 diabetes (H)        MEDICATIONS:  Current Outpatient Medications   Medication Sig Dispense Refill    acetaminophen (TYLENOL) 325 MG tablet Take 3 tablets (975 mg) by mouth every 8 hours. 100 tablet 0    aspirin 81 MG EC tablet Take 1 tablet (81 mg) by mouth 2 times daily. 60 tablet 0    Barberry-Oreg Grape-Goldenseal (BERBERINE COMPLEX PO) Take 1 capsule by mouth daily      blood glucose (NO BRAND SPECIFIED) test strip Use to test blood sugar 6-7 times daily or as directed.      Cholecalciferol (VITAMIN D-3) 125 MCG (5000 UT) TABS Take 5,000 Units by mouth daily       Continuous Glucose  (DEXCOM G7 ) KASSANDRA USE AS DIRECTED PER  INSTRUCTIONS      Continuous Glucose Sensor (DEXCOM G7 SENSOR) MISC USE AS DIRECTED PER  INSTRUCTIONS. REPLACE SENSOR EVERY 10 DAYS      diclofenac (VOLTAREN) 1 % topical gel Apply 4 g topically 4 times daily. Apply to the LEFT hip/groin area (not right) 50 g 0    HERBALS Take 1 each by mouth daily (Mushroom complex 6)      insulin lispro (HUMALOG VIAL) 100 UNIT/ML vial Inject subcutaneously 3 times daily (before meals). INJECT 60 UNITS UNDER THE SKIN AS DIRECTED. BOLUS UP TO 30 UNITS DAILY.  UNITS DAILY. USE WITH INSULIN PUMP      INSULIN PUMP - OUTPATIENT Per Endocrinology  [facility]      Multiple Vitamins-Minerals (MULTIVITAMIN MEN) TABS Take 1 tablet by mouth daily      oxyCODONE (ROXICODONE) 5 MG tablet Take 1 tablet (5 mg) by mouth every 4 hours as needed for moderate pain. 30 tablet 0    polyethylene glycol (MIRALAX) 17 GM/Dose powder Take 1 capful. by  "mouth daily as needed.      pravastatin (PRAVACHOL) 40 MG tablet Take 40 mg by mouth daily.      probiotic CAPS Take 1 capsule by mouth daily (Zenwise- pre-and pro-biotic)      senna-docusate (SENOKOT-S/PERICOLACE) 8.6-50 MG tablet Take 2 tablets by mouth 2 times daily as needed for constipation. 60 tablet 0    tamsulosin (FLOMAX) 0.4 MG capsule Take 1 capsule (0.4 mg) by mouth daily.          PHYSICAL EXAM:  General: Patient is alert male, no distress.   Vitals: /59   Pulse 105   Temp 96.9  F (36.1  C)   Resp 18   Ht 1.854 m (6' 1\")   Wt 113.4 kg (250 lb)   SpO2 96%   BMI 32.98 kg/m    HEENT: Head is NCAT. Eyes show no injection or icterus. Nares negative. Oropharynx well hydrated.  Neck: No JVD.  Lungs: Non labored respirations.   : Allison.  Extremities: No edema.  Musculoskeletal: Degen changes.   Skin: Warm and dry.   Psych: Mood appears good.      LABS/DIAGNOSTIC DATA:  Component      Latest Ref [facility] 9/14/2024  10:36 AM 9/15/2024  5:47 AM 9/16/2024  6:08 AM 9/20/2024  6:09 AM   Hemoglobin      13.3 - 17.7 g/dL 14.6  13.3  11.1 (L)  11.4 (L)    WBC      4.0 - 11.0 10e3/uL 14.7 (H)  13.9 (H)   7.6    RBC Count      4.40 - 5.90 10e6/uL 4.88  4.41   3.81 (L)    Hematocrit      40.0 - 53.0 % 44.4  40.4   36.3 (L)    MCV      78 - 100 fL 91  92   95    MCH      26.5 - 33.0 pg 29.9  30.2   29.9    MCHC      31.5 - 36.5 g/dL 32.9  32.9   31.4 (L)    RDW      10.0 - 15.0 % 13.3  13.3   13.4    Platelet Count      150 - 450 10e3/uL 300  287   338       Component      Latest Ref [facility] 9/14/2024  10:36 AM 9/15/2024  5:47 AM 9/16/2024  6:08 AM 9/20/2024  6:09 AM   Sodium      135 - 145 mmol/L 142  136   140    Anion Gap      7 - 15 mmol/L 9  12   7    Calcium      8.8 - 10.4 mg/dL 8.7 (L)  8.6 (L)   8.6 (L)    Creatinine      0.67 - 1.17 mg/dL 1.17  1.39 (H)  1.33 (H)  1.13    GFR Estimate      >60 mL/min/1.73m2 65  53 (L)  56 (L)  68    Potassium      3.4 - 5.3 mmol/L 4.2  4.6   4.6    Carbon Dioxide (CO2)      " "22 - 29 mmol/L 27  23   29    Urea Nitrogen      8.0 - 23.0 mg/dL 22.6  27.6 (H)   20.9    Chloride      98 - 107 mmol/L 106  101   104         ASSESSMENT/PLAN:  Right hip fracture. S/p 9/14/2024 Right hip trochanteric nailing with cephalomedullary device. Saw ortho on 10/2/2024, \"flat foot WB with walker x 4 weeks\". Follow up appt in office at end of October.   Left hip pain. Known left hip DJD. Working with therapy as able.   ABLA. Labs as above, no need for intervention.   CKD.   DM. Insulin lispro with meals. Has insulin pump. Follow accuchecks.  Urinary retention. Failed TOV, moore replaced 9/26/2024. Saw urology on 10/15/2024, started Flomax but no TOV done, will return in about a week for TOV.      Electronically signed by: Estrellita Abel MD     "

## 2024-11-01 ENCOUNTER — LAB REQUISITION (OUTPATIENT)
Dept: LAB | Facility: CLINIC | Age: 74
End: 2024-11-01
Payer: COMMERCIAL

## 2024-11-01 ENCOUNTER — TRANSITIONAL CARE UNIT VISIT (OUTPATIENT)
Dept: GERIATRICS | Facility: CLINIC | Age: 74
End: 2024-11-01
Payer: COMMERCIAL

## 2024-11-01 VITALS
RESPIRATION RATE: 16 BRPM | BODY MASS INDEX: 33.56 KG/M2 | TEMPERATURE: 97.6 F | HEART RATE: 86 BPM | SYSTOLIC BLOOD PRESSURE: 150 MMHG | WEIGHT: 253.2 LBS | DIASTOLIC BLOOD PRESSURE: 67 MMHG | HEIGHT: 73 IN | OXYGEN SATURATION: 94 %

## 2024-11-01 DIAGNOSIS — D72.829 LEUKOCYTOSIS, UNSPECIFIED TYPE: ICD-10-CM

## 2024-11-01 DIAGNOSIS — R53.81 PHYSICAL DECONDITIONING: ICD-10-CM

## 2024-11-01 DIAGNOSIS — E10.69 TYPE 1 DIABETES MELLITUS WITH OTHER SPECIFIED COMPLICATION (H): ICD-10-CM

## 2024-11-01 DIAGNOSIS — N17.9 ACUTE KIDNEY FAILURE, UNSPECIFIED (H): ICD-10-CM

## 2024-11-01 DIAGNOSIS — E11.9 TYPE 2 DIABETES MELLITUS WITHOUT COMPLICATIONS (H): ICD-10-CM

## 2024-11-01 DIAGNOSIS — S72.001D CLOSED FRACTURE OF RIGHT HIP WITH ROUTINE HEALING, SUBSEQUENT ENCOUNTER: Primary | ICD-10-CM

## 2024-11-01 PROCEDURE — 99309 SBSQ NF CARE MODERATE MDM 30: CPT

## 2024-11-01 NOTE — LETTER
" 11/1/2024      Cam Hernandez  [address] [place] [address]        [facility] GERIATRICS    Chief Complaint   Patient presents with     RECHECK     HPI:  Cam Hernandez is a 74 year old  (1950), who is being seen today for an episodic care visit at: [facility] ([facility]) [[id number]].   Today's concern is:   Therapy progression    Follow up with pain     History obtained from: facility chart records, facility staff, patient report, [facility] chart review, and Care Everywhere [facility] chart review     Today,  is seen in his room lying in bed. He reports improved progression with therapy and increased distance with ambulation. He denies any pain and only utilizing PRN APAP and agreed to discontinue oxycodone. No additional acute issues reported will obtain basic lab for electrolyte check today .      Allergies, and PMH/PSH reviewed in [facility] today.  REVIEW OF SYSTEMS:  4 point ROS including Respiratory, CV, GI and , other than that noted in the HPI,  is negative    Objective:   BP (!) 150/67   Pulse 86   Temp 97.6  F (36.4  C)   Resp 16   Ht 1.854 m (6' 1\")   Wt 114.9 kg (253 lb 3.2 oz)   SpO2 94%   BMI 33.41 kg/m    GENERAL APPEARANCE:  Alert, in no distress  RESP:  respiratory effort and palpation of chest normal, lungs clear to auscultation , no respiratory distress  CV:  regular rate and rhythm, no murmur, rub, or gallop, +2 pedal pulses, trace edema   ABDOMEN:  normal bowel sounds, soft, nontender, no hepatosplenomegaly or other masses, no guarding or rebound, bowel sounds normal  :    Allison removed by urology voiding om   M/S:   Gait and station normal  SKIN:  Inspection of skin and subcutaneous tissue baseline  NEURO:   Cranial nerves 2-12 are normal tested and grossly at patient's baseline  PSYCH:  oriented X 3    Most Recent 3 CBC's:  Recent Labs   Lab Test 11/04/24  0637 10/11/24  0523 09/20/24  0609   WBC 10.2 7.0 7.6   HGB 11.2* 11.3* 11.4*   MCV 94 95 95   * 442 " 338     Most Recent 3 BMP's:  Recent Labs   Lab Test 11/04/24  0637 09/20/24  0609 09/17/24  1210 09/16/24  0748 09/16/24  0608 09/15/24  0710 09/15/24  0547    140  --   --   --   --  136   POTASSIUM 4.1 4.6  --   --   --   --  4.6   CHLORIDE 104 104  --   --   --   --  101   CO2 27 29  --   --   --   --  23   BUN 23.1* 20.9  --   --   --   --  27.6*   CR 1.13 1.13  --   --  1.33*  --  1.39*   ANIONGAP 8 7  --   --   --   --  12   ETHEL 9.0 8.6*  --   --   --   --  8.6*   GLC 67* 158* 118*   < >  --    < > 200*    < > = values in this interval not displayed.       Assessment/Plan:  (S72.001D) Closed fracture of right hip with routine healing, subsequent encounter  (primary encounter diagnosis)  (R53.81) Physical deconditioning  Comment: Slow progression with therapy no pain incision site clean and dry intact. Only using APAP PRN discontinued oxycodone.   Plan:   -Follow up with [place] [facility] per schedule  - ASA for prophylactic anticoagulation BID  -APAAP/PRN        (E10.69) Type 1 diabetes mellitus with other specified complication (H)  Comment: No episodes of of hyperglycemia reported BS controlled  Last A1C 6.1.BS ranging 165-200  Plan:   - Continue Continues insuline use via self managed pump  -Monitor for changes  - BMP due 11/04/2024    (D72.829) Leukocytosis, unspecified type  Comment: chronic stable last WBC  improved 10.2- 7.6 during the hospital stay ranging 13-14.  Plan:  - Periodic lab to assess WBC elevation     MED REC REQUIRED{  Post Medication Reconciliation Status: discharge medications reconciled and changed, per note/orders      Orders:  -Discontinue oxycodone  - Lab due 11/04/24 BMP , CBC    Electronically signed by:RUTH Nunez CNP       Sincerely,        RUTH Nunez CNP

## 2024-11-01 NOTE — PROGRESS NOTES
"[facility] GERIATRICS    Chief Complaint   Patient presents with    RECHECK     HPI:  Cam Hernandez is a 74 year old  (1950), who is being seen today for an episodic care visit at: [facility] ([facility]) [[id number]].   Today's concern is:   Therapy progression    Follow up with pain     History obtained from: facility chart records, facility staff, patient report, [facility] chart review, and Care Everywhere [facility] chart review     Today,  is seen in his room lying in bed. He reports improved progression with therapy and increased distance with ambulation. He denies any pain and only utilizing PRN APAP and agreed to discontinue oxycodone. No additional acute issues reported will obtain basic lab for electrolyte check today .      Allergies, and PMH/PSH reviewed in [facility] today.  REVIEW OF SYSTEMS:  4 point ROS including Respiratory, CV, GI and , other than that noted in the HPI,  is negative    Objective:   BP (!) 150/67   Pulse 86   Temp 97.6  F (36.4  C)   Resp 16   Ht 1.854 m (6' 1\")   Wt 114.9 kg (253 lb 3.2 oz)   SpO2 94%   BMI 33.41 kg/m    GENERAL APPEARANCE:  Alert, in no distress  RESP:  respiratory effort and palpation of chest normal, lungs clear to auscultation , no respiratory distress  CV:  regular rate and rhythm, no murmur, rub, or gallop, +2 pedal pulses, trace edema   ABDOMEN:  normal bowel sounds, soft, nontender, no hepatosplenomegaly or other masses, no guarding or rebound, bowel sounds normal  :    Allison removed by urology voiding om   M/S:   Gait and station normal  SKIN:  Inspection of skin and subcutaneous tissue baseline  NEURO:   Cranial nerves 2-12 are normal tested and grossly at patient's baseline  PSYCH:  oriented X 3    Most Recent 3 CBC's:  Recent Labs   Lab Test 11/04/24  0637 10/11/24  0523 09/20/24  0609   WBC 10.2 7.0 7.6   HGB 11.2* 11.3* 11.4*   MCV 94 95 95   * 442 338     Most Recent 3 BMP's:  Recent Labs   Lab Test 11/04/24  0637 " 09/20/24  0609 09/17/24  1210 09/16/24  0748 09/16/24  0608 09/15/24  0710 09/15/24  0547    140  --   --   --   --  136   POTASSIUM 4.1 4.6  --   --   --   --  4.6   CHLORIDE 104 104  --   --   --   --  101   CO2 27 29  --   --   --   --  23   BUN 23.1* 20.9  --   --   --   --  27.6*   CR 1.13 1.13  --   --  1.33*  --  1.39*   ANIONGAP 8 7  --   --   --   --  12   ETHEL 9.0 8.6*  --   --   --   --  8.6*   GLC 67* 158* 118*   < >  --    < > 200*    < > = values in this interval not displayed.       Assessment/Plan:  (S72.001D) Closed fracture of right hip with routine healing, subsequent encounter  (primary encounter diagnosis)  (R53.81) Physical deconditioning  Comment: Slow progression with therapy no pain incision site clean and dry intact. Only using APAP PRN discontinued oxycodone.   Plan:   -Follow up with [place] [facility] per schedule  - ASA for prophylactic anticoagulation BID  -APAAP/PRN        (E10.69) Type 1 diabetes mellitus with other specified complication (H)  Comment: No episodes of of hyperglycemia reported BS controlled  Last A1C 6.1.BS ranging 165-200  Plan:   - Continue Continues insuline use via self managed pump  -Monitor for changes  - BMP due 11/04/2024    (D72.829) Leukocytosis, unspecified type  Comment: chronic stable last WBC  improved 10.2- 7.6 during the hospital stay ranging 13-14.  Plan:  - Periodic lab to assess WBC elevation     MED REC REQUIRED{  Post Medication Reconciliation Status: discharge medications reconciled and changed, per note/orders      Orders:  -Discontinue oxycodone  - Lab due 11/04/24 BMP , CBC    Electronically signed by:RUTH Nunez CNP

## 2024-11-04 ENCOUNTER — TRANSITIONAL CARE UNIT VISIT (OUTPATIENT)
Dept: GERIATRICS | Facility: CLINIC | Age: 74
End: 2024-11-04
Payer: COMMERCIAL

## 2024-11-04 DIAGNOSIS — S72.001D CLOSED FRACTURE OF RIGHT HIP WITH ROUTINE HEALING, SUBSEQUENT ENCOUNTER: Primary | ICD-10-CM

## 2024-11-04 DIAGNOSIS — D62 ABLA (ACUTE BLOOD LOSS ANEMIA): ICD-10-CM

## 2024-11-04 DIAGNOSIS — N28.9 RENAL INSUFFICIENCY: ICD-10-CM

## 2024-11-04 DIAGNOSIS — R33.9 URINARY RETENTION: ICD-10-CM

## 2024-11-04 DIAGNOSIS — E10.69 TYPE 1 DIABETES MELLITUS WITH OTHER SPECIFIED COMPLICATION (H): ICD-10-CM

## 2024-11-04 LAB
ANION GAP SERPL CALCULATED.3IONS-SCNC: 8 MMOL/L (ref 7–15)
BUN SERPL-MCNC: 23.1 MG/DL (ref 8–23)
CALCIUM SERPL-MCNC: 9 MG/DL (ref 8.8–10.4)
CHLORIDE SERPL-SCNC: 104 MMOL/L (ref 98–107)
CREAT SERPL-MCNC: 1.13 MG/DL (ref 0.67–1.17)
EGFRCR SERPLBLD CKD-EPI 2021: 68 ML/MIN/1.73M2
ERYTHROCYTE [DISTWIDTH] IN BLOOD BY AUTOMATED COUNT: 14.3 % (ref 10–15)
GLUCOSE SERPL-MCNC: 67 MG/DL (ref 70–99)
HCO3 SERPL-SCNC: 27 MMOL/L (ref 22–29)
HCT VFR BLD AUTO: 35.8 % (ref 40–53)
HGB BLD-MCNC: 11.2 G/DL (ref 13.3–17.7)
MCH RBC QN AUTO: 29.3 PG (ref 26.5–33)
MCHC RBC AUTO-ENTMCNC: 31.3 G/DL (ref 31.5–36.5)
MCV RBC AUTO: 94 FL (ref 78–100)
PLATELET # BLD AUTO: 454 10E3/UL (ref 150–450)
POTASSIUM SERPL-SCNC: 4.1 MMOL/L (ref 3.4–5.3)
RBC # BLD AUTO: 3.82 10E6/UL (ref 4.4–5.9)
SODIUM SERPL-SCNC: 139 MMOL/L (ref 135–145)
WBC # BLD AUTO: 10.2 10E3/UL (ref 4–11)

## 2024-11-04 PROCEDURE — P9604 ONE-WAY ALLOW PRORATED TRIP: HCPCS | Mod: ORL | Performed by: FAMILY MEDICINE

## 2024-11-04 PROCEDURE — 85027 COMPLETE CBC AUTOMATED: CPT | Mod: ORL | Performed by: FAMILY MEDICINE

## 2024-11-04 PROCEDURE — 80048 BASIC METABOLIC PNL TOTAL CA: CPT | Mod: ORL | Performed by: FAMILY MEDICINE

## 2024-11-04 PROCEDURE — 99309 SBSQ NF CARE MODERATE MDM 30: CPT | Performed by: FAMILY MEDICINE

## 2024-11-04 PROCEDURE — 36415 COLL VENOUS BLD VENIPUNCTURE: CPT | Mod: ORL | Performed by: FAMILY MEDICINE

## 2024-11-04 NOTE — LETTER
11/4/2024      Cam Hernandez  [address] [address]        [facility] GERIATRICS  [place] Medical Record Number:  [id number]  Place of Service where encounter took place: [facility] (TCU) [[id number]]  CODE STATUS:   DNR / DNI      Chief Complaint/Reason for Visit:  Chief Complaint   Patient presents with     RECHECK     TCU 11/4/2024.        TCU HPI:    Cam Hernandez is a 74 year old male with hx of IDDM presented to the hospital on 9/14/2024 after a fall in which he sustained a right hip fracture as noted below.    [facility]  Hospitalist Discharge Summary    Date of Admission:  9/14/2024  Date of Discharge:  9/17/2024     Hospital Course  74-year-old diabetic on insulin pump with diabetic neuropathy who presented with right hip pain and was found to have right intertrochanteric hip fracture and underwent ORIF on 9/14.  Patient denies any postoperative pain.     Type 1 diabetes on insulin pump  -Blood sugars reasonably well-controlled      ARF  -- Creatinine increased to 1.3  -Creatinine improved with normal saline bolus  --        Creatinine   Date Value Ref Range Status   09/16/2024 1.33 (H) 0.67 - 1.17 mg/dL Final   -- Baseline creatinine of 1.2     Leukocytosis likely reactive  -- Trending down  -- No evidence of DVT        WBC Count   Date Value Ref Range Status   09/15/2024 13.9 (H) 4.0 - 11.0 10e3/uL Final   -- Echo is unremarkable     Left hip pain noted on 9/16  -- x-ray of the left hip which showed degenerative changes  -- No fracture or dislocation  -- Continue physical therapy.     Acute urinary retention  -- Failed weaning trial.  Allison's inserted on 9/15  -- Trial of voiding in 1 week     Other medical conditions include  Hyperlipidemia, obesity, diabetic neuropathy,?Sclerosis     Overall stabilized and discharged to TCU on 9/17/2024 for PT, OT, nursing cares, medical management and monitoring.     Today:  He saw orthopedics on 10/2/2024, NNO,  continue flat foot restricted WB, RTC 4 weeks, had follow up appt October 30th. Has orders now to progress with weight bearing, will have next follow up appt in December. Reports very minimal pain in right hip. Also minimal left hip, had been bothered by left hip pain but notes it is improved and he now wonders if it was more of a sciatica type pain on left rather than his DJD as the pain is much improved, much less pain on left.     He saw urology due to urinary retention on 10/15/2024, started on Flomax, catheter stayed in, had follow up on 10/25/2024 and moore removed, has now been voiding without difficulty without the catheter anymore. He denies abdominal pain.     No SOB, chest pain. Appetite is good. No complaints of constipation.       PAST MEDICAL HISTORY:  Past Medical History:   Diagnosis Date     Chronic kidney disease      Chronic osteoarthritis      Type 1 diabetes (H)        MEDICATIONS:  Current Outpatient Medications   Medication Sig Dispense Refill     acetaminophen (TYLENOL) 325 MG tablet Take 3 tablets (975 mg) by mouth every 8 hours. 100 tablet 0     aspirin 81 MG EC tablet Take 1 tablet (81 mg) by mouth 2 times daily. 60 tablet 0     Barberry-Oreg Grape-Goldenseal (BERBERINE COMPLEX PO) Take 1 capsule by mouth daily       blood glucose (NO BRAND SPECIFIED) test strip Use to test blood sugar 6-7 times daily or as directed.       Cholecalciferol (VITAMIN D-3) 125 MCG (5000 UT) TABS Take 5,000 Units by mouth daily        Continuous Glucose  (DEXCOM G7 ) KASSANDRA USE AS DIRECTED PER  INSTRUCTIONS       Continuous Glucose Sensor (DEXCOM G7 SENSOR) MISC USE AS DIRECTED PER  INSTRUCTIONS. REPLACE SENSOR EVERY 10 DAYS       diclofenac (VOLTAREN) 1 % topical gel Apply 4 g topically 4 times daily. Apply to the LEFT hip/groin area (not right) 50 g 0     HERBALS Take 1 each by mouth daily (Mushroom complex 6)       insulin lispro (HUMALOG VIAL) 100 UNIT/ML vial Inject  "subcutaneously 3 times daily (before meals). INJECT 60 UNITS UNDER THE SKIN AS DIRECTED. BOLUS UP TO 30 UNITS DAILY.  UNITS DAILY. USE WITH INSULIN PUMP       INSULIN PUMP - OUTPATIENT Per Endocrinology  Medtronic       Multiple Vitamins-Minerals (MULTIVITAMIN MEN) TABS Take 1 tablet by mouth daily       polyethylene glycol (MIRALAX) 17 GM/Dose powder Take 1 capful. by mouth daily as needed.       pravastatin (PRAVACHOL) 40 MG tablet Take 40 mg by mouth daily.       probiotic CAPS Take 1 capsule by mouth daily (Zenwise- pre-and pro-biotic)       senna-docusate (SENOKOT-S/PERICOLACE) 8.6-50 MG tablet Take 2 tablets by mouth 2 times daily as needed for constipation. 60 tablet 0     tamsulosin (FLOMAX) 0.4 MG capsule Take 1 capsule (0.4 mg) by mouth daily.          PHYSICAL EXAM:  General: Patient is alert male, no distress.   Vitals: /61   Pulse 93   Temp 98  F (36.7  C)   Resp 16   Ht 1.854 m (6' 1\")   Wt 114.8 kg (253 lb)   SpO2 96%   BMI 33.38 kg/m    HEENT: Head is NCAT. Eyes show no injection or icterus. Nares negative. Oropharynx well hydrated.  Lungs: Non labored respirations.   : No moore anymore.  Extremities: No edema.  Musculoskeletal: Degen changes.   Skin: Warm and dry.   Psych: Mood is good.      LABS/DIAGNOSTIC DATA:  Component      Latest Ref Rn 9/14/2024  10:36 AM 9/15/2024  5:47 AM 9/16/2024  6:08 AM 9/20/2024  6:09 AM   Hemoglobin      13.3 - 17.7 g/dL 14.6  13.3  11.1 (L)  11.4 (L)    WBC      4.0 - 11.0 10e3/uL 14.7 (H)  13.9 (H)   7.6    RBC Count      4.40 - 5.90 10e6/uL 4.88  4.41   3.81 (L)    Hematocrit      40.0 - 53.0 % 44.4  40.4   36.3 (L)    MCV      78 - 100 fL 91  92   95    MCH      26.5 - 33.0 pg 29.9  30.2   29.9    MCHC      31.5 - 36.5 g/dL 32.9  32.9   31.4 (L)    RDW      10.0 - 15.0 % 13.3  13.3   13.4    Platelet Count      150 - 450 10e3/uL 300  287   338       Component      Latest Ref Rng 9/14/2024  10:36 AM 9/15/2024  5:47 AM 9/16/2024  6:08 AM " "9/20/2024  6:09 AM   Sodium      135 - 145 mmol/L 142  136   140    Anion Gap      7 - 15 mmol/L 9  12   7    Calcium      8.8 - 10.4 mg/dL 8.7 (L)  8.6 (L)   8.6 (L)    Creatinine      0.67 - 1.17 mg/dL 1.17  1.39 (H)  1.33 (H)  1.13    GFR Estimate      >60 mL/min/1.73m2 65  53 (L)  56 (L)  68    Potassium      3.4 - 5.3 mmol/L 4.2  4.6   4.6    Carbon Dioxide (CO2)      22 - 29 mmol/L 27  23   29    Urea Nitrogen      8.0 - 23.0 mg/dL 22.6  27.6 (H)   20.9    Chloride      98 - 107 mmol/L 106  101   104         ASSESSMENT/PLAN:  Right hip fracture. S/p 9/14/2024 Right hip trochanteric nailing with cephalomedullary device. Saw ortho on 10/2/2024, \"flat foot WB with walker x 4 weeks\". Had follow up appt October 30th. Has orders now to progress with weight bearing over 6 weeks and will have next follow up appt in December.  Urinary retention. Failed TOV, moore replaced 9/26/2024. Saw urology on 10/15/2024, started Flomax, no TOV done. Had follow up on 10/25/2024 and moore removed, has now been voiding without difficulty without the catheter anymore.  ABLA. intervention.   CKD.   DM. Insulin lispro with meals. Has insulin pump. Follow accuchecks.      Electronically signed by: Estrellita Abel MD       Sincerely,        Estrellita Abel MD      "

## 2024-11-05 VITALS
OXYGEN SATURATION: 96 % | HEART RATE: 93 BPM | HEIGHT: 73 IN | WEIGHT: 253 LBS | RESPIRATION RATE: 16 BRPM | DIASTOLIC BLOOD PRESSURE: 61 MMHG | BODY MASS INDEX: 33.53 KG/M2 | SYSTOLIC BLOOD PRESSURE: 111 MMHG | TEMPERATURE: 98 F

## 2024-11-06 NOTE — PROGRESS NOTES
[facility] GERIATRICS  [place] Medical Record Number:  [id number]  Place of Service where encounter took place: [facility] [[id number]]  CODE STATUS:   DNR / DNI      Chief Complaint/Reason for Visit:  Chief Complaint   Patient presents with    RECHECK     TCU 11/4/2024.        TCU HPI:    Cam Hernandez is a 74 year old male with hx of IDDM presented to the hospital on 9/14/2024 after a fall in which he sustained a right hip fracture as noted below.    [facility]  Hospitalist Discharge Summary    Date of Admission:  9/14/2024  Date of Discharge:  9/17/2024     Hospital Course  74-year-old diabetic on insulin pump with diabetic neuropathy who presented with right hip pain and was found to have right intertrochanteric hip fracture and underwent ORIF on 9/14.  Patient denies any postoperative pain.     Type 1 diabetes on insulin pump  -Blood sugars reasonably well-controlled      ARF  -- Creatinine increased to 1.3  -Creatinine improved with normal saline bolus  --        Creatinine   Date Value Ref Range Status   09/16/2024 1.33 (H) 0.67 - 1.17 mg/dL Final   -- Baseline creatinine of 1.2     Leukocytosis likely reactive  -- Trending down  -- No evidence of DVT        WBC Count   Date Value Ref Range Status   09/15/2024 13.9 (H) 4.0 - 11.0 10e3/uL Final   -- Echo is unremarkable     Left hip pain noted on 9/16  -- x-ray of the left hip which showed degenerative changes  -- No fracture or dislocation  -- Continue physical therapy.     Acute urinary retention  -- Failed weaning trial.  Allison's inserted on 9/15  -- Trial of voiding in 1 week     Other medical conditions include  Hyperlipidemia, obesity, diabetic neuropathy,?Sclerosis     Overall stabilized and discharged to TCU on 9/17/2024 for PT, OT, nursing cares, medical management and monitoring.     Today:  He saw orthopedics on 10/2/2024, NNO, continue flat foot restricted WB, RTC 4 weeks, had follow up appt October 30th. Has  orders now to progress with weight bearing, will have next follow up appt in December. Reports very minimal pain in right hip. Also minimal left hip, had been bothered by left hip pain but notes it is improved and he now wonders if it was more of a sciatica type pain on left rather than his DJD as the pain is much improved, much less pain on left.     He saw urology due to urinary retention on 10/15/2024, started on Flomax, catheter stayed in, had follow up on 10/25/2024 and moore removed, has now been voiding without difficulty without the catheter anymore. He denies abdominal pain.     No SOB, chest pain. Appetite is good. No complaints of constipation.       PAST MEDICAL HISTORY:  Past Medical History:   Diagnosis Date    Chronic kidney disease     Chronic osteoarthritis     Type 1 diabetes (H)        MEDICATIONS:  Current Outpatient Medications   Medication Sig Dispense Refill    acetaminophen (TYLENOL) 325 MG tablet Take 3 tablets (975 mg) by mouth every 8 hours. 100 tablet 0    aspirin 81 MG EC tablet Take 1 tablet (81 mg) by mouth 2 times daily. 60 tablet 0    Barberry-Oreg Grape-Goldenseal (BERBERINE COMPLEX PO) Take 1 capsule by mouth daily      blood glucose (NO BRAND SPECIFIED) test strip Use to test blood sugar 6-7 times daily or as directed.      Cholecalciferol (VITAMIN D-3) 125 MCG (5000 UT) TABS Take 5,000 Units by mouth daily       Continuous Glucose  (DEXCOM G7 ) KASSANDRA USE AS DIRECTED PER  INSTRUCTIONS      Continuous Glucose Sensor (DEXCOM G7 SENSOR) MISC USE AS DIRECTED PER  INSTRUCTIONS. REPLACE SENSOR EVERY 10 DAYS      diclofenac (VOLTAREN) 1 % topical gel Apply 4 g topically 4 times daily. Apply to the LEFT hip/groin area (not right) 50 g 0    HERBALS Take 1 each by mouth daily (Mushroom complex 6)      insulin lispro (HUMALOG VIAL) 100 UNIT/ML vial Inject subcutaneously 3 times daily (before meals). INJECT 60 UNITS UNDER THE SKIN AS DIRECTED. BOLUS UP  "TO 30 UNITS DAILY.  UNITS DAILY. USE WITH INSULIN PUMP      INSULIN PUMP - OUTPATIENT Per Endocrinology  Medtronic      Multiple Vitamins-Minerals (MULTIVITAMIN MEN) TABS Take 1 tablet by mouth daily      polyethylene glycol (MIRALAX) 17 GM/Dose powder Take 1 capful. by mouth daily as needed.      pravastatin (PRAVACHOL) 40 MG tablet Take 40 mg by mouth daily.      probiotic CAPS Take 1 capsule by mouth daily (Zenwise- pre-and pro-biotic)      senna-docusate (SENOKOT-S/PERICOLACE) 8.6-50 MG tablet Take 2 tablets by mouth 2 times daily as needed for constipation. 60 tablet 0    tamsulosin (FLOMAX) 0.4 MG capsule Take 1 capsule (0.4 mg) by mouth daily.          PHYSICAL EXAM:  General: Patient is alert male, no distress.   Vitals: /61   Pulse 93   Temp 98  F (36.7  C)   Resp 16   Ht 1.854 m (6' 1\")   Wt 114.8 kg (253 lb)   SpO2 96%   BMI 33.38 kg/m    HEENT: Head is NCAT. Eyes show no injection or icterus. Nares negative. Oropharynx well hydrated.  Lungs: Non labored respirations.   : No moore anymore.  Extremities: No edema.  Musculoskeletal: Degen changes.   Skin: Warm and dry.   Psych: Mood is good.      LABS/DIAGNOSTIC DATA:  Component      Latest Ref Rng 9/14/2024  10:36 AM 9/15/2024  5:47 AM 9/16/2024  6:08 AM 9/20/2024  6:09 AM   Hemoglobin      13.3 - 17.7 g/dL 14.6  13.3  11.1 (L)  11.4 (L)    WBC      4.0 - 11.0 10e3/uL 14.7 (H)  13.9 (H)   7.6    RBC Count      4.40 - 5.90 10e6/uL 4.88  4.41   3.81 (L)    Hematocrit      40.0 - 53.0 % 44.4  40.4   36.3 (L)    MCV      78 - 100 fL 91  92   95    MCH      26.5 - 33.0 pg 29.9  30.2   29.9    MCHC      31.5 - 36.5 g/dL 32.9  32.9   31.4 (L)    RDW      10.0 - 15.0 % 13.3  13.3   13.4    Platelet Count      150 - 450 10e3/uL 300  287   338       Component      Latest Ref Rng 9/14/2024  10:36 AM 9/15/2024  5:47 AM 9/16/2024  6:08 AM 9/20/2024  6:09 AM   Sodium      135 - 145 mmol/L 142  136   140    Anion Gap      7 - 15 mmol/L 9  12   7  " "  Calcium      8.8 - 10.4 mg/dL 8.7 (L)  8.6 (L)   8.6 (L)    Creatinine      0.67 - 1.17 mg/dL 1.17  1.39 (H)  1.33 (H)  1.13    GFR Estimate      >60 mL/min/1.73m2 65  53 (L)  56 (L)  68    Potassium      3.4 - 5.3 mmol/L 4.2  4.6   4.6    Carbon Dioxide (CO2)      22 - 29 mmol/L 27  23   29    Urea Nitrogen      8.0 - 23.0 mg/dL 22.6  27.6 (H)   20.9    Chloride      98 - 107 mmol/L 106  101   104         ASSESSMENT/PLAN:  Right hip fracture. S/p 9/14/2024 Right hip trochanteric nailing with cephalomedullary device. Saw ortho on 10/2/2024, \"flat foot WB with walker x 4 weeks\". Had follow up appt October 30th. Has orders now to progress with weight bearing over 6 weeks and will have next follow up appt in December.  Urinary retention. Failed TOV, moore replaced 9/26/2024. Saw urology on 10/15/2024, started Flomax, no TOV done. Had follow up on 10/25/2024 and moore removed, has now been voiding without difficulty without the catheter anymore.  ABLA. intervention.   CKD.   DM. Insulin lispro with meals. Has insulin pump. Follow accuchecks.      Electronically signed by: Estrellita Abel MD     "

## 2024-11-14 ENCOUNTER — LAB REQUISITION (OUTPATIENT)
Dept: LAB | Facility: CLINIC | Age: 74
End: 2024-11-14
Payer: COMMERCIAL

## 2024-11-14 ENCOUNTER — TELEPHONE (OUTPATIENT)
Dept: GERIATRICS | Facility: CLINIC | Age: 74
End: 2024-11-14

## 2024-11-14 ENCOUNTER — TRANSITIONAL CARE UNIT VISIT (OUTPATIENT)
Dept: GERIATRICS | Facility: CLINIC | Age: 74
End: 2024-11-14
Payer: COMMERCIAL

## 2024-11-14 VITALS
TEMPERATURE: 97.8 F | OXYGEN SATURATION: 94 % | HEART RATE: 103 BPM | RESPIRATION RATE: 16 BRPM | WEIGHT: 246.6 LBS | HEIGHT: 73 IN | BODY MASS INDEX: 32.68 KG/M2 | DIASTOLIC BLOOD PRESSURE: 74 MMHG | SYSTOLIC BLOOD PRESSURE: 140 MMHG

## 2024-11-14 DIAGNOSIS — M16.51 POST-TRAUMATIC OSTEOARTHRITIS OF RIGHT HIP: ICD-10-CM

## 2024-11-14 DIAGNOSIS — R50.9 FEVER, UNSPECIFIED: ICD-10-CM

## 2024-11-14 DIAGNOSIS — R53.81 PHYSICAL DECONDITIONING: ICD-10-CM

## 2024-11-14 DIAGNOSIS — S72.001A HIP FRACTURE, RIGHT, CLOSED, INITIAL ENCOUNTER (H): Primary | ICD-10-CM

## 2024-11-14 DIAGNOSIS — M25.552 HIP PAIN, LEFT: ICD-10-CM

## 2024-11-14 DIAGNOSIS — N39.0 URINARY TRACT INFECTION WITHOUT HEMATURIA, SITE UNSPECIFIED: ICD-10-CM

## 2024-11-14 DIAGNOSIS — D50.9 IRON DEFICIENCY ANEMIA, UNSPECIFIED: ICD-10-CM

## 2024-11-14 DIAGNOSIS — R33.9 URINARY RETENTION: ICD-10-CM

## 2024-11-14 DIAGNOSIS — E11.9 TYPE 2 DIABETES MELLITUS WITHOUT COMPLICATIONS (H): ICD-10-CM

## 2024-11-14 LAB
ALBUMIN UR-MCNC: 30 MG/DL
APPEARANCE UR: ABNORMAL
BACTERIA #/AREA URNS HPF: ABNORMAL /HPF
BILIRUB UR QL STRIP: NEGATIVE
COLOR UR AUTO: YELLOW
GLUCOSE UR STRIP-MCNC: NEGATIVE MG/DL
HGB UR QL STRIP: NEGATIVE
KETONES UR STRIP-MCNC: ABNORMAL MG/DL
LEUKOCYTE ESTERASE UR QL STRIP: ABNORMAL
MUCOUS THREADS #/AREA URNS LPF: PRESENT /LPF
NITRATE UR QL: POSITIVE
PH UR STRIP: 5.5 [PH] (ref 5–7)
RBC URINE: 3 /HPF
SP GR UR STRIP: 1.02 (ref 1–1.03)
SQUAMOUS EPITHELIAL: <1 /HPF
UROBILINOGEN UR STRIP-MCNC: NORMAL MG/DL
WBC CLUMPS #/AREA URNS HPF: PRESENT /HPF
WBC URINE: 89 /HPF

## 2024-11-14 PROCEDURE — 99309 SBSQ NF CARE MODERATE MDM 30: CPT

## 2024-11-14 PROCEDURE — 87186 SC STD MICRODIL/AGAR DIL: CPT | Mod: ORL

## 2024-11-14 PROCEDURE — 81001 URINALYSIS AUTO W/SCOPE: CPT | Mod: ORL

## 2024-11-14 NOTE — LETTER
11/14/2024      Cam Hernandez  [address] [facility] [address]        [facility] GERIATRICS    PRIMARY CARE PROVIDER AND CLINIC:  Soto Gaston MD, [address] [facility] [address]  Chief Complaint   Patient presents with     RECHECK      [place] Medical Record Number:  [id number]  Place of Service where encounter took place:  [facility] ([facility]) [[id number]]    HPI: PMH  of type 1 diabetes, neuropathy, HLD, NBA with CPAP use, presented to emergency room with mechanical fall that led to right intertrochanteric  hip fracture post fall.  Orthopedic procedure completed on 9/14/2024 right hip trochanteric nailing with cephalomedullary device.He transferred to [facility] for rehab and  medical management.    History obtained from: facility chart records, facility staff, patient report, [facility] chart review, and Care Everywhere [facility] chart review       Today,  is seen in his room lying on the bed.  He is using walker for transfer and recently ambulated 200 feet with therapy.  Therapy thinks likely discharging in 1 to 2 weeks.  He is nauseous today early this morning with dry hives.  He is eating soup for lunch no nausea reported since then.  He denies any other acute issues today he manages his insulin pump independently.  Staff reports he is doing well.    CODE STATUS/ADVANCE DIRECTIVES DISCUSSION:  No CPR- Do NOT Intubate  CPR/Full code   ALLERGIES:   Allergies   Allergen Reactions     Ammonium Lac Itching     Legs very red, excessive itching     Amoxicillin Nausea     Other reaction(s): nausea     Erythromycin Nausea     Naprosyn [Naproxen] Unknown     9/14/24 taking aspirin 81 mg pta/given inpt at [facility]      PAST MEDICAL HISTORY:   Past Medical History:   Diagnosis Date     Chronic kidney disease      Chronic osteoarthritis      Type 1 diabetes (H)       PAST SURGICAL HISTORY:   has a past surgical history that includes Open reduction internal fixation hip nailing (Right,  9/14/2024).  FAMILY HISTORY: family history is not on file.  SOCIAL HISTORY:   reports that he quit smoking about 11 years ago. His smoking use included cigars. He has never used smokeless tobacco.      Post Discharge Medication Reconciliation Status: Updated         Current Outpatient Medications   Medication Sig Dispense Refill     acetaminophen (TYLENOL) 325 MG tablet Take 3 tablets (975 mg) by mouth every 8 hours. 100 tablet 0     aspirin 81 MG EC tablet Take 1 tablet (81 mg) by mouth 2 times daily. 60 tablet 0     Barberry-Oreg Grape-Goldenseal (BERBERINE COMPLEX PO) Take 1 capsule by mouth daily       blood glucose (NO BRAND SPECIFIED) test strip Use to test blood sugar 6-7 times daily or as directed.       Cholecalciferol (VITAMIN D-3) 125 MCG (5000 UT) TABS Take 5,000 Units by mouth daily        Continuous Glucose  (DEXCOM G7 ) KASSANDRA USE AS DIRECTED PER  INSTRUCTIONS       Continuous Glucose Sensor (DEXCOM G7 SENSOR) MISC USE AS DIRECTED PER  INSTRUCTIONS. REPLACE SENSOR EVERY 10 DAYS       diclofenac (VOLTAREN) 1 % topical gel Apply 4 g topically 4 times daily. Apply to the LEFT hip/groin area (not right) 50 g 0     HERBALS Take 1 each by mouth daily (Mushroom complex 6)       insulin lispro (HUMALOG VIAL) 100 UNIT/ML vial Inject subcutaneously 3 times daily (before meals). INJECT 60 UNITS UNDER THE SKIN AS DIRECTED. BOLUS UP TO 30 UNITS DAILY.  UNITS DAILY. USE WITH INSULIN PUMP       INSULIN PUMP - OUTPATIENT Per Endocrinology  Medtronic       Multiple Vitamins-Minerals (MULTIVITAMIN MEN) TABS Take 1 tablet by mouth daily       polyethylene glycol (MIRALAX) 17 GM/Dose powder Take 1 capful. by mouth daily as needed.       pravastatin (PRAVACHOL) 40 MG tablet Take 40 mg by mouth daily.       probiotic CAPS Take 1 capsule by mouth daily (Zenwise- pre-and pro-biotic)       senna-docusate (SENOKOT-S/PERICOLACE) 8.6-50 MG tablet Take 2 tablets by mouth 2 times daily  "as needed for constipation. 60 tablet 0     tamsulosin (FLOMAX) 0.4 MG capsule Take 1 capsule (0.4 mg) by mouth daily.       No current facility-administered medications for this visit.       ROS:  4 point ROS including Respiratory, CV, GI and , other than that noted in the HPI,  is negative    Vitals:  BP (!) 140/74   Pulse 103   Temp 97.8  F (36.6  C)   Resp 16   Ht 1.854 m (6' 1\")   Wt 111.9 kg (246 lb 9.6 oz)   SpO2 94%   BMI 32.53 kg/m    Exam:  GENERAL APPEARANCE:  Alert, in no distress, morbidly obese  NECK:  No adenopathy,masses or thyromegaly  RESP:  respiratory effort and palpation of chest normal, lungs clear to auscultation , no respiratory distress  CV:  regular rate and rhythm, no murmur, rub, or gallop, trace edema BLE  ABDOMEN:  normal bowel sounds, soft, nontender, no hepatosplenomegaly or other masses, reports constipation, no guarding or rebound, bowel sounds normal  :    Allison removed trial of void unsuccessful no issue  M/S:   Using walker and wheelchair for transfer  SKIN:  wound healing well, no signs of infection site is covered no sign of infection  NEURO:   Examination of sensation by touch normal  PSYCH:  oriented X 3    Lab/Diagnostic data:  Most Recent 3 CBC's:  Recent Labs   Lab Test 11/04/24  0637 10/11/24  0523 09/20/24  0609   WBC 10.2 7.0 7.6   HGB 11.2* 11.3* 11.4*   MCV 94 95 95   * 442 338     Most Recent 3 BMP's:  Recent Labs   Lab Test 11/04/24  0637 09/20/24  0609 09/17/24  1210 09/16/24  0748 09/16/24  0608 09/15/24  0710 09/15/24  0547    140  --   --   --   --  136   POTASSIUM 4.1 4.6  --   --   --   --  4.6   CHLORIDE 104 104  --   --   --   --  101   CO2 27 29  --   --   --   --  23   BUN 23.1* 20.9  --   --   --   --  27.6*   CR 1.13 1.13  --   --  1.33*  --  1.39*   ANIONGAP 8 7  --   --   --   --  12   ETHEL 9.0 8.6*  --   --   --   --  8.6*   GLC 67* 158* 118*   < >  --    < > 200*    < > = values in this interval not displayed. "       ASSESSMENT/PLAN:  (S72.001A) Hip fracture, right, closed,  (primary encounter diagnosis)  (M25.551) Right hip pain  (M16.51) Post-traumatic osteoarthritis of   (R53.81) Physical deconditioning  Comment: Acute on chronic right intertrochanteric hip fracture and underwent ORIF on 9/14/24 incision site  open to air and intact. Activity as tolerated since 10/02/2024 for 4 weeks . Slow progression with therapy working on transferring and weightbearing as tolerated recently ambulated to 200 feet with rolling walker.  Plan:   - Pain managed with  APAP  -Follow-up with orthopedics as needed  -PT/OT ongoing  - ASA for anticoagulation BID open-ended      (E10.69) Type 1 diabetes mellitus with other specified complication (H)  Comment: No episodes of of hyperglycemia reported BS controlled  Last A1C 6.1.BS ranging 150-180.  Patient requesting A1c recheck will order with the next lab.  Plan:   - Continue Continues insuline use via self managed pump  -Monitor for changes  -Hemoglobin A1c recheck 11/15/2024        Orders:  - Lab due CBC, Hemoglobin A1C 11/15/24    Electronically signed by: RUTH Nunez CNP         Sincerely,        RUTH Nunez CNP

## 2024-11-14 NOTE — PROGRESS NOTES
[facility] GERIATRICS    PRIMARY CARE PROVIDER AND CLINIC:  [facility] Gaston MD, [address] [facility] [address]  Chief Complaint   Patient presents with    RECHECK      [place] Medical Record Number:  [id number]  Place of Service where encounter took place:  [facility] ([facility]) [[id number]]    HPI: PMH  of type 1 diabetes, neuropathy, HLD, NBA with CPAP use, presented to emergency room with mechanical fall that led to right intertrochanteric  hip fracture post fall.  Orthopedic procedure completed on 9/14/2024 right hip trochanteric nailing with cephalomedullary device.[facility] for [facility]hab and  medical management.    History obtained from: facility chart records, facility staff, patient report, [facility] chart review, and Care Everywhere [facility] chart review       Today,  is seen in his room lying on the bed.  He is using walker for transfer and recently ambulated 200 feet with therapy.  Therapy thinks likely discharging in 1 to 2 weeks.  He is nauseous today early this morning with dry hives.  He is eating soup for lunch no nausea reported since then.  He denies any other acute issues today he manages his insulin pump independently.  Staff reports he is doing well.    Later in the evening staff called triage with low grade fever and law looking  urine . UA/UC with BMP added to the order. UA back today 11/15/24 with UTI given prior treatment history will start Cipro before culture is back. Low threshold for hospitalization monitor VSS q4hr for next 48hr and with any hypotension or tachycardia send patient to ED over the weekend .    CODE STATUS/ADVANCE DIRECTIVES DISCUSSION:  No CPR- Do NOT Intubate  CPR/Full code   ALLERGIES:   Allergies   Allergen Reactions    Ammonium Lac Itching     Legs very red, excessive itching    Amoxicillin Nausea     Other reaction(s): nausea    Erythromycin Nausea    Naprosyn [Naproxen] Unknown     9/14/24 taking aspirin 81 mg pta/given inpt at [facility]       PAST MEDICAL HISTORY:   Past Medical History:   Diagnosis Date    Chronic kidney disease     Chronic osteoarthritis     Type 1 diabetes (H)       PAST SURGICAL HISTORY:   has a past surgical history that includes Open reduction internal fixation hip nailing (Right, 9/14/2024).  FAMILY HISTORY: family history is not on file.  SOCIAL HISTORY:   reports that he quit smoking about 11 years ago. His smoking use included cigars. He has never used smokeless tobacco.      Post Discharge Medication Reconciliation Status: Updated         Current Outpatient Medications   Medication Sig Dispense Refill    acetaminophen (TYLENOL) 325 MG tablet Take 3 tablets (975 mg) by mouth every 8 hours. 100 tablet 0    aspirin 81 MG EC tablet Take 1 tablet (81 mg) by mouth 2 times daily. 60 tablet 0    Barberry-Oreg Grape-Goldenseal (BERBERINE COMPLEX PO) Take 1 capsule by mouth daily      blood glucose (NO BRAND SPECIFIED) test strip Use to test blood sugar 6-7 times daily or as directed.      Cholecalciferol (VITAMIN D-3) 125 MCG (5000 UT) TABS Take 5,000 Units by mouth daily       Continuous Glucose  (DEXCOM G7 ) KASSANDRA USE AS DIRECTED PER  INSTRUCTIONS      Continuous Glucose Sensor (DEXCOM G7 SENSOR) MISC USE AS DIRECTED PER  INSTRUCTIONS. REPLACE SENSOR EVERY 10 DAYS      diclofenac (VOLTAREN) 1 % topical gel Apply 4 g topically 4 times daily. Apply to the LEFT hip/groin area (not right) 50 g 0    HERBALS Take 1 each by mouth daily (Mushroom complex 6)      insulin lispro (HUMALOG VIAL) 100 UNIT/ML vial Inject subcutaneously 3 times daily (before meals). INJECT 60 UNITS UNDER THE SKIN AS DIRECTED. BOLUS UP TO 30 UNITS DAILY.  UNITS DAILY. USE WITH INSULIN PUMP      INSULIN PUMP - OUTPATIENT Per Endocrinology  [employer]tronic      Multiple Vitamins-Minerals (MULTIVITAMIN MEN) TABS Take 1 tablet by mouth daily      polyethylene glycol (MIRALAX) 17 GM/Dose powder Take 1 capful. by mouth daily as  "needed.      pravastatin (PRAVACHOL) 40 MG tablet Take 40 mg by mouth daily.      probiotic CAPS Take 1 capsule by mouth daily (Zenwise- pre-and pro-biotic)      senna-docusate (SENOKOT-S/PERICOLACE) 8.6-50 MG tablet Take 2 tablets by mouth 2 times daily as needed for constipation. 60 tablet 0    tamsulosin (FLOMAX) 0.4 MG capsule Take 1 capsule (0.4 mg) by mouth daily.       No current facility-administered medications for this visit.       ROS:  4 point ROS including Respiratory, CV, GI and , other than that noted in the HPI,  is negative    Vitals:  BP (!) 140/74   Pulse 103   Temp 97.8  F (36.6  C)   Resp 16   Ht 1.854 m (6' 1\")   Wt 111.9 kg (246 lb 9.6 oz)   SpO2 94%   BMI 32.53 kg/m    Exam:  GENERAL APPEARANCE:  Alert, in no distress, morbidly obese  NECK:  No adenopathy,masses or thyromegaly  RESP:  respiratory effort and palpation of chest normal, lungs clear to auscultation , no respiratory distress  CV:  regular rate and rhythm, no murmur, rub, or gallop, trace edema BLE  ABDOMEN:  normal bowel sounds, soft, nontender, no hepatosplenomegaly or other masses, reports constipation, no guarding or rebound, bowel sounds normal  :    Allison removed trial of void unsuccessful no issue  M/S:   Using walker and wheelchair for transfer  SKIN:  wound healing well, no signs of infection site is covered no sign of infection  NEURO:   Examination of sensation by touch normal  PSYCH:  oriented X 3    Lab/Diagnostic data:  Most Recent 3 CBC's:  Recent Labs   Lab Test 11/04/24  0637 10/11/24  0523 09/20/24  0609   WBC 10.2 7.0 7.6   HGB 11.2* 11.3* 11.4*   MCV 94 95 95   * 442 338     Most Recent 3 BMP's:  Recent Labs   Lab Test 11/04/24  0637 09/20/24  0609 09/17/24  1210 09/16/24  0748 09/16/24  0608 09/15/24  0710 09/15/24  0547    140  --   --   --   --  136   POTASSIUM 4.1 4.6  --   --   --   --  4.6   CHLORIDE 104 104  --   --   --   --  101   CO2 27 29  --   --   --   --  23   BUN 23.1* 20.9 "  --   --   --   --  27.6*   CR 1.13 1.13  --   --  1.33*  --  1.39*   ANIONGAP 8 7  --   --   --   --  12   ETHEL 9.0 8.6*  --   --   --   --  8.6*   GLC 67* 158* 118*   < >  --    < > 200*    < > = values in this interval not displayed.       ASSESSMENT/PLAN:  (S72.001A) Hip fracture, right, closed,  (primary encounter diagnosis)  (M25.551) Right hip pain  (M16.51) Post-traumatic osteoarthritis of   (R53.81) Physical deconditioning  Comment: Acute on chronic right intertrochanteric hip fracture and underwent ORIF on 9/14/24 incision site  open to air and intact. Activity as tolerated since 10/02/2024 for 4 weeks . Slow progression with therapy working on transferring and weightbearing as tolerated recently ambulated to 200 feet with rolling walker.  Plan:   - Pain managed with  APAP  -Follow-up with orthopedics as needed  -PT/OT ongoing  - ASA for anticoagulation BID open-ended      (E10.69) Type 1 diabetes mellitus with other specified complication (H)  Comment: No episodes of of hyperglycemia reported BS controlled  Last A1C 6.1.BS ranging 150-180.  Patient requesting A1c recheck will order with the next lab.  Plan:   - Continue Continues insuline use via self managed pump  -Monitor for changes  -Hemoglobin A1c recheck 11/15/2024        (N39.0) Urinary tract infection without hematuria, site unspecified  (R33.9) Urinary retention  Comment: Acute on chronic with 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  Positive UTI on 10/10/2024 treated with Macrobid.  Allison discontinued after 10/15/2024 urology follow-up.  Plan:   -Continue Flomax  -Postvoid residual for the next 24 hours for urine>350 replace Allison   -Follow-up with urology TCU to arrange appointment  -Ciprofloxacin 500 mg BID X7 days  -Monitor for hypotension/tachycardia very low threshold to send to ED            Orders:  - Lab due CBC, Hemoglobin A1C 11/15/24  - UA/UC  - BMP   - Start ciprofloxacin 500mg BID x7 days     Electronically signed by:  RUTH Nunez CNP

## 2024-11-15 LAB
ANION GAP SERPL CALCULATED.3IONS-SCNC: 11 MMOL/L (ref 7–15)
BACTERIA UR CULT: ABNORMAL
BUN SERPL-MCNC: 26.7 MG/DL (ref 8–23)
CALCIUM SERPL-MCNC: 8.9 MG/DL (ref 8.8–10.4)
CHLORIDE SERPL-SCNC: 101 MMOL/L (ref 98–107)
CREAT SERPL-MCNC: 1.28 MG/DL (ref 0.67–1.17)
EGFRCR SERPLBLD CKD-EPI 2021: 59 ML/MIN/1.73M2
ERYTHROCYTE [DISTWIDTH] IN BLOOD BY AUTOMATED COUNT: 14.2 % (ref 10–15)
EST. AVERAGE GLUCOSE BLD GHB EST-MCNC: 114 MG/DL
GLUCOSE SERPL-MCNC: 167 MG/DL (ref 70–99)
HBA1C MFR BLD: 5.6 %
HCO3 SERPL-SCNC: 25 MMOL/L (ref 22–29)
HCT VFR BLD AUTO: 36.6 % (ref 40–53)
HGB BLD-MCNC: 11.6 G/DL (ref 13.3–17.7)
MCH RBC QN AUTO: 29.4 PG (ref 26.5–33)
MCHC RBC AUTO-ENTMCNC: 31.7 G/DL (ref 31.5–36.5)
MCV RBC AUTO: 93 FL (ref 78–100)
PLATELET # BLD AUTO: 368 10E3/UL (ref 150–450)
POTASSIUM SERPL-SCNC: 4.1 MMOL/L (ref 3.4–5.3)
RBC # BLD AUTO: 3.95 10E6/UL (ref 4.4–5.9)
SODIUM SERPL-SCNC: 137 MMOL/L (ref 135–145)
WBC # BLD AUTO: 10.9 10E3/UL (ref 4–11)

## 2024-11-15 PROCEDURE — 85027 COMPLETE CBC AUTOMATED: CPT | Mod: ORL | Performed by: FAMILY MEDICINE

## 2024-11-15 PROCEDURE — 80048 BASIC METABOLIC PNL TOTAL CA: CPT | Mod: ORL

## 2024-11-15 PROCEDURE — P9604 ONE-WAY ALLOW PRORATED TRIP: HCPCS | Mod: ORL | Performed by: FAMILY MEDICINE

## 2024-11-15 PROCEDURE — 36415 COLL VENOUS BLD VENIPUNCTURE: CPT | Mod: ORL | Performed by: FAMILY MEDICINE

## 2024-11-15 PROCEDURE — 83036 HEMOGLOBIN GLYCOSYLATED A1C: CPT | Mod: ORL | Performed by: FAMILY MEDICINE

## 2024-11-15 RX ORDER — CIPROFLOXACIN 500 MG/1
500 TABLET, FILM COATED ORAL 2 TIMES DAILY
Qty: 14 TABLET | Refills: 0 | Status: SHIPPED | OUTPATIENT
Start: 2024-11-15 | End: 2024-11-22

## 2024-11-15 NOTE — ADDENDUM NOTE
Addended by: KENNETH SINGH on: 11/15/2024 09:48 AM     Modules accepted: Orders, Level of Service

## 2024-11-18 ENCOUNTER — TRANSITIONAL CARE UNIT VISIT (OUTPATIENT)
Dept: GERIATRICS | Facility: CLINIC | Age: 74
End: 2024-11-18
Payer: COMMERCIAL

## 2024-11-18 VITALS
DIASTOLIC BLOOD PRESSURE: 72 MMHG | SYSTOLIC BLOOD PRESSURE: 130 MMHG | WEIGHT: 240.6 LBS | RESPIRATION RATE: 16 BRPM | HEIGHT: 73 IN | TEMPERATURE: 97.5 F | BODY MASS INDEX: 31.89 KG/M2 | HEART RATE: 72 BPM | OXYGEN SATURATION: 95 %

## 2024-11-18 DIAGNOSIS — R33.9 URINARY RETENTION: ICD-10-CM

## 2024-11-18 DIAGNOSIS — N39.0 URINARY TRACT INFECTION WITHOUT HEMATURIA, SITE UNSPECIFIED: Primary | ICD-10-CM

## 2024-11-18 DIAGNOSIS — E10.69 TYPE 1 DIABETES MELLITUS WITH OTHER SPECIFIED COMPLICATION (H): ICD-10-CM

## 2024-11-18 PROBLEM — E66.01 MORBID OBESITY (H): Status: RESOLVED | Noted: 2024-09-20 | Resolved: 2024-11-18

## 2024-11-18 PROCEDURE — 99309 SBSQ NF CARE MODERATE MDM 30: CPT

## 2024-11-18 NOTE — LETTER
11/18/2024      Cam Hernandez  [address] [facility] [address]        [facility] GERIATRICS    PRIMARY CARE PROVIDER AND CLINIC:  Soto Gaston MD, [address] [facility] [address]  Chief Complaint   Patient presents with     RECHECK      [place] Medical Record Number:  [id number]  Place of Service where encounter took place:  [facility] ([facility]) [[id number]]      Today,  is seen  to check on recent UTI diagnosis . He is seen in his room sitting on the chair after therapy . He reports doing well and negative post void residual over the weekend. No pain or discomfort when voiding.  He denies any other acute issues today he manages his insulin pump independently.  Staff reports he is doing well. VSS with in normal limits over the weekend he has urology follow up appointment on 12/11/24.        CODE STATUS/ADVANCE DIRECTIVES DISCUSSION:  No CPR- Do NOT Intubate  CPR/Full code   ALLERGIES:   Allergies   Allergen Reactions     Ammonium Lac Itching     Legs very red, excessive itching     Amoxicillin Nausea     Other reaction(s): nausea     Erythromycin Nausea     Naprosyn [Naproxen] Unknown     9/14/24 taking aspirin 81 mg pta/[facility]pt at [facility]      PAST MEDICAL HISTORY:   Past Medical History:   Diagnosis Date     Chronic kidney disease      Chronic osteoarthritis      Type 1 diabetes (H)       PAST SURGICAL HISTORY:   has a past surgical history that includes Open reduction internal fixation hip nailing (Right, 9/14/2024).  FAMILY HISTORY: family history is not on file.  SOCIAL HISTORY:   reports that he quit smoking about 11 years ago. His smoking use included cigars. He has never used smokeless tobacco.      Post Discharge Medication Reconciliation Status: Updated         Current Outpatient Medications   Medication Sig Dispense Refill     acetaminophen (TYLENOL) 325 MG tablet Take 3 tablets (975 mg) by mouth every 8 hours. 100 tablet 0     aspirin 81 MG EC tablet Take 1 tablet (81  "mg) by mouth 2 times daily. 60 tablet 0     Barberry-Oreg Grape-Goldenseal (BERBERINE COMPLEX PO) Take 1 capsule by mouth daily       blood glucose (NO BRAND SPECIFIED) test strip Use to test blood sugar 6-7 times daily or as directed.       Cholecalciferol (VITAMIN D-3) 125 MCG (5000 UT) TABS Take 5,000 Units by mouth daily        ciprofloxacin (CIPRO) 500 MG tablet Take 1 tablet (500 mg) by mouth 2 times daily for 7 days. 14 tablet 0     Continuous Glucose  (DEXCOM G7 ) KASSANDRA USE AS DIRECTED PER  INSTRUCTIONS       Continuous Glucose Sensor (DEXCOM G7 SENSOR) MISC USE AS DIRECTED PER  INSTRUCTIONS. REPLACE SENSOR EVERY 10 DAYS       HERBALS Take 1 each by mouth daily (Mushroom complex 6)       insulin lispro (HUMALOG VIAL) 100 UNIT/ML vial Inject subcutaneously 3 times daily (before meals). INJECT 60 UNITS UNDER THE SKIN AS DIRECTED. BOLUS UP TO 30 UNITS DAILY.  UNITS DAILY. USE WITH INSULIN PUMP       INSULIN PU[facility] - OUTPATIENT [facility]       Multiple Vitamins-Minerals (MULTIVITAMIN MEN) TABS Take 1 tablet by mouth daily       polyethylene glycol (MIRALAX) 17 GM/Dose powder Take 1 capful. by mouth daily as needed.       pravastatin (PRAVACHOL) 40 MG tablet Take 40 mg by mouth daily.       probiotic CAPS Take 1 capsule by mouth daily (Zenwise- pre-and pro-biotic)       senna-docusate (SENOKOT-S/PERICOLACE) 8.6-50 MG tablet Take 2 tablets by mouth 2 times daily as needed for constipation. 60 tablet 0     tamsulosin (FLOMAX) 0.4 MG capsule Take 1 capsule (0.4 mg) by mouth daily.       No current facility-administered medications for this visit.       ROS:  4 point ROS including Respiratory, CV, GI and , other than that noted in the HPI,  is negative    Vitals:  /72   Pulse 72   Temp 97.5  F (36.4  C)   Resp 16   Ht 1.854 m (6' 1\")   Wt 109.1 kg (240 lb 9.6 oz)   SpO2 95%   BMI 31.74 kg/m    Exam:  GENERAL APPEARANCE:  Alert, in no " distress, morbidly obese  NECK:  No adenopathy,masses or thyromegaly  RESP:  respiratory effort and palpation of chest normal, lungs clear to auscultation , no respiratory distress  CV:  regular rate and rhythm, no murmur, rub, or gallop, trace edema BLE  ABDOMEN:  normal bowel sounds, soft, nontender, no hepatosplenomegaly or other masses, reports constipation, no guarding or rebound, bowel sounds normal  :   No moore voiding ok no concern  M/S:   Using walker and wheelchair for transfer  SKIN:  wound healing well, no signs of infection site is open and healed no sign of infection  NEURO:   Examination of sensation by touch normal  PSYCH:  oriented X 3    Lab/Diagnostic data:  Most Recent 3 CBC's:  Recent Labs   Lab Test 11/15/24  0820 11/04/24  0637 10/11/24  0523   WBC 10.9 10.2 7.0   HGB 11.6* 11.2* 11.3*   MCV 93 94 95    454* 442     Most Recent 3 BMP's:  Recent Labs   Lab Test 11/15/24  0820 11/04/24  0637 09/20/24  0609    139 140   POTASSIUM 4.1 4.1 4.6   CHLORIDE 101 104 104   CO2 25 27 29   BUN 26.7* 23.1* 20.9   CR 1.28* 1.13 1.13   ANIONGAP 11 8 7   ETHEL 8.9 9.0 8.6*   * 67* 158*       ASSESSMENT/PLAN:  (N39.0) Urinary tract infection without hematuria, site unspecified  (R33.9) Urinary retention  Comment: Acute on chronic with 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  Positive UTI on 10/10/2024 treated with Macrobid.  Moore discontinued after 10/15/2024 urology follow-up. Recommend recheck of BMP when he is done with abx.  Plan:   -Continue Flomax  -Postvoid residual negative over the weekend   -Follow-up with urology on 12/11/24  -Ciprofloxacin 500 mg BID X7 days- to be completed 11/22/24  - BMP Due  after abx completion     (E10.69) Type 1 diabetes mellitus with other specified complication (H)  Comment: No episodes of of hyperglycemia reported BS controlled  Last A1C 6.1.BS ranging 150-180.  Per patient request A1C checked it was 5.6   Plan:   - Continue Continues  insuline use via self managed pump  -Monitor for changes  -Hemoglobin A1c every 3 month             Electronically signed by: RUTH Nunez CNP           Sincerely,        RUTH Nunez CNP

## 2024-11-18 NOTE — PROGRESS NOTES
[facility] GERIATRICS    PRIMARY CARE PROVIDER AND CLINIC:  Soto Gaston MD, [address] [facility] [address] [facility] [address]  Chief Complaint   [facility]      [place] Medical Record Number:  [id number]  Place of Service where encounter took place:  [facility] ([facility]) [[id number]]      Today,  is seen  to check on recent UTI diagnosis . He is seen in his room sitting on the chair after therapy . He reports doing well and negative post void residual over the weekend. No pain or discomfort when voiding.  He denies any other acute issues today he manages his insulin pump independently.  Staff reports he is doing well. VSS with in normal limits over the weekend he has urology follow up appointment on 12/11/24.        CODE STATUS/ADVANCE DIRECTIVES DISCUSSION:  No CPR- Do NOT Intubate  CPR/Full code   ALLERGIES:   Allergies   Allergen Reactions    Ammonium Lac Itching     Legs very red, excessive itching    Amoxicillin Nausea     Other reaction(s): nausea    Erythromycin Nausea    Naprosyn [Naproxen] Unknown     9/14/24 taking aspirin 81 mg pta/given inpt at [facility]      PAST MEDICAL HISTORY:   Past Medical History:   Diagnosis Date    Chronic kidney disease     Chronic osteoarthritis     Type 1 diabetes (H)       PAST SURGICAL HISTORY:   has a past surgical history that includes Open reduction internal fixation hip nailing (Right, 9/14/2024).  FAMILY HISTORY: family history is not on file.  SOCIAL HISTORY:   reports that he quit smoking about 11 years ago. His smoking use included cigars. He has never used smokeless tobacco.      Post Discharge Medication Reconciliation Status: Updated         Current Outpatient Medications   Medication Sig Dispense Refill    acetaminophen (TYLENOL) 325 MG tablet Take 3 tablets (975 mg) by mouth every 8 hours. 100 tablet 0    aspirin 81 MG EC tablet Take 1 tablet (81 mg) by mouth 2 times daily. 60 tablet 0    Barberry-Oreg Grape-Goldenseal (BERBERINE  "COMPLEX PO) Take 1 capsule by mouth daily      blood glucose (NO BRAND SPECIFIED) test strip Use to test blood sugar 6-7 times daily or as directed.      Cholecalciferol (VITAMIN D-3) 125 MCG (5000 UT) TABS Take 5,000 Units by mouth daily       ciprofloxacin (CIPRO) 500 MG tablet Take 1 tablet (500 mg) by mouth 2 times daily for 7 days. 14 tablet 0    Continuous Glucose  (DEXCOM G7 ) KASSANDRA USE AS DIRECTED PER  INSTRUCTIONS      Continuous Glucose Sensor (DEXCOM G7 SENSOR) MISC USE AS DIRECTED PER  INSTRUCTIONS. REPLACE SENSOR EVERY 10 DAYS      HERBALS Take 1 each by mouth daily (Mushroom complex 6)      insulin lispro (HUMALOG VIAL) 100 UNIT/ML vial Inject subcutaneously 3 times daily (before meals). INJECT 60 UNITS UNDER THE SKIN AS DIRECTED. BOLUS UP TO 30 UNITS DAILY.  UNITS DAILY. USE WITH INSULIN PUMP      [facility]      Multiple Vitamins-Minerals (MULTIVITAMIN MEN) TABS Take 1 tablet by mouth daily      polyethylene glycol (MIRALAX) 17 GM/Dose powder Take 1 capful. by mouth daily as needed.      pravastatin (PRAVACHOL) 40 MG tablet Take 40 mg by mouth daily.      probiotic CAPS Take 1 capsule by mouth daily (Zenwise- pre-and pro-biotic)      senna-docusate (SENOKOT-S/PERICOLACE) 8.6-50 MG tablet Take 2 tablets by mouth 2 times daily as needed for constipation. 60 tablet 0    tamsulosin (FLOMAX) 0.4 MG capsule Take 1 capsule (0.4 mg) by mouth daily.       No current facility-administered medications for this visit.       ROS:  4 point ROS including Respiratory, CV, GI and , other than that noted in the HPI,  is negative    Vitals:  /72   Pulse 72   Temp 97.5  F (36.4  C)   Resp 16   Ht 1.854 m (6' 1\")   Wt 109.1 kg (240 lb 9.6 oz)   SpO2 95%   BMI 31.74 kg/m    Exam:  GENERAL APPEARANCE:  Alert, in no distress, morbidly obese  NECK:  No adenopathy,masses or thyromegaly  RESP:  respiratory effort and " palpation of chest normal, lungs clear to auscultation , no respiratory distress  CV:  regular rate and rhythm, no murmur, rub, or gallop, trace edema BLE  ABDOMEN:  normal bowel sounds, soft, nontender, no hepatosplenomegaly or other masses, reports constipation, no guarding or rebound, bowel sounds normal  :   No moore voiding ok no concern  M/S:   Using walker and wheelchair for transfer  SKIN:  wound healing well, no signs of infection site is open and healed no sign of infection  NEURO:   Examination of sensation by touch normal  PSYCH:  oriented X 3    Lab/Diagnostic data:  Most Recent 3 CBC's:  Recent Labs   Lab Test 11/15/24  0820 11/04/24  0637 10/11/24  0523   WBC 10.9 10.2 7.0   HGB 11.6* 11.2* 11.3*   MCV 93 94 95    454* 442     Most Recent 3 BMP's:  Recent Labs   Lab Test 11/15/24  0820 11/04/24  0637 09/20/24  0609    139 140   POTASSIUM 4.1 4.1 4.6   CHLORIDE 101 104 104   CO2 25 27 29   BUN 26.7* 23.1* 20.9   CR 1.28* 1.13 1.13   ANIONGAP 11 8 7   ETHEL 8.9 9.0 8.6*   * 67* 158*       ASSESSMENT/PLAN:  (N39.0) Urinary tract infection without hematuria, site unspecified  (R33.9) Urinary retention  Comment: Acute on chronic with 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  Positive UTI on 10/10/2024 treated with Macrobid.  Moore discontinued after 10/15/2024 urology follow-up. Recommend recheck of BMP when he is done with abx.  Plan:   -Continue Flomax  -Postvoid residual negative over the weekend   -Follow-up with urology on 12/11/24  -Ciprofloxacin 500 mg BID X7 days- to be completed 11/22/24  - BMP Due  after abx completion     (E10.69) Type 1 diabetes mellitus with other specified complication (H)  Comment: No episodes of of hyperglycemia reported BS controlled  Last A1C 6.1.BS ranging 150-180.  Per patient request A1C checked it was 5.6   Plan:   - Continue Continues insuline use via self managed pump  -Monitor for changes  -Hemoglobin A1c every 3 month              Electronically signed by: RUTH Nunez CNP

## 2024-11-21 ENCOUNTER — TRANSITIONAL CARE UNIT VISIT (OUTPATIENT)
Dept: GERIATRICS | Facility: CLINIC | Age: 74
End: 2024-11-21
Payer: COMMERCIAL

## 2024-11-21 VITALS
BODY MASS INDEX: 31.94 KG/M2 | RESPIRATION RATE: 17 BRPM | OXYGEN SATURATION: 95 % | HEIGHT: 73 IN | SYSTOLIC BLOOD PRESSURE: 107 MMHG | HEART RATE: 81 BPM | WEIGHT: 241 LBS | TEMPERATURE: 97.7 F | DIASTOLIC BLOOD PRESSURE: 68 MMHG

## 2024-11-25 ENCOUNTER — TRANSITIONAL CARE UNIT VISIT (OUTPATIENT)
Dept: GERIATRICS | Facility: CLINIC | Age: 74
End: 2024-11-25
Payer: COMMERCIAL

## 2024-11-26 VITALS
TEMPERATURE: 97.7 F | SYSTOLIC BLOOD PRESSURE: 130 MMHG | HEART RATE: 92 BPM | WEIGHT: 223.6 LBS | RESPIRATION RATE: 17 BRPM | OXYGEN SATURATION: 95 % | HEIGHT: 73 IN | BODY MASS INDEX: 29.63 KG/M2 | DIASTOLIC BLOOD PRESSURE: 75 MMHG

## 2025-06-19 ENCOUNTER — LAB REQUISITION (OUTPATIENT)
Dept: LAB | Facility: CLINIC | Age: 75
End: 2025-06-19
Payer: COMMERCIAL

## 2025-06-19 DIAGNOSIS — Z12.5 ENCOUNTER FOR SCREENING FOR MALIGNANT NEOPLASM OF PROSTATE: ICD-10-CM

## 2025-06-19 DIAGNOSIS — I87.323 CHRONIC VENOUS HYPERTENSION (IDIOPATHIC) WITH INFLAMMATION OF BILATERAL LOWER EXTREMITY: ICD-10-CM

## 2025-06-19 DIAGNOSIS — E10.42 TYPE 1 DIABETES MELLITUS WITH DIABETIC POLYNEUROPATHY (H): ICD-10-CM

## 2025-06-19 DIAGNOSIS — E78.2 MIXED HYPERLIPIDEMIA: ICD-10-CM

## 2025-06-19 LAB
ANION GAP SERPL CALCULATED.3IONS-SCNC: 8 MMOL/L (ref 7–15)
BUN SERPL-MCNC: 27.6 MG/DL (ref 8–23)
CALCIUM SERPL-MCNC: 9.6 MG/DL (ref 8.8–10.4)
CHLORIDE SERPL-SCNC: 107 MMOL/L (ref 98–107)
CHOLEST SERPL-MCNC: 128 MG/DL
CREAT SERPL-MCNC: 1.24 MG/DL (ref 0.67–1.17)
CREAT UR-MCNC: 50.5 MG/DL
EGFRCR SERPLBLD CKD-EPI 2021: 61 ML/MIN/1.73M2
FASTING STATUS PATIENT QL REPORTED: ABNORMAL
FASTING STATUS PATIENT QL REPORTED: NORMAL
GLUCOSE SERPL-MCNC: 149 MG/DL (ref 70–99)
HCO3 SERPL-SCNC: 27 MMOL/L (ref 22–29)
HDLC SERPL-MCNC: 50 MG/DL
LDLC SERPL CALC-MCNC: 69 MG/DL
MICROALBUMIN UR-MCNC: 24.3 MG/L
MICROALBUMIN/CREAT UR: 48.12 MG/G CR (ref 0–17)
NONHDLC SERPL-MCNC: 78 MG/DL
POTASSIUM SERPL-SCNC: 4.3 MMOL/L (ref 3.4–5.3)
PSA SERPL DL<=0.01 NG/ML-MCNC: 0.97 NG/ML (ref 0–6.5)
SODIUM SERPL-SCNC: 142 MMOL/L (ref 135–145)
TRIGL SERPL-MCNC: 46 MG/DL

## 2025-06-19 PROCEDURE — 80061 LIPID PANEL: CPT | Mod: ORL | Performed by: FAMILY MEDICINE

## 2025-06-19 PROCEDURE — G0103 PSA SCREENING: HCPCS | Mod: ORL | Performed by: FAMILY MEDICINE

## 2025-06-19 PROCEDURE — 80048 BASIC METABOLIC PNL TOTAL CA: CPT | Mod: ORL | Performed by: FAMILY MEDICINE

## 2025-06-19 PROCEDURE — 82043 UR ALBUMIN QUANTITATIVE: CPT | Mod: ORL | Performed by: FAMILY MEDICINE

## (undated) DEVICE — DRAPE C-ARM 60X42" 1013

## (undated) DEVICE — WIRE GUIDE 3.2X400MM  357.399

## (undated) DEVICE — SUTURE VICRYL+ 2-0 CT-2 27" UND VCP269H

## (undated) DEVICE — DRSG ISLAND 4X4" SQUARE LF MSC3244

## (undated) DEVICE — SUTURE VICRYL+ 0 27IN CT-1 UND VCP260H

## (undated) DEVICE — SU ETHILON 3-0 PS-1 18" 1663G

## (undated) DEVICE — DRAPE IOBAN ISOLATION VERTICAL 320X21CM 6617

## (undated) DEVICE — SOL WATER IRRIG 1000ML BOTTLE 2F7114

## (undated) DEVICE — DRAPE STERI U 1015

## (undated) DEVICE — DRSG MEPILEX BORDER ADHS 10CMX20CM STRL 496405

## (undated) DEVICE — SUCTION MANIFOLD NEPTUNE 2 SYS 1 PORT 702-025-000

## (undated) DEVICE — GLOVE BIOGEL INDICATOR 7.5 LF 41675

## (undated) DEVICE — SUTURE MONOCRYL 3-0 18 PS2 UND MCP497G

## (undated) DEVICE — DRAPE C-ARMOR 5 SIDED 5523

## (undated) DEVICE — ESU GROUND PAD ADULT REM W/15' CORD E7507DB

## (undated) DEVICE — DRILL BIT CANNULATED 16MM HOLLOW STER 03.037.004S

## (undated) DEVICE — Device

## (undated) DEVICE — PREP CHLORAPREP 26ML TINTED HI-LITE ORANGE 930815

## (undated) DEVICE — ROD RM 950MM 3MM 3.8MM BALL TIP STRL

## (undated) DEVICE — SOL NACL 0.9% IRRIG 1000ML BOTTLE 2F7124

## (undated) DEVICE — DRSG TEGADERM 4X4 3/4" 1626W

## (undated) DEVICE — SU DERMABOND ADVANCED .7ML DNX12

## (undated) DEVICE — GLOVE BIOGEL PI INDICATOR 8.0 LF 41680

## (undated) DEVICE — DRSG GAUZE 2X2" 8042

## (undated) DEVICE — SOL ISOPROPYL RUBBING ALCOHOL USP 70% 4OZ HDX-20 I0020

## (undated) DEVICE — PACK MINOR SINGLE BASIN SSK3001

## (undated) DEVICE — DRILL BIT QUICK COUPLING 3 FLUTE 4.2MMX330/100MM CALIBRATE

## (undated) DEVICE — KIT PATIENT CARE HANA TABLE PROFX SUPINE 6855

## (undated) DEVICE — GLOVE BIOGEL PI ULTRATOUCH G SZ 7.5 42175

## (undated) DEVICE — DRSG KERLIX FLUFFS X5

## (undated) DEVICE — CUSTOM PACK GEN MAJOR SBA5BGMHEA

## (undated) DEVICE — MAT FLOOR SURGICAL 40X38 0702140238

## (undated) DEVICE — PACKING IODOFORM STRIP 1/4" 7831

## (undated) DEVICE — PADDING CAST 4IN WEBRIL STRL 2502

## (undated) RX ORDER — ONDANSETRON 2 MG/ML
INJECTION INTRAMUSCULAR; INTRAVENOUS
Status: DISPENSED
Start: 2024-09-14

## (undated) RX ORDER — PROPOFOL 10 MG/ML
INJECTION, EMULSION INTRAVENOUS
Status: DISPENSED
Start: 2024-09-14

## (undated) RX ORDER — FENTANYL CITRATE-0.9 % NACL/PF 10 MCG/ML
PLASTIC BAG, INJECTION (ML) INTRAVENOUS
Status: DISPENSED
Start: 2024-09-14

## (undated) RX ORDER — GLYCOPYRROLATE 0.2 MG/ML
INJECTION, SOLUTION INTRAMUSCULAR; INTRAVENOUS
Status: DISPENSED
Start: 2024-09-14

## (undated) RX ORDER — DEXAMETHASONE SODIUM PHOSPHATE 10 MG/ML
INJECTION, SOLUTION INTRAMUSCULAR; INTRAVENOUS
Status: DISPENSED
Start: 2024-09-14

## (undated) RX ORDER — LIDOCAINE HYDROCHLORIDE 10 MG/ML
INJECTION, SOLUTION EPIDURAL; INFILTRATION; INTRACAUDAL; PERINEURAL
Status: DISPENSED
Start: 2024-09-14